# Patient Record
Sex: MALE | Race: WHITE | Employment: OTHER | URBAN - METROPOLITAN AREA
[De-identification: names, ages, dates, MRNs, and addresses within clinical notes are randomized per-mention and may not be internally consistent; named-entity substitution may affect disease eponyms.]

---

## 2019-12-04 ENCOUNTER — HOSPITAL ENCOUNTER (INPATIENT)
Age: 69
LOS: 7 days | Discharge: NURSING, REHAB OR LONG-TERM CARE | DRG: 603 | End: 2019-12-11
Attending: STUDENT IN AN ORGANIZED HEALTH CARE EDUCATION/TRAINING PROGRAM | Admitting: HOSPITALIST
Payer: MEDICARE

## 2019-12-04 ENCOUNTER — APPOINTMENT (OUTPATIENT)
Dept: VASCULAR SURGERY | Age: 69
DRG: 603 | End: 2019-12-04
Attending: HOSPITALIST
Payer: MEDICARE

## 2019-12-04 ENCOUNTER — APPOINTMENT (OUTPATIENT)
Dept: GENERAL RADIOLOGY | Age: 69
DRG: 603 | End: 2019-12-04
Attending: HOSPITALIST
Payer: MEDICARE

## 2019-12-04 DIAGNOSIS — L03.116 CELLULITIS OF LEFT LOWER EXTREMITY: Primary | ICD-10-CM

## 2019-12-04 PROBLEM — L03.119 CELLULITIS OF LOWER EXTREMITY: Status: ACTIVE | Noted: 2019-12-04

## 2019-12-04 LAB
ALBUMIN SERPL-MCNC: 3.3 G/DL (ref 3.5–5)
ALBUMIN/GLOB SERPL: 0.8 {RATIO} (ref 1.1–2.2)
ALP SERPL-CCNC: 106 U/L (ref 45–117)
ALT SERPL-CCNC: 17 U/L (ref 12–78)
ANION GAP SERPL CALC-SCNC: 9 MMOL/L (ref 5–15)
AST SERPL-CCNC: 9 U/L (ref 15–37)
BASOPHILS # BLD: 0.1 K/UL (ref 0–0.1)
BASOPHILS NFR BLD: 0 % (ref 0–1)
BILIRUB SERPL-MCNC: 0.4 MG/DL (ref 0.2–1)
BNP SERPL-MCNC: 250 PG/ML
BUN SERPL-MCNC: 29 MG/DL (ref 6–20)
BUN/CREAT SERPL: 22 (ref 12–20)
CALCIUM SERPL-MCNC: 9.2 MG/DL (ref 8.5–10.1)
CHLORIDE SERPL-SCNC: 95 MMOL/L (ref 97–108)
CO2 SERPL-SCNC: 30 MMOL/L (ref 21–32)
CREAT SERPL-MCNC: 1.34 MG/DL (ref 0.7–1.3)
DIFFERENTIAL METHOD BLD: ABNORMAL
EOSINOPHIL # BLD: 0.3 K/UL (ref 0–0.4)
EOSINOPHIL NFR BLD: 2 % (ref 0–7)
ERYTHROCYTE [DISTWIDTH] IN BLOOD BY AUTOMATED COUNT: 13.2 % (ref 11.5–14.5)
EST. AVERAGE GLUCOSE BLD GHB EST-MCNC: 192 MG/DL
GLOBULIN SER CALC-MCNC: 4.2 G/DL (ref 2–4)
GLUCOSE BLD STRIP.AUTO-MCNC: 138 MG/DL (ref 65–100)
GLUCOSE SERPL-MCNC: 254 MG/DL (ref 65–100)
HBA1C MFR BLD: 8.3 % (ref 4–5.6)
HCT VFR BLD AUTO: 38.3 % (ref 36.6–50.3)
HGB BLD-MCNC: 11.8 G/DL (ref 12.1–17)
IMM GRANULOCYTES # BLD AUTO: 0.1 K/UL (ref 0–0.04)
IMM GRANULOCYTES NFR BLD AUTO: 1 % (ref 0–0.5)
LYMPHOCYTES # BLD: 1.5 K/UL (ref 0.8–3.5)
LYMPHOCYTES NFR BLD: 10 % (ref 12–49)
MAGNESIUM SERPL-MCNC: 1.4 MG/DL (ref 1.6–2.4)
MCH RBC QN AUTO: 30.2 PG (ref 26–34)
MCHC RBC AUTO-ENTMCNC: 30.8 G/DL (ref 30–36.5)
MCV RBC AUTO: 98 FL (ref 80–99)
MONOCYTES # BLD: 0.7 K/UL (ref 0–1)
MONOCYTES NFR BLD: 5 % (ref 5–13)
NEUTS SEG # BLD: 11.6 K/UL (ref 1.8–8)
NEUTS SEG NFR BLD: 82 % (ref 32–75)
NRBC # BLD: 0 K/UL (ref 0–0.01)
NRBC BLD-RTO: 0 PER 100 WBC
PLATELET # BLD AUTO: 332 K/UL (ref 150–400)
PMV BLD AUTO: 8.4 FL (ref 8.9–12.9)
POTASSIUM SERPL-SCNC: 4.4 MMOL/L (ref 3.5–5.1)
PROCALCITONIN SERPL-MCNC: <0.05 NG/ML
PROT SERPL-MCNC: 7.5 G/DL (ref 6.4–8.2)
RBC # BLD AUTO: 3.91 M/UL (ref 4.1–5.7)
SERVICE CMNT-IMP: ABNORMAL
SODIUM SERPL-SCNC: 134 MMOL/L (ref 136–145)
WBC # BLD AUTO: 14.2 K/UL (ref 4.1–11.1)

## 2019-12-04 PROCEDURE — 74011250636 HC RX REV CODE- 250/636: Performed by: HOSPITALIST

## 2019-12-04 PROCEDURE — 74011000258 HC RX REV CODE- 258: Performed by: STUDENT IN AN ORGANIZED HEALTH CARE EDUCATION/TRAINING PROGRAM

## 2019-12-04 PROCEDURE — 85025 COMPLETE CBC W/AUTO DIFF WBC: CPT

## 2019-12-04 PROCEDURE — 80053 COMPREHEN METABOLIC PANEL: CPT

## 2019-12-04 PROCEDURE — 83036 HEMOGLOBIN GLYCOSYLATED A1C: CPT

## 2019-12-04 PROCEDURE — 83880 ASSAY OF NATRIURETIC PEPTIDE: CPT

## 2019-12-04 PROCEDURE — 74011636637 HC RX REV CODE- 636/637: Performed by: HOSPITALIST

## 2019-12-04 PROCEDURE — 82962 GLUCOSE BLOOD TEST: CPT

## 2019-12-04 PROCEDURE — 99285 EMERGENCY DEPT VISIT HI MDM: CPT

## 2019-12-04 PROCEDURE — 74011250636 HC RX REV CODE- 250/636: Performed by: STUDENT IN AN ORGANIZED HEALTH CARE EDUCATION/TRAINING PROGRAM

## 2019-12-04 PROCEDURE — 93970 EXTREMITY STUDY: CPT

## 2019-12-04 PROCEDURE — 84145 PROCALCITONIN (PCT): CPT

## 2019-12-04 PROCEDURE — 65270000029 HC RM PRIVATE

## 2019-12-04 PROCEDURE — 74011000258 HC RX REV CODE- 258: Performed by: HOSPITALIST

## 2019-12-04 PROCEDURE — 83735 ASSAY OF MAGNESIUM: CPT

## 2019-12-04 PROCEDURE — 74011250637 HC RX REV CODE- 250/637: Performed by: HOSPITALIST

## 2019-12-04 PROCEDURE — 36415 COLL VENOUS BLD VENIPUNCTURE: CPT

## 2019-12-04 PROCEDURE — 74011000250 HC RX REV CODE- 250: Performed by: HOSPITALIST

## 2019-12-04 PROCEDURE — 87040 BLOOD CULTURE FOR BACTERIA: CPT

## 2019-12-04 PROCEDURE — 71045 X-RAY EXAM CHEST 1 VIEW: CPT

## 2019-12-04 RX ORDER — LANOLIN ALCOHOL/MO/W.PET/CERES
3 CREAM (GRAM) TOPICAL
Status: DISCONTINUED | OUTPATIENT
Start: 2019-12-04 | End: 2019-12-11 | Stop reason: HOSPADM

## 2019-12-04 RX ORDER — LISINOPRIL 20 MG/1
20 TABLET ORAL DAILY
COMMUNITY

## 2019-12-04 RX ORDER — MAGNESIUM SULFATE HEPTAHYDRATE 40 MG/ML
2 INJECTION, SOLUTION INTRAVENOUS ONCE
Status: COMPLETED | OUTPATIENT
Start: 2019-12-04 | End: 2019-12-04

## 2019-12-04 RX ORDER — INSULIN LISPRO 100 [IU]/ML
INJECTION, SOLUTION INTRAVENOUS; SUBCUTANEOUS
Status: DISCONTINUED | OUTPATIENT
Start: 2019-12-04 | End: 2019-12-11 | Stop reason: HOSPADM

## 2019-12-04 RX ORDER — CLOTRIMAZOLE AND BETAMETHASONE DIPROPIONATE 10; .64 MG/G; MG/G
CREAM TOPICAL 2 TIMES DAILY
Status: DISCONTINUED | OUTPATIENT
Start: 2019-12-04 | End: 2019-12-11 | Stop reason: HOSPADM

## 2019-12-04 RX ORDER — MAGNESIUM SULFATE 100 %
4 CRYSTALS MISCELLANEOUS AS NEEDED
Status: DISCONTINUED | OUTPATIENT
Start: 2019-12-04 | End: 2019-12-11 | Stop reason: HOSPADM

## 2019-12-04 RX ORDER — HEPARIN SODIUM 5000 [USP'U]/ML
5000 INJECTION, SOLUTION INTRAVENOUS; SUBCUTANEOUS EVERY 8 HOURS
Status: DISCONTINUED | OUTPATIENT
Start: 2019-12-04 | End: 2019-12-11 | Stop reason: HOSPADM

## 2019-12-04 RX ORDER — LOPERAMIDE HYDROCHLORIDE 2 MG/1
2 CAPSULE ORAL
Status: DISCONTINUED | OUTPATIENT
Start: 2019-12-04 | End: 2019-12-11 | Stop reason: HOSPADM

## 2019-12-04 RX ORDER — INSULIN GLARGINE 100 [IU]/ML
20 INJECTION, SOLUTION SUBCUTANEOUS
Status: DISCONTINUED | OUTPATIENT
Start: 2019-12-04 | End: 2019-12-11 | Stop reason: HOSPADM

## 2019-12-04 RX ORDER — BUMETANIDE 0.25 MG/ML
1 INJECTION INTRAMUSCULAR; INTRAVENOUS EVERY 12 HOURS
Status: DISCONTINUED | OUTPATIENT
Start: 2019-12-04 | End: 2019-12-07

## 2019-12-04 RX ORDER — DOCUSATE SODIUM 100 MG/1
100 CAPSULE, LIQUID FILLED ORAL DAILY
Status: DISCONTINUED | OUTPATIENT
Start: 2019-12-05 | End: 2019-12-11 | Stop reason: HOSPADM

## 2019-12-04 RX ORDER — SERTRALINE HYDROCHLORIDE 50 MG/1
25 TABLET, FILM COATED ORAL
Status: DISCONTINUED | OUTPATIENT
Start: 2019-12-04 | End: 2019-12-11 | Stop reason: HOSPADM

## 2019-12-04 RX ORDER — VANCOMYCIN 2 GRAM/500 ML IN 0.9 % SODIUM CHLORIDE INTRAVENOUS
2000 ONCE
Status: COMPLETED | OUTPATIENT
Start: 2019-12-04 | End: 2019-12-04

## 2019-12-04 RX ORDER — METFORMIN HYDROCHLORIDE 1000 MG/1
1000 TABLET ORAL 2 TIMES DAILY WITH MEALS
COMMUNITY

## 2019-12-04 RX ORDER — METOPROLOL SUCCINATE 100 MG/1
100 TABLET, EXTENDED RELEASE ORAL DAILY
COMMUNITY

## 2019-12-04 RX ORDER — LANOLIN ALCOHOL/MO/W.PET/CERES
400 CREAM (GRAM) TOPICAL DAILY
Status: DISCONTINUED | OUTPATIENT
Start: 2019-12-05 | End: 2019-12-11 | Stop reason: HOSPADM

## 2019-12-04 RX ORDER — SODIUM CHLORIDE 9 MG/ML
100 INJECTION, SOLUTION INTRAVENOUS CONTINUOUS
Status: CANCELLED | OUTPATIENT
Start: 2019-12-04

## 2019-12-04 RX ORDER — INSULIN ASPART 100 [IU]/ML
INJECTION, SOLUTION INTRAVENOUS; SUBCUTANEOUS
COMMUNITY

## 2019-12-04 RX ORDER — CLOTRIMAZOLE AND BETAMETHASONE DIPROPIONATE 10; .64 MG/G; MG/G
CREAM TOPICAL 2 TIMES DAILY
COMMUNITY
End: 2020-01-06

## 2019-12-04 RX ORDER — SODIUM CHLORIDE 0.9 % (FLUSH) 0.9 %
5-40 SYRINGE (ML) INJECTION EVERY 8 HOURS
Status: DISCONTINUED | OUTPATIENT
Start: 2019-12-04 | End: 2019-12-11 | Stop reason: HOSPADM

## 2019-12-04 RX ORDER — LOPERAMIDE HYDROCHLORIDE 2 MG/1
2 CAPSULE ORAL
COMMUNITY
End: 2020-01-06

## 2019-12-04 RX ORDER — SODIUM CHLORIDE 0.9 % (FLUSH) 0.9 %
5-40 SYRINGE (ML) INJECTION AS NEEDED
Status: DISCONTINUED | OUTPATIENT
Start: 2019-12-04 | End: 2019-12-11 | Stop reason: HOSPADM

## 2019-12-04 RX ORDER — MELATONIN 5 MG
5 CAPSULE ORAL
COMMUNITY

## 2019-12-04 RX ORDER — INSULIN GLARGINE 100 [IU]/ML
10 INJECTION, SOLUTION SUBCUTANEOUS EVERY EVENING
COMMUNITY
End: 2020-01-13

## 2019-12-04 RX ORDER — DOCUSATE SODIUM 100 MG/1
100 CAPSULE, LIQUID FILLED ORAL DAILY
COMMUNITY

## 2019-12-04 RX ORDER — LANOLIN ALCOHOL/MO/W.PET/CERES
400 CREAM (GRAM) TOPICAL DAILY
COMMUNITY

## 2019-12-04 RX ORDER — VANCOMYCIN/0.9 % SOD CHLORIDE 1.5G/250ML
1500 PLASTIC BAG, INJECTION (ML) INTRAVENOUS
Status: DISCONTINUED | OUTPATIENT
Start: 2019-12-05 | End: 2019-12-05

## 2019-12-04 RX ORDER — GLIPIZIDE 5 MG/1
5 TABLET ORAL 2 TIMES DAILY
Status: ON HOLD | COMMUNITY
End: 2019-12-11 | Stop reason: SDUPTHER

## 2019-12-04 RX ORDER — FUROSEMIDE 40 MG/1
40 TABLET ORAL 2 TIMES DAILY
COMMUNITY
End: 2019-12-11

## 2019-12-04 RX ORDER — SERTRALINE HYDROCHLORIDE 25 MG/1
25 TABLET, FILM COATED ORAL
COMMUNITY

## 2019-12-04 RX ADMIN — AMPICILLIN SODIUM AND SULBACTAM SODIUM 3 G: 2; 1 INJECTION, POWDER, FOR SOLUTION INTRAMUSCULAR; INTRAVENOUS at 15:38

## 2019-12-04 RX ADMIN — MAGNESIUM SULFATE HEPTAHYDRATE 2 G: 40 INJECTION, SOLUTION INTRAVENOUS at 19:05

## 2019-12-04 RX ADMIN — VANCOMYCIN HYDROCHLORIDE 2000 MG: 10 INJECTION, POWDER, LYOPHILIZED, FOR SOLUTION INTRAVENOUS at 16:31

## 2019-12-04 RX ADMIN — Medication 10 ML: at 22:56

## 2019-12-04 RX ADMIN — INSULIN GLARGINE 20 UNITS: 100 INJECTION, SOLUTION SUBCUTANEOUS at 22:57

## 2019-12-04 RX ADMIN — MELATONIN 3 MG: at 22:56

## 2019-12-04 RX ADMIN — HEPARIN SODIUM 5000 UNITS: 5000 INJECTION INTRAVENOUS; SUBCUTANEOUS at 19:21

## 2019-12-04 RX ADMIN — BUMETANIDE 1 MG: 0.25 INJECTION INTRAMUSCULAR; INTRAVENOUS at 22:57

## 2019-12-04 RX ADMIN — PIPERACILLIN AND TAZOBACTAM 3.38 G: 3; .375 INJECTION, POWDER, LYOPHILIZED, FOR SOLUTION INTRAVENOUS at 19:05

## 2019-12-04 RX ADMIN — SERTRALINE HYDROCHLORIDE 25 MG: 50 TABLET ORAL at 22:56

## 2019-12-04 NOTE — ED TRIAGE NOTES
Arrives via EMS from [facility] for cellulitis of bilateral lower legs x 2 months that has progressed r/t frequent urinary incontinence down his legs. Home health aide requested transport for evaluation. Hx diabetes, UTI, sepsis, hypokalemia and hypomagnesia.  EXTREMELY [place]

## 2019-12-04 NOTE — H&P
[facility][note 3]  HISTORY AND PHYSICAL    Name:  Malissa Cheng  MR#:  [id number]  :  1950  ACCOUNT #:  [de-identified]  ADMIT DATE:  2019    PRIMARY CARE PROVIDER:  Unknown. SOURCE OF INFORMATION:  The patient. CHIEF COMPLAINT:  Lower extremity swelling and redness of 2 months' duration. HISTORY OF PRESENTING ILLNESS:  This is a 78-year-old  male with past medical history significant for type 2 diabetes mellitus and hypertension who is from [facility] facility who is brought for progressive lower extremity swelling and redness of 2 months' duration. The patient is hard of hearing and poor historian. He uses hearing aid. No medical record from the [facility] facility. The patient stated that he started to have lower extremity swelling, redness and progressively worse for the last 2 months. He has also associated pain. He says he uses wheelchair and a walker to ambulate. No fever, chills, sweating, nausea, vomiting, left-sided chest pain, palpitation, shortness of breath, abdominal pain, urine complaint or abnormal bowel movement. The patient not able to give detailed information about his previous medical problems. In ER, his blood pressure was 137/67, pulse 75, temperature 97.4, saturation of oxygen 94% on room air. He received IV ampicillin, vancomycin, blood culture was sent, and referred to Hospitalist Service for further evaluation and admission. REVIEW OF SYSTEMS:  Pertinent positive findings mentioned in HPI. All systems reviewed. No any other positive finding. PAST MEDICAL HISTORY:  1. Type 2 diabetes mellitus. 2.  Hypertension. 3.  Hard of hearing. ALLERGIES:  NO KNOWN DRUG ALLERGIES. SOCIAL HISTORY:  The patient lives at [facility] facility. No tobacco or alcohol abuse. Ambulates with wheelchair and a walker. Code status, full code.     FAMILY HISTORY:  Unable to obtain from the patient. PHYSICAL EXAMINATION:  VITAL SIGNS:  Blood pressure 137/67, pulse 75, temperature 97.4, respiratory rate 16, saturation of oxygen 94% on room air. GENERAL APPEARANCE:  The patient is alert, cooperative, not in distress. He appears his stated age. The patient is hard of hearing. He uses hearing aid. HEENT:  Pink conjunctivae. Anicteric sclerae. Moist tongue and buccal mucosa. LUNGS:  Decreased breath sounds to auscultation bilaterally. CHEST WALL:  No tenderness or deformity. CARDIOVASCULAR SYSTEM:  Regular rate and rhythm. S1 and S2 heard. No murmur or gallop. ABDOMEN:  Obese, soft, nontender. Bowel sounds normal.  No mass or organomegaly. EXTREMITIES:  Bilateral pretibial and pedal edema. SKIN:  He has diffuse redness involving both lower extremities, warm to touch and tender, and has area oozing extracellular fluids. CENTRAL NERVOUS SYSTEM:  Conscious and alert. Oriented to place and person. Motor 5/5. Cranial nerves II through XII grossly intact. LABORATORY DATA:  White blood cell count 14.2, hemoglobin 11.8, hematocrit 38.3, platelet count 321. Chemistry; sodium 134, potassium 4.4, chloride 95, CO2 of 30, anion gap 9, glucose 254, BUN 29, creatinine 1.34, BUN-creatinine ratio 22, calcium 9.2, magnesium 1.4. Total bilirubin 0.4, total protein 7.5, albumin 3.3, ALT 17, AST 9, alkaline phosphatase 106. Procalcitonin less than 0.05. ProBNP 250. ASSESSMENT:  1. Cellulitis of bilateral lower extremities, possible chronic venous stasis. 2.  Type 2 diabetes mellitus with hyperglycemia. 3.  Mild hyponatremia. 4.  Hypomagnesemia. 5.  Renal insufficiency. 6.  Chronic bilateral lower extremity edema. 7.  Hard of hearing. 8.  Morbid obesity. PLAN:  1. Cellulitis of the bilateral lower extremities with possible associated venous stasis. Admit the patient to medical floor. will continue IV Zosyn, vancomycin.    will follow up blood culture and will involve wound care nurse.  The patient is advised to elevate the leg.  will do a Doppler study of the lower extremities, echocardiography. 2.  Type 2 diabetes mellitus with hyperglycemia.  will check his hemoglobin A1c. No record of his home medication. will give him Lantus 20 units subcutaneous q. 12 sliding scale, monitor fingerstick glucose, and diabetes diet. 3.  Mild hyponatremia, multifactorial.  will repeat the BMP in a.m.  4.  Hypomagnesemia, replace with IV 2 g magnesium and repeat magnesium level in a.m.  5.  Renal insufficiency, multifactorial. will do a renal function test and if it does not improve, we may do ultrasound of the kidney. 6.  Bilateral lower extremity edema, unclear etiology. will do Doppler study of lower extremities, echocardiography, BNP, and we will give Bumex 1 mg IV q. 12 and monitor electrolytes. 7.  Impaired hearing. The patient uses hearing aid. Continue supportive care. 8.  Morbid obesity. Diet and weight loss program.    DVT prophylaxis, heparin. FUNCTIONAL STATUS PRIOR TO ADMISSION:  The patient uses wheelchair and walker.         Amadeo Ching MD      [id number]  D:  12/04/2019 14:49  T:  12/04/2019 17:46  JOB #:  [id number]

## 2019-12-04 NOTE — PROGRESS NOTES
Pharmacist Note - Vancomycin Dosing    Consult provided for this 71 y.o. male for indication of LE cellulitis. Antibiotic regimen(s): Vanc + Zosyn  Patient on vancomycin PTA? NO     Recent Labs     19  1127   WBC 14.2*   CREA 1.34*   BUN 29*     Frequency of BMP: daily  Height: 180 cm  Weight: 108 kg  Est CrCl: 65 ml/min  Temp (24hrs), Av.4 °F (36.3 °C), Min:97.4 °F (36.3 °C), Max:97.4 °F (36.3 °C)    Goal trough = 10 - 15 mcg/mL    Therapy will be initiated with a loading dose of 2000 mg IV x 1 to be followed by a maintenance dose of 1500 mg IV every 18 hours. Pharmacy to follow patient daily and order levels / make dose adjustments as appropriate.

## 2019-12-04 NOTE — PROGRESS NOTES
Admission Medication Reconciliation:    Information obtained from:  [facility]  RxQuery data available¹:  NO    Comments/Recommendations: Updated PTA meds/reviewed patient's allergies. 1)  Information obtained from Medication Review Report from [facility]. Attempted to reach RN at the facility but no one is picking up the phone at the facility at this time. 2)  There were no medications listed on the PTA list prior to this review. All medications from Facility list have been added to the list.     [facility] benefit data reflects medications filled and processed through the patient's insurance, however   this data does NOT capture whether the medication was picked up or is currently being taken by the patient. Allergies:  Patient has no known allergies. Significant PMH/Disease States: No past medical history on file. Chief Complaint for this Admission:    Chief Complaint   Patient presents with    Skin Problem     Prior to Admission Medications:   Prior to Admission Medications   Prescriptions Last Dose Informant Patient Reported? Taking? clotrimazole-betamethasone (LOTRISONE) topical cream   Yes Yes   Sig: Apply  to affected area two (2) times a day. Apply to groin/scrotum   docusate sodium (COLACE) 100 mg capsule   Yes Yes   Sig: Take 100 mg by mouth daily. furosemide (LASIX) 40 mg tablet   Yes Yes   Sig: Take 40 mg by mouth two (2) times a day. glipiZIDE (GLUCOTROL) 5 mg tablet   Yes Yes   Sig: Take 5 mg by mouth two (2) times a day. insulin aspart U-100 (NOVOLOG U-100 INSULIN ASPART) 100 unit/mL injection   Yes Yes   Sig: by SubCUTAneous route. Sliding scale:  -250 = 4 units  -300 = 6 units  -350 = 8 units  BS > 351 = 10 units   insulin glargine (LANTUS,BASAGLAR) 100 unit/mL (3 mL) inpn   Yes Yes   Sig: 10 Units by SubCUTAneous route every evening. lisinopril (PRINIVIL, ZESTRIL) 20 mg tablet   Yes Yes   Sig: Take 20 mg by mouth daily.    loperamide (IMODIUM) 2 mg capsule   Yes Yes   Sig: Take 2 mg by mouth every six (6) hours as needed for Diarrhea.   magnesium oxide (MAG-OX) 400 mg tablet   Yes Yes   Sig: Take 400 mg by mouth daily. melatonin 5 mg cap capsule   Yes Yes   Sig: Take 5 mg by mouth nightly. metFORMIN (GLUCOPHAGE) 1,000 mg tablet   Yes Yes   Sig: Take 1,000 mg by mouth two (2) times daily (with meals). metoprolol succinate (TOPROL-XL) 100 mg tablet   Yes Yes   Sig: Take 100 mg by mouth daily. sertraline (ZOLOFT) 25 mg tablet   Yes Yes   Sig: Take 25 mg by mouth nightly. Facility-Administered Medications: None       Please contact the main inpatient pharmacy with any questions or concerns at [phone] and we will direct you to the clinical pharmacist covering this patient's care while in-house.    Nayely Beck, NAYLAD

## 2019-12-04 NOTE — PROGRESS NOTES
Admission Medication Reconciliation: 
 
Information obtained from:  [facility] RxQuery data available¹:  NO 
 
Comments/Recommendations: Updated PTA meds/reviewed patient's allergies. 1)  Information obtained from Medication Review Report from [facility]. Attempted to reach RN at the facility but no one is picking up the phone at the facility at this time. 2)  There were no medications listed on the PTA list prior to this review. All medications from Facility list have been added to the list. 
  
[facility] benefit data reflects medications filled and processed through the patient's insurance, however  
this data does NOT capture whether the medication was picked up or is currently being taken by the patient. Allergies:  Patient has no known allergies. Significant PMH/Disease States: No past medical history on file. Chief Complaint for this Admission: Chief Complaint Patient presents with Skin Problem Prior to Admission Medications:  
Prior to Admission Medications Prescriptions Last Dose Informant Patient Reported? Taking? clotrimazole-betamethasone (LOTRISONE) topical cream   Yes Yes Sig: Apply  to affected area two (2) times a day. Apply to groin/scrotum  
docusate sodium (COLACE) 100 mg capsule   Yes Yes Sig: Take 100 mg by mouth daily. furosemide (LASIX) 40 mg tablet   Yes Yes Sig: Take 40 mg by mouth two (2) times a day. glipiZIDE (GLUCOTROL) 5 mg tablet   Yes Yes Sig: Take 5 mg by mouth two (2) times a day. insulin aspart U-100 (NOVOLOG U-100 INSULIN ASPART) 100 unit/mL injection   Yes Yes Sig: by SubCUTAneous route. Sliding scale: 
-250 = 4 units -300 = 6 units -350 = 8 units BS > 351 = 10 units  
insulin glargine (LANTUS,BASAGLAR) 100 unit/mL (3 mL) inpn   Yes Yes Sig: 10 Units by SubCUTAneous route every evening. lisinopril (PRINIVIL, ZESTRIL) 20 mg tablet   Yes Yes Sig: Take 20 mg by mouth daily. loperamide (IMODIUM) 2 mg capsule   Yes Yes Sig: Take 2 mg by mouth every six (6) hours as needed for Diarrhea.  
magnesium oxide (MAG-OX) 400 mg tablet   Yes Yes Sig: Take 400 mg by mouth daily. melatonin 5 mg cap capsule   Yes Yes Sig: Take 5 mg by mouth nightly. metFORMIN (GLUCOPHAGE) 1,000 mg tablet   Yes Yes Sig: Take 1,000 mg by mouth two (2) times daily (with meals). metoprolol succinate (TOPROL-XL) 100 mg tablet   Yes Yes Sig: Take 100 mg by mouth daily. sertraline (ZOLOFT) 25 mg tablet   Yes Yes Sig: Take 25 mg by mouth nightly. Facility-Administered Medications: None Please contact the main inpatient pharmacy with any questions or concerns at [phone] and we will direct you to the clinical pharmacist covering this patient's care while in-house.   
Ilana Lehman, PHARMD

## 2019-12-04 NOTE — ED PROVIDER NOTES
71 y.o. male with past medical history significant for DM, hypokalemia, UTI, sepsis, and hypomagnesia who presents via EMS from [address] [place] [address] with chief complaint of skin problem. Pt has possible cellulitis to bilateral lower legs x 2 months that has worsened secondary to frequent urinary incontinence that runs down his legs. Per EMS he has been wearing depends and has limited mobility, limiting his ability to get himself to the bathroom. Staff at his home checked on him this AM and were concerned so they called EMS to bring him to ED. Per staff he is not on any abx. Pt very hard of hearing. There are no other acute medical concerns at this time. Social hx: not available    PCP: No primary care provider on file. Note written by Octavia Pulido, as dictated by Desiree Edmonds MD 11:22 AM      The history is provided by the patient and the EMS personnel. No  was used. No past medical history on file. No past surgical history on file. No family history on file.     Social History     Socioeconomic History    Marital status: Not on file     Spouse name: Not on file    Number of children: Not on file    Years of education: Not on file    Highest education level: Not on file   Occupational History    Not on file   Social Needs    Financial resource strain: Not on file    Food insecurity:     Worry: Not on file     Inability: Not on file    Transportation needs:     Medical: Not on file     Non-medical: Not on file   Tobacco Use    Smoking status: Not on file   Substance and Sexual Activity    Alcohol use: Not on file    Drug use: Not on file    Sexual activity: Not on file   Lifestyle    Physical activity:     Days per week: Not on file     Minutes per session: Not on file    Stress: Not on file   Relationships    Social connections:     Talks on phone: Not on file     Gets together: Not on file     Attends Tenriism service: Not on file Active member of club or organization: Not on file     Attends meetings of clubs or organizations: Not on file     Relationship status: Not on file    Intimate partner violence:     Fear of current or ex partner: Not on file     Emotionally abused: Not on file     Physically abused: Not on file     Forced sexual activity: Not on file   Other Topics Concern    Not on file   Social History Narrative    Not on file         ALLERGIES: Patient has no allergy information on record. Review of Systems   Constitutional: Negative for fever. Genitourinary:        + urinary incontinence    Skin: Positive for rash (cellulitis, bilateral lower legs). All other systems reviewed and are negative. Vitals:    12/04/19 1121   BP: 137/67   Pulse: 75   Resp: 16   Temp: 97.4 °F (36.3 °C)   SpO2: 94%            Physical Exam  Vitals signs and nursing note reviewed. Constitutional:       Appearance: He is well-developed. He is not diaphoretic. HENT:      Head: Normocephalic and atraumatic. Ears:      Comments: Pt extremely hard of hearing  Neck:      Musculoskeletal: No neck rigidity. Pulmonary:      Effort: Pulmonary effort is normal.      Breath sounds: Normal breath sounds. Abdominal:      General: There is no distension. Palpations: Abdomen is soft. Tenderness: There is no tenderness. Skin:     General: Skin is warm. Comments: Red cellulitis, with warmth to majority of L lower leg and R ankle and anterior shin   Neurological:      Mental Status: He is alert. Cranial Nerves: No cranial nerve deficit. Note written by Octavia Ya, as dictated by Raghav Abel MD 1:28 PM      MDM  Number of Diagnoses or Management Options  Cellulitis of left lower extremity:   Diagnosis management comments: Patient's wraps were removed, there is significant induration and erythema of the left lower extremity especially greater than the right. Notable leukocytosis.   Afebrile however. Plan to admit for IV antibiotics given comorbidities, patient's ability to care for self at home. Will also benefit with wound care assessment. Procedures          Hospitalist Perfect Serve for Admission  1:58 PM    ED Room Number: ER22/22  Patient Name and age:  Emilyl Demetrius 71 y.o.  male  Working Diagnosis:   1.  Cellulitis of left lower extremity      Readmission: no  Isolation Requirements:  no  Recommended Level of Care:  med/surg  Code Status:  Full Code  Department:[facility] Adult ED - 21   Other:

## 2019-12-05 LAB
ALBUMIN SERPL-MCNC: 2.7 G/DL (ref 3.5–5)
ALBUMIN/GLOB SERPL: 0.7 {RATIO} (ref 1.1–2.2)
ALP SERPL-CCNC: 90 U/L (ref 45–117)
ALT SERPL-CCNC: 13 U/L (ref 12–78)
ANION GAP SERPL CALC-SCNC: 6 MMOL/L (ref 5–15)
AST SERPL-CCNC: 11 U/L (ref 15–37)
BILIRUB SERPL-MCNC: 0.4 MG/DL (ref 0.2–1)
BUN SERPL-MCNC: 21 MG/DL (ref 6–20)
BUN/CREAT SERPL: 20 (ref 12–20)
CALCIUM SERPL-MCNC: 8.4 MG/DL (ref 8.5–10.1)
CHLORIDE SERPL-SCNC: 99 MMOL/L (ref 97–108)
CO2 SERPL-SCNC: 31 MMOL/L (ref 21–32)
CREAT SERPL-MCNC: 1.06 MG/DL (ref 0.7–1.3)
ERYTHROCYTE [DISTWIDTH] IN BLOOD BY AUTOMATED COUNT: 13 % (ref 11.5–14.5)
GLOBULIN SER CALC-MCNC: 3.8 G/DL (ref 2–4)
GLUCOSE BLD STRIP.AUTO-MCNC: 183 MG/DL (ref 65–100)
GLUCOSE BLD STRIP.AUTO-MCNC: 256 MG/DL (ref 65–100)
GLUCOSE BLD STRIP.AUTO-MCNC: 261 MG/DL (ref 65–100)
GLUCOSE BLD STRIP.AUTO-MCNC: 84 MG/DL (ref 65–100)
GLUCOSE SERPL-MCNC: 102 MG/DL (ref 65–100)
HCT VFR BLD AUTO: 32.9 % (ref 36.6–50.3)
HGB BLD-MCNC: 10.5 G/DL (ref 12.1–17)
MAGNESIUM SERPL-MCNC: 1.6 MG/DL (ref 1.6–2.4)
MCH RBC QN AUTO: 30.3 PG (ref 26–34)
MCHC RBC AUTO-ENTMCNC: 31.9 G/DL (ref 30–36.5)
MCV RBC AUTO: 95.1 FL (ref 80–99)
NRBC # BLD: 0 K/UL (ref 0–0.01)
NRBC BLD-RTO: 0 PER 100 WBC
PLATELET # BLD AUTO: 299 K/UL (ref 150–400)
PMV BLD AUTO: 8.8 FL (ref 8.9–12.9)
POTASSIUM SERPL-SCNC: 3.9 MMOL/L (ref 3.5–5.1)
PROT SERPL-MCNC: 6.5 G/DL (ref 6.4–8.2)
RBC # BLD AUTO: 3.46 M/UL (ref 4.1–5.7)
SERVICE CMNT-IMP: ABNORMAL
SERVICE CMNT-IMP: NORMAL
SODIUM SERPL-SCNC: 136 MMOL/L (ref 136–145)
WBC # BLD AUTO: 13.1 K/UL (ref 4.1–11.1)

## 2019-12-05 PROCEDURE — 74011000250 HC RX REV CODE- 250: Performed by: HOSPITALIST

## 2019-12-05 PROCEDURE — 65270000029 HC RM PRIVATE

## 2019-12-05 PROCEDURE — 74011636637 HC RX REV CODE- 636/637: Performed by: HOSPITALIST

## 2019-12-05 PROCEDURE — 74011250636 HC RX REV CODE- 250/636: Performed by: HOSPITALIST

## 2019-12-05 PROCEDURE — 83735 ASSAY OF MAGNESIUM: CPT

## 2019-12-05 PROCEDURE — 36415 COLL VENOUS BLD VENIPUNCTURE: CPT

## 2019-12-05 PROCEDURE — 74011250636 HC RX REV CODE- 250/636: Performed by: INTERNAL MEDICINE

## 2019-12-05 PROCEDURE — 74011250637 HC RX REV CODE- 250/637: Performed by: HOSPITALIST

## 2019-12-05 PROCEDURE — 74011000258 HC RX REV CODE- 258: Performed by: HOSPITALIST

## 2019-12-05 PROCEDURE — 85027 COMPLETE CBC AUTOMATED: CPT

## 2019-12-05 PROCEDURE — 80053 COMPREHEN METABOLIC PANEL: CPT

## 2019-12-05 PROCEDURE — 82962 GLUCOSE BLOOD TEST: CPT

## 2019-12-05 RX ORDER — CEFAZOLIN SODIUM/WATER 2 G/20 ML
2 SYRINGE (ML) INTRAVENOUS EVERY 8 HOURS
Status: DISCONTINUED | OUTPATIENT
Start: 2019-12-05 | End: 2019-12-09

## 2019-12-05 RX ADMIN — Medication 10 ML: at 14:00

## 2019-12-05 RX ADMIN — INSULIN LISPRO 3 UNITS: 100 INJECTION, SOLUTION INTRAVENOUS; SUBCUTANEOUS at 22:18

## 2019-12-05 RX ADMIN — CLOTRIMAZOLE AND BETAMETHASONE DIPROPIONATE: 10; .5 CREAM TOPICAL at 10:08

## 2019-12-05 RX ADMIN — CLOTRIMAZOLE AND BETAMETHASONE DIPROPIONATE: 10; .5 CREAM TOPICAL at 19:04

## 2019-12-05 RX ADMIN — Medication 2 G: at 19:00

## 2019-12-05 RX ADMIN — PIPERACILLIN AND TAZOBACTAM 3.38 G: 3; .375 INJECTION, POWDER, LYOPHILIZED, FOR SOLUTION INTRAVENOUS at 03:19

## 2019-12-05 RX ADMIN — INSULIN GLARGINE 20 UNITS: 100 INJECTION, SOLUTION SUBCUTANEOUS at 22:17

## 2019-12-05 RX ADMIN — SERTRALINE HYDROCHLORIDE 25 MG: 50 TABLET ORAL at 22:17

## 2019-12-05 RX ADMIN — VANCOMYCIN HYDROCHLORIDE 1500 MG: 10 INJECTION, POWDER, LYOPHILIZED, FOR SOLUTION INTRAVENOUS at 12:12

## 2019-12-05 RX ADMIN — PIPERACILLIN AND TAZOBACTAM 3.38 G: 3; .375 INJECTION, POWDER, LYOPHILIZED, FOR SOLUTION INTRAVENOUS at 12:12

## 2019-12-05 RX ADMIN — INSULIN LISPRO 2 UNITS: 100 INJECTION, SOLUTION INTRAVENOUS; SUBCUTANEOUS at 12:45

## 2019-12-05 RX ADMIN — HEPARIN SODIUM 5000 UNITS: 5000 INJECTION INTRAVENOUS; SUBCUTANEOUS at 03:19

## 2019-12-05 RX ADMIN — DOCUSATE SODIUM 100 MG: 100 CAPSULE, LIQUID FILLED ORAL at 10:05

## 2019-12-05 RX ADMIN — HEPARIN SODIUM 5000 UNITS: 5000 INJECTION INTRAVENOUS; SUBCUTANEOUS at 12:06

## 2019-12-05 RX ADMIN — HEPARIN SODIUM 5000 UNITS: 5000 INJECTION INTRAVENOUS; SUBCUTANEOUS at 19:23

## 2019-12-05 RX ADMIN — BUMETANIDE 1 MG: 0.25 INJECTION INTRAMUSCULAR; INTRAVENOUS at 11:49

## 2019-12-05 RX ADMIN — INSULIN LISPRO 5 UNITS: 100 INJECTION, SOLUTION INTRAVENOUS; SUBCUTANEOUS at 18:46

## 2019-12-05 RX ADMIN — MAGNESIUM OXIDE TAB 400 MG (241.3 MG ELEMENTAL MG) 400 MG: 400 (241.3 MG) TAB at 10:05

## 2019-12-05 NOTE — PHYSICIAN ADVISORY
Letter of Status Determination: Current Status INPATIENT is Appropriate Pt Name:  Ted Romo MR#  [id number] [facility]#   [id number] Room and Hospital  554/01  @ [facility]  
Hospitalization date  12/4/2019 11:14 AM  
Current Attending Physician  Leno Mueller DO  
Principal diagnosis  Cellulitis of lower extremity Hyponatremia Hypomagnesemia Clinicals  70 y/o gentleman with a past medical history significant for type 2 diabetes mellitus, primary hypertension and resident at [facility] facility was brought in  for progressive lower extremity swelling and redness of two months' duration. Patient also complained of associated pain. Patient mobilizes with wheelchair and a walker at baseline. In ER, patient's blood pressure was 137/67, pulse 75, temperature 97.4, saturation of oxygen 94% on room air. Patient underwent blood culture testing and received IV ampicillin, vancomycin for possible LE cellulitis. . Patient was also noted with a leukocytosis, left shift, mild Hyponatremia and Hypomagnesemia all present on admission. Patient is also currently being treated with IV diuresis and LE elevation. [place] MCG criteria Does  NOT  apply STATUS DETERMINATION  On the basis of clinical data, available documentaion, we believe that the current status of this patient as INPATIENT is Appropriate. This patient is at high risk of adverse events and deterioration based on documented clinical data, comorbid conditions, and current acute care course. The final decision of the patient's hospitalization status depends on the Attending Physcian's judgement. Additional comments Insurance  Payor: [place] MED[place]ARE / Plan: [place] MEDICARE PART A & B / Product Type: Medicare / Insurance Information [facility] PART A & B Phone:   
 Subscriber: Noe Wells Subscriber#: [id number]  Group#:  Precert#:   
  
 
 
 The information in this document is a recommendation to be used for utilization review and utilization management purposes only. This recommendation is not an order. The recommendation is made based on the information reviewed at the time of the referral, is pursuant to the [facility] Conditions of Participation (42 CFR Part 482), and is neither a judgment nor an assessment with regard to the appropriateness or quality of clinical care. For all Managed Care patients: The Criteria are intended solely for use as screening guidelines with respect to the medical appropriateness of healthcare services and not for final clinical or payment determinations concerning the type or level of medical care provided, or proposed to be provided, to a patient. They help the reviewers determine whether a patient is appropriate for observation or inpatient admission at the time a decision to admit the patient is being made. All efforts are made to apply the pertinent payor criteria (MCG or Inter[employer]) as well as the clinical judgements based on the information reviewed at the time of the referral.\" Nothing in this document may be used to limit, alter, or affect clinical services provided to the patient named. Dameon Coleman MD [facility] Physician Advisor [facility] [phone] [email or web address] 
 
1:06 PM 12/5/2019

## 2019-12-05 NOTE — PROGRESS NOTES
[facility] Adult  Hospitalist Group                                                                                          Hospitalist Progress Note  [facility],   Answering service: [phone] from in house phone        Date of Service:  2019  NAME:  Christine Castro  :  1950  MRN:  [id number]      Admission Summary:   70-year-old male with past medical history of venous stasis presented to [facility] with lower extremity edema. Interval history / Subjective:   Patient seen and examined. Requesting to go home. Transition antibiotics. Attempted to reach assisted living facility. Assessment & Plan:     Cellulitis of the left lower extremity:  Bilateral venous stasis:  -Transition antibiotics to Ancef monotherapy  -appreciate wound care  -lower extremity dopplers negative  -echo pending  -f/u cultures  -continue bumex     DM, Type II with hyperglycemia:   -lantus 20 units, SSI    Hyponatremia: resolved  Renal insufficiency: resolved  Impaired hearing: supportive care  Morbid obesity       Code status: full   DVT prophylaxis: heparin     Care Plan discussed with: Patient/Family  Disposition: TBD     Home health service At home care: [facility] Problems  Date Reviewed: 2019          Codes Class Noted POA    * (Principal) Cellulitis of lower extremity ICD-10-CM: L03.119  ICD-9-CM: 682.6  2019 Unknown                Review of Systems:   Negative unless stated above       Vital Signs:    Last 24hrs VS reviewed since prior progress note.  Most recent are:  Visit Vitals  /83 (BP 1 Location: Right arm)   Pulse 74   Temp 98.3 °F (36.8 °C)   Resp 16   Ht 5' 11\" (1.803 m)   Wt 108.7 kg (239 lb 11.2 oz)   SpO2 94%   BMI 33.43 kg/m²       No intake or output data in the 24 hours ending 19 7708     Physical Examination:             Constitutional:  No acute distress, cooperative, pleasant, obese   ENT:  Oral mucous moist, oropharynx benign. Resp:  CTA bilaterally. No wheezing/rhonchi/rales. No accessory muscle use   CV:  Regular rhythm, normal rate, no murmurs, gallops, rubs    GI:  Soft, non distended, non tender    Musculoskeletal:  Erythema bilaterally, left greater than right, weeping, left leg erythema extends to lateral foot    Neurologic:  Moves all extremities          Data Review:    Review and/or order of clinical lab test      Labs:     Recent Labs     12/05/19 0317 12/04/19  1127   WBC 13.1* 14.2*   HGB 10.5* 11.8*   HCT 32.9* 38.3    332     Recent Labs     12/05/19 0317 12/04/19  1127    134*   K 3.9 4.4   CL 99 95*   CO2 31 30   BUN 21* 29*   CREA 1.06 1.34*   * 254*   CA 8.4* 9.2   MG 1.6 1.4*     Recent Labs     12/05/19 0317 12/04/19  1127   SGOT 11* 9*   ALT 13 17   AP 90 106   TBILI 0.4 0.4   TP 6.5 7.5   ALB 2.7* 3.3*   GLOB 3.8 4.2*     No results for input(s): INR, PTP, APTT, INREXT in the last 72 hours. No results for input(s): FE, TIBC, PSAT, FERR in the last 72 hours. No results found for: FOL, RBCF   No results for input(s): PH, PCO2, PO2 in the last 72 hours. No results for input(s): CPK, CKNDX, TROIQ in the last 72 hours.     No lab exists for component: CPKMB  No results found for: CHOL, CHOLX, CHLST, CHOLV, HDL, HDLP, LDL, LDLC, DLDLP, TGLX, TRIGL, TRIGP, CHHD, CHHDX  Lab Results   Component Value Date/Time    Glucose (POC) 183 (H) 12/05/2019 11:37 AM    Glucose (POC) 84 12/05/2019 06:14 AM    Glucose (POC) 138 (H) 12/04/2019 11:03 PM     No results found for: COLOR, APPRN, SPGRU, REFSG, HARRIET, PROTU, GLUCU, KETU, BILU, UROU, LEVI, LEUKU, GLUKE, EPSU, BACTU, WBCU, RBCU, CASTS, UCRY      Medications Reviewed:     Current Facility-Administered Medications   Medication Dose Route Frequency    sodium chloride (NS) flush 5-40 mL  5-40 mL IntraVENous Q8H    sodium chloride (NS) flush 5-40 mL  5-40 mL IntraVENous PRN    heparin (porcine) injection 5,000 Units  5,000 Units SubCUTAneous Q8H  piperacillin-tazobactam (ZOSYN) 3.375 g in 0.9% sodium chloride (MBP/ADV) 100 mL  3.375 g IntraVENous Q8H    insulin lispro (HUMALOG) injection   SubCUTAneous AC&HS    glucose chewable tablet 16 g  4 Tab Oral PRN    glucagon (GLUCAGEN) injection 1 mg  1 mg IntraMUSCular PRN    bumetanide (BUMEX) injection 1 mg  1 mg IntraVENous Q12H    insulin glargine (LANTUS) injection 20 Units  20 Units SubCUTAneous QHS    magnesium oxide (MAG-OX) tablet 400 mg  400 mg Oral DAILY    melatonin tablet 3 mg  3 mg Oral QHS PRN    sertraline (ZOLOFT) tablet 25 mg  25 mg Oral QHS    loperamide (IMODIUM) capsule 2 mg  2 mg Oral Q6H PRN    docusate sodium (COLACE) capsule 100 mg  100 mg Oral DAILY    clotrimazole-betamethasone (LOTRISONE) 1-0.05 % cream   Topical BID    Vancomycin- pharmacy to dose   Other Rx Dosing/Monitoring    vancomycin (VANCOCIN) 1500 mg in  ml infusion  1,500 mg IntraVENous Q18H     ______________________________________________________________________  EXPECTED LENGTH OF STAY: - - -  ACTUAL LENGTH OF STAY:          [facility],

## 2019-12-05 NOTE — PROGRESS NOTES
TRANSFER - IN REPORT:    Verbal report received from Tucker Fox RN(name) on Ricci Ambrosio  being received from ED(unit) for routine progression of care      Report consisted of patients Situation, Background, Assessment and   Recommendations(SBAR). Information from the following report(s) SBAR, Kardex, ED Summary, Intake/Output, MAR and Recent Results was reviewed with the receiving nurse. Opportunity for questions and clarification was provided. Assessment completed upon patients arrival to unit and care assumed.

## 2019-12-05 NOTE — PROGRESS NOTES
Primary Nurse Raelyn Kussmaul and Alva Tom RN performed a dual skin assessment on this patient Impairment noted- see wound doc flow sheet  Medardo score is 19    Patients skin is flaky. Redness and excoriation to bilateral lower extremities.

## 2019-12-05 NOTE — PROGRESS NOTES
Bedside and Verbal shift change report given to April Whitmore RN (oncoming nurse) by Angelo Vilchis RN (offgoing nurse). Report included the following information SBAR, Kardex, Intake/Output, MAR and Recent Results.

## 2019-12-05 NOTE — ED NOTES
Patient's brief changed prior to transport to the floor. Transported to the floor in no acute distress.

## 2019-12-05 NOTE — WOUND CARE
Wound Care Note:  
 
New consult placed by nurse request for lower extremities edema Chart shows: 
Admitted for cellulitis of lower extremity with a history of diabetes and HTN. WBC = 13.1 on 12/5/19 Admitted from [facility] Assessment:  
Patient is alert and talking, VERY hard of hearing, incontinent with moderate assistance needed in repositioning. Bed: [facility] Patient wearing briefs for incontinence Diet: Diabetic consistent carb regular Patient did not respond when asked about pain. Bilateral heels skin intact with blanchable erythema/cellulitis. Bilateral buttocks and sacral skin intact and without erythema. Palpable DP pulses bilaterally. Edema and blanching erythema to lower legs and feet. 1. POA left lower leg, dorsal foot and toes with cellulites and edema, anterior lower leg and lateral lower leg with scattered partial thickness wounds, small amount of serous drainage, rachel-wound with edema and erythema, wound edges are open. Xeroform gauze, 4 x 4 and roll gauze applied. 2.  POA rachel-anal skin, bilateral groin, scrotum and pannus skin is denuded (moisture associated skin damage), left groin with fissure approximately 6 cm x 0.5 cm x 0.1 cm.  Z guard paste to be applied. 3.  POA right lower leg with cellulitis and dry, flaky skin, no open area. Nourishing Skin Cream applied. Spoke with Dr. Aman Sin, wound care orders obtained. Patient repositioned on left side. Recommendations:   
Left lower leg, foot and toes- Daily and as needed for breakthrough drainage, cleanse with normal saline, wipe wound bed clean and dry, apply Xeroform gauze that has been folded in half, cover with 4 x 4's and secure with roll gauze and tape. Rachel-anal skin, bilateral groin, scrotum and pannus- Every 12 hours gently cleanse with soap and water, blot dry, apply Z guard paste. Right lower leg- Every 12 hours moisturize with Nourishing Skin Cream. 
 
Skin Care & Pressure Prevention: 
Minimize layers of linen/pads under patient to optimize support surface. Turn/reposition approximately every 2 hours and offload heels. Manage incontinence / promote continence Nourishing Skin Cream to dry skin, minimize use of briefs when able Discussed above plan with patient Asad Rondon RN Transition of Care: Plan to follow as needed while admitted to hospital. 
 
CADEN Adams, RN, [facility], [facility]. 
office [phone] 
pager [phone] or call  to page

## 2019-12-06 ENCOUNTER — APPOINTMENT (OUTPATIENT)
Dept: NON INVASIVE DIAGNOSTICS | Age: 69
DRG: 603 | End: 2019-12-06
Attending: HOSPITALIST
Payer: MEDICARE

## 2019-12-06 LAB
ANION GAP SERPL CALC-SCNC: 4 MMOL/L (ref 5–15)
BACTERIA SPEC CULT: NORMAL
BACTERIA SPEC CULT: NORMAL
BUN SERPL-MCNC: 17 MG/DL (ref 6–20)
BUN/CREAT SERPL: 15 (ref 12–20)
CALCIUM SERPL-MCNC: 8.3 MG/DL (ref 8.5–10.1)
CHLORIDE SERPL-SCNC: 100 MMOL/L (ref 97–108)
CO2 SERPL-SCNC: 31 MMOL/L (ref 21–32)
CREAT SERPL-MCNC: 1.14 MG/DL (ref 0.7–1.3)
ERYTHROCYTE [DISTWIDTH] IN BLOOD BY AUTOMATED COUNT: 13.1 % (ref 11.5–14.5)
GLUCOSE BLD STRIP.AUTO-MCNC: 185 MG/DL (ref 65–100)
GLUCOSE BLD STRIP.AUTO-MCNC: 287 MG/DL (ref 65–100)
GLUCOSE BLD STRIP.AUTO-MCNC: 290 MG/DL (ref 65–100)
GLUCOSE BLD STRIP.AUTO-MCNC: 90 MG/DL (ref 65–100)
GLUCOSE SERPL-MCNC: 114 MG/DL (ref 65–100)
HCT VFR BLD AUTO: 29.8 % (ref 36.6–50.3)
HGB BLD-MCNC: 9.5 G/DL (ref 12.1–17)
MCH RBC QN AUTO: 30.4 PG (ref 26–34)
MCHC RBC AUTO-ENTMCNC: 31.9 G/DL (ref 30–36.5)
MCV RBC AUTO: 95.5 FL (ref 80–99)
NRBC # BLD: 0 K/UL (ref 0–0.01)
NRBC BLD-RTO: 0 PER 100 WBC
PLATELET # BLD AUTO: 289 K/UL (ref 150–400)
PMV BLD AUTO: 9 FL (ref 8.9–12.9)
POTASSIUM SERPL-SCNC: 3.8 MMOL/L (ref 3.5–5.1)
RBC # BLD AUTO: 3.12 M/UL (ref 4.1–5.7)
SERVICE CMNT-IMP: ABNORMAL
SERVICE CMNT-IMP: NORMAL
SERVICE CMNT-IMP: NORMAL
SODIUM SERPL-SCNC: 135 MMOL/L (ref 136–145)
WBC # BLD AUTO: 10.4 K/UL (ref 4.1–11.1)

## 2019-12-06 PROCEDURE — 36415 COLL VENOUS BLD VENIPUNCTURE: CPT

## 2019-12-06 PROCEDURE — 74011000250 HC RX REV CODE- 250: Performed by: INTERNAL MEDICINE

## 2019-12-06 PROCEDURE — 65270000029 HC RM PRIVATE

## 2019-12-06 PROCEDURE — 85027 COMPLETE CBC AUTOMATED: CPT

## 2019-12-06 PROCEDURE — 74011250636 HC RX REV CODE- 250/636: Performed by: HOSPITALIST

## 2019-12-06 PROCEDURE — 74011000250 HC RX REV CODE- 250: Performed by: HOSPITALIST

## 2019-12-06 PROCEDURE — 80048 BASIC METABOLIC PNL TOTAL CA: CPT

## 2019-12-06 PROCEDURE — 82962 GLUCOSE BLOOD TEST: CPT

## 2019-12-06 PROCEDURE — 74011636637 HC RX REV CODE- 636/637: Performed by: HOSPITALIST

## 2019-12-06 PROCEDURE — 74011250636 HC RX REV CODE- 250/636: Performed by: INTERNAL MEDICINE

## 2019-12-06 PROCEDURE — 74011250637 HC RX REV CODE- 250/637: Performed by: HOSPITALIST

## 2019-12-06 RX ORDER — POLYMYXIN B SULFATE AND TRIMETHOPRIM 1; 10000 MG/ML; [USP'U]/ML
1 SOLUTION OPHTHALMIC 2 TIMES DAILY
Status: DISCONTINUED | OUTPATIENT
Start: 2019-12-06 | End: 2019-12-11 | Stop reason: HOSPADM

## 2019-12-06 RX ADMIN — INSULIN LISPRO 2 UNITS: 100 INJECTION, SOLUTION INTRAVENOUS; SUBCUTANEOUS at 13:35

## 2019-12-06 RX ADMIN — HEPARIN SODIUM 5000 UNITS: 5000 INJECTION INTRAVENOUS; SUBCUTANEOUS at 03:27

## 2019-12-06 RX ADMIN — INSULIN LISPRO 3 UNITS: 100 INJECTION, SOLUTION INTRAVENOUS; SUBCUTANEOUS at 22:22

## 2019-12-06 RX ADMIN — DOCUSATE SODIUM 100 MG: 100 CAPSULE, LIQUID FILLED ORAL at 08:49

## 2019-12-06 RX ADMIN — HEPARIN SODIUM 5000 UNITS: 5000 INJECTION INTRAVENOUS; SUBCUTANEOUS at 11:10

## 2019-12-06 RX ADMIN — Medication 2 G: at 11:10

## 2019-12-06 RX ADMIN — SERTRALINE HYDROCHLORIDE 25 MG: 50 TABLET ORAL at 22:23

## 2019-12-06 RX ADMIN — Medication 10 ML: at 22:24

## 2019-12-06 RX ADMIN — CLOTRIMAZOLE AND BETAMETHASONE DIPROPIONATE: 10; .5 CREAM TOPICAL at 17:51

## 2019-12-06 RX ADMIN — HEPARIN SODIUM 5000 UNITS: 5000 INJECTION INTRAVENOUS; SUBCUTANEOUS at 19:23

## 2019-12-06 RX ADMIN — MAGNESIUM OXIDE TAB 400 MG (241.3 MG ELEMENTAL MG) 400 MG: 400 (241.3 MG) TAB at 08:49

## 2019-12-06 RX ADMIN — INSULIN LISPRO 5 UNITS: 100 INJECTION, SOLUTION INTRAVENOUS; SUBCUTANEOUS at 17:44

## 2019-12-06 RX ADMIN — POLYMYXIN B SULFATE, TRIMETHOPRIM SULFATE 1 DROP: 10000; 1 SOLUTION/ DROPS OPHTHALMIC at 17:44

## 2019-12-06 RX ADMIN — Medication 5 ML: at 14:00

## 2019-12-06 RX ADMIN — BUMETANIDE 1 MG: 0.25 INJECTION INTRAMUSCULAR; INTRAVENOUS at 11:10

## 2019-12-06 RX ADMIN — CLOTRIMAZOLE AND BETAMETHASONE DIPROPIONATE: 10; .5 CREAM TOPICAL at 11:11

## 2019-12-06 RX ADMIN — Medication 2 G: at 03:27

## 2019-12-06 RX ADMIN — INSULIN GLARGINE 20 UNITS: 100 INJECTION, SOLUTION SUBCUTANEOUS at 22:21

## 2019-12-06 RX ADMIN — Medication 2 G: at 19:11

## 2019-12-06 RX ADMIN — BUMETANIDE 1 MG: 0.25 INJECTION INTRAMUSCULAR; INTRAVENOUS at 22:22

## 2019-12-06 NOTE — PROGRESS NOTES
RN contacted Dr. Kary Ortiz because patient has Bumex 1 mg due with /72. Per FELIPA bernal to give medication.

## 2019-12-06 NOTE — PROGRESS NOTES
Bedside and Verbal shift change report given to [facility] Zhang (oncoming nurse) by Cece Humphrey (offgoing nurse). Report included the following information SBAR.

## 2019-12-06 NOTE — PROGRESS NOTES
Bedside and Verbal shift change report given to Gume santiago RN (oncoming nurse) by Senthil Franklin RN (offgoing nurse). Report included the following information SBAR, Kardex, Intake/Output, MAR and Recent Results.

## 2019-12-06 NOTE — PROGRESS NOTES
[facility] [facility] Adult  Hospitalist Group                                                                                          Hospitalist Progress Note  [facility] R[facility]os Kasia,   Answering service: [phone] from in house phone        Date of Service:  2019  NAME:  Randi Arce  :  1950  MRN:  [id number]      Admission Summary:   40-year-old male with past medical history of venous stasis presented to [facility] with lower extremity edema. Interval history / Subjective:   Patient seen and examined. Left lower extremity swelling improving but persistent. New bilateral conjunctivitis noted by nursing staff with medial purulent drainage. Assessment & Plan:     Cellulitis/Erysipelas of the left lower extremity: slow improvement  Bilateral venous stasis:  -Transition antibiotics to Ancef monotherapy  -appreciate wound care  -lower extremity dopplers negative  -echo pending  -Blood cultures negative  -continue bumex     Conjunctivitis, bacterial:   -initiated therapy , consider ophthalmology consult pending clinical course     DM, Type II with hyperglycemia:   -lantus 20 units, SSI    Hyponatremia: resolved  Renal insufficiency: resolved  Impaired hearing: supportive care  Morbid obesity       Code status: full   DVT prophylaxis: heparin     Care Plan discussed with: Patient/Family  Disposition: TBD      Hospital Problems  Date Reviewed: 2019          Codes Class Noted POA    * (Principal) Cellulitis of lower extremity ICD-10-CM: L03.119  ICD-9-CM: 682.6  2019 Unknown                Review of Systems:   Negative unless stated above       Vital Signs:    Last 24hrs VS reviewed since prior progress note.  Most recent are:  Visit Vitals  /69 (BP 1 Location: Left arm, BP Patient Position: At rest)   Pulse 84   Temp 98 °F (36.7 °C)   Resp 16   Ht 5' 11\" (1.803 m)   Wt 107.7 kg (237 lb 6.4 oz)   SpO2 94%   BMI 33.11 kg/m²         Intake/Output Summary (Last 24 hours) at 12/6/2019 1659  Last data filed at 12/6/2019 1649  Gross per 24 hour   Intake 480 ml   Output    Net 480 ml        Physical Examination:             Constitutional:  No acute distress, cooperative, pleasant, obese   ENT:  bilateral conjunctivitis with medial purulent drainage    Resp:  CTA bilaterally. No wheezing/rhonchi/rales. No accessory muscle use   CV:  Regular rhythm, normal rate, no murmurs, gallops, rubs    GI:  Soft, non distended, non tender    Musculoskeletal:  Erythema bilaterally, left greater than right, weeping, left leg erythema extends to lateral foot    Neurologic:  Moves all extremities          Data Review:    Review and/or order of clinical lab test      Labs:     Recent Labs     12/06/19 0340 12/05/19 0317   WBC 10.4 13.1*   HGB 9.5* 10.5*   HCT 29.8* 32.9*    299     Recent Labs     12/06/19 0340 12/05/19 0317 12/04/19  1127   * 136 134*   K 3.8 3.9 4.4    99 95*   CO2 31 31 30   BUN 17 21* 29*   CREA 1.14 1.06 1.34*   * 102* 254*   CA 8.3* 8.4* 9.2   MG  --  1.6 1.4*     Recent Labs     12/05/19 0317 12/04/19  1127   SGOT 11* 9*   ALT 13 17   AP 90 106   TBILI 0.4 0.4   TP 6.5 7.5   ALB 2.7* 3.3*   GLOB 3.8 4.2*     No results for input(s): INR, PTP, APTT, INREXT, INREXT in the last 72 hours. No results for input(s): FE, TIBC, PSAT, FERR in the last 72 hours. No results found for: FOL, RBCF   No results for input(s): PH, PCO2, PO2 in the last 72 hours. No results for input(s): CPK, CKNDX, TROIQ in the last 72 hours.     No lab exists for component: CPKMB  No results found for: CHOL, CHOLX, CHLST, CHOLV, HDL, HDLP, LDL, LDLC, DLDLP, TGLX, TRIGL, TRIGP, CHHD, CHHDX  Lab Results   Component Value Date/Time    Glucose (POC) 287 (H) 12/06/2019 04:02 PM    Glucose (POC) 185 (H) 12/06/2019 11:36 AM    Glucose (POC) 90 12/06/2019 06:12 AM    Glucose (POC) 256 (H) 12/05/2019 09:28 PM    Glucose (POC) 261 (H) 12/05/2019 05:30 PM     No results found for:  COLOR, APPRN, SPGRU, REFSG, HARRIET, PROTU, GLUCU, KETU, BILU, UROU, LEVI, LEUKU, GLUKE, EPSU, BACTU, WBCU, RBCU, CASTS, UCRY      Medications Reviewed:     Current Facility-Administered Medications   Medication Dose Route Frequency    trimethoprim-polymyxin b (POLYTRIM) 10,000 unit- 1 mg/mL ophthalmic solution 1 Drop  1 Drop Both Eyes BID    ceFAZolin (ANCEF) 2 g/20 mL in sterile water IV syringe  2 g IntraVENous Q8H    sodium chloride (NS) flush 5-40 mL  5-40 mL IntraVENous Q8H    sodium chloride (NS) flush 5-40 mL  5-40 mL IntraVENous PRN    heparin (porcine) injection 5,000 Units  5,000 Units SubCUTAneous Q8H    insulin lispro (HUMALOG) injection   SubCUTAneous AC&HS    glucose chewable tablet 16 g  4 Tab Oral PRN    glucagon (GLUCAGEN) injection 1 mg  1 mg IntraMUSCular PRN    bumetanide (BUMEX) injection 1 mg  1 mg IntraVENous Q12H    insulin glargine (LANTUS) injection 20 Units  20 Units SubCUTAneous QHS    magnesium oxide (MAG-OX) tablet 400 mg  400 mg Oral DAILY    melatonin tablet 3 mg  3 mg Oral QHS PRN    sertraline (ZOLOFT) tablet 25 mg  25 mg Oral QHS    loperamide (IMODIUM) capsule 2 mg  2 mg Oral Q6H PRN    docusate sodium (COLACE) capsule 100 mg  100 mg Oral DAILY    clotrimazole-betamethasone (LOTRISONE) 1-0.05 % cream   Topical BID     ______________________________________________________________________  EXPECTED LENGTH OF STAY: - - -  ACTUAL LENGTH OF STAY:          2                 Flores Mendez,

## 2019-12-06 NOTE — PROGRESS NOTES
Bedside and Verbal shift change report given to Noelle Hunt RN (oncoming nurse) by Agustin Jaime RN (offgoing nurse). Report included the following information SBAR, Kardex and MAR.

## 2019-12-06 NOTE — PROGRESS NOTES
Paged hospitalist for BP of 92/42 and scheduled bumex. Per hospitalist hold bumex and recheck patient's bp in an hour, if still low ok to hold tonight.

## 2019-12-06 NOTE — PROGRESS NOTES
JONELLE: Patient resides at [facility] [phone]. The patient is WC bound and will likely need BLS at discharge- discussed with Jose Luis Blancas (brother) [phone]. Irving Posadas is listed as the son on the face sheet,  he is the brother)    Care Management Interventions  PCP Verified by CM: Yes(Dr. Jimenez-follows at the facility)  Palliative Care Criteria Met (RRAT>21 & CHF Dx)?: No  Mode of Transport at Discharge: BLS  Transition of Care Consult (CM Consult): [address]: No  Reason Outside [place]: Patient already serviced by other home care/hospice agency(AT [address] per facility)  [address]: No  Discharge Durable Medical Equipment: No(has Walker and WC at facility)  Health Maintenance Reviewed: Yes  Physical Therapy Consult: No  Occupational Therapy Consult: No  Speech Therapy Consult: No  Current Support Network: Assisted Living([facility])  Confirm Follow Up Transport: Other (see comment)(facility)  Plan discussed with Pt/Family/Caregiver: No  Freedom of Choice Offered: Yes  [place] Resource Information Provided?: No  Discharge Location  Discharge Placement: Home with home health    Reason for Admission:   Cellulitis of lower extremities                   RRAT Score:  5                   Plan for utilizing home health:      Yes, patient open to AT [address] per the facility                    Current Advanced Directive/Advance Care Plan: Full Cod-ACP not on File. CRM offered assistance. The patient did not understand. CRM met with the patient, introduced self, explained role and offered supports. The patient is very hard of hearing. The patient resides at [facility] [phone]. Patient stated that his brother Devika Kothari handles his affairs. CRM called and spoke with Tyrone # above. Tyrone confirmed this. CRM called [facility] and spoke with nothingGrinder.  She stated that the patient is WC bound, but can transfer himself. The facility assists with all ADLS as needed. The patient is currently open to AT [address] [place] [address] [phone] for wound care. Per Henry- the last note in the patient's AL chart from the agency-( AT [address] [place] [address]) stated that Kim Moyer RN spoke with Dr. Donte Woodall PCP at the facility to facilitate transfer to the hospital for worsening wounds. CRM will follow for discharge planning/return to the AL when medically stable.     Mireya Jeffery, [address] [place] [address] Avenue   [phone]

## 2019-12-07 ENCOUNTER — APPOINTMENT (OUTPATIENT)
Dept: NON INVASIVE DIAGNOSTICS | Age: 69
DRG: 603 | End: 2019-12-07
Attending: HOSPITALIST
Payer: MEDICARE

## 2019-12-07 LAB
ANION GAP SERPL CALC-SCNC: 5 MMOL/L (ref 5–15)
BUN SERPL-MCNC: 13 MG/DL (ref 6–20)
BUN/CREAT SERPL: 13 (ref 12–20)
CALCIUM SERPL-MCNC: 8.5 MG/DL (ref 8.5–10.1)
CHLORIDE SERPL-SCNC: 99 MMOL/L (ref 97–108)
CO2 SERPL-SCNC: 32 MMOL/L (ref 21–32)
CREAT SERPL-MCNC: 0.99 MG/DL (ref 0.7–1.3)
ERYTHROCYTE [DISTWIDTH] IN BLOOD BY AUTOMATED COUNT: 12.9 % (ref 11.5–14.5)
GLUCOSE BLD STRIP.AUTO-MCNC: 105 MG/DL (ref 65–100)
GLUCOSE BLD STRIP.AUTO-MCNC: 128 MG/DL (ref 65–100)
GLUCOSE BLD STRIP.AUTO-MCNC: 231 MG/DL (ref 65–100)
GLUCOSE BLD STRIP.AUTO-MCNC: 289 MG/DL (ref 65–100)
GLUCOSE SERPL-MCNC: 127 MG/DL (ref 65–100)
HCT VFR BLD AUTO: 31.7 % (ref 36.6–50.3)
HGB BLD-MCNC: 10.1 G/DL (ref 12.1–17)
MCH RBC QN AUTO: 30.2 PG (ref 26–34)
MCHC RBC AUTO-ENTMCNC: 31.9 G/DL (ref 30–36.5)
MCV RBC AUTO: 94.9 FL (ref 80–99)
NRBC # BLD: 0 K/UL (ref 0–0.01)
NRBC BLD-RTO: 0 PER 100 WBC
PLATELET # BLD AUTO: 294 K/UL (ref 150–400)
PMV BLD AUTO: 8.6 FL (ref 8.9–12.9)
POTASSIUM SERPL-SCNC: 3.8 MMOL/L (ref 3.5–5.1)
RBC # BLD AUTO: 3.34 M/UL (ref 4.1–5.7)
SERVICE CMNT-IMP: ABNORMAL
SODIUM SERPL-SCNC: 136 MMOL/L (ref 136–145)
WBC # BLD AUTO: 10.4 K/UL (ref 4.1–11.1)

## 2019-12-07 PROCEDURE — 74011636637 HC RX REV CODE- 636/637: Performed by: HOSPITALIST

## 2019-12-07 PROCEDURE — 74011000250 HC RX REV CODE- 250: Performed by: HOSPITALIST

## 2019-12-07 PROCEDURE — 74011250636 HC RX REV CODE- 250/636: Performed by: HOSPITALIST

## 2019-12-07 PROCEDURE — 82962 GLUCOSE BLOOD TEST: CPT

## 2019-12-07 PROCEDURE — 36415 COLL VENOUS BLD VENIPUNCTURE: CPT

## 2019-12-07 PROCEDURE — 80048 BASIC METABOLIC PNL TOTAL CA: CPT

## 2019-12-07 PROCEDURE — 85027 COMPLETE CBC AUTOMATED: CPT

## 2019-12-07 PROCEDURE — 74011250636 HC RX REV CODE- 250/636: Performed by: INTERNAL MEDICINE

## 2019-12-07 PROCEDURE — 74011250637 HC RX REV CODE- 250/637: Performed by: HOSPITALIST

## 2019-12-07 PROCEDURE — 65270000029 HC RM PRIVATE

## 2019-12-07 RX ORDER — BUMETANIDE 1 MG/1
1 TABLET ORAL DAILY
Status: DISCONTINUED | OUTPATIENT
Start: 2019-12-08 | End: 2019-12-11 | Stop reason: HOSPADM

## 2019-12-07 RX ADMIN — CLOTRIMAZOLE AND BETAMETHASONE DIPROPIONATE: 10; .5 CREAM TOPICAL at 18:00

## 2019-12-07 RX ADMIN — Medication 2 G: at 02:45

## 2019-12-07 RX ADMIN — Medication 2 G: at 18:45

## 2019-12-07 RX ADMIN — POLYMYXIN B SULFATE, TRIMETHOPRIM SULFATE 1 DROP: 10000; 1 SOLUTION/ DROPS OPHTHALMIC at 10:08

## 2019-12-07 RX ADMIN — INSULIN LISPRO 3 UNITS: 100 INJECTION, SOLUTION INTRAVENOUS; SUBCUTANEOUS at 21:23

## 2019-12-07 RX ADMIN — SERTRALINE HYDROCHLORIDE 25 MG: 50 TABLET ORAL at 21:23

## 2019-12-07 RX ADMIN — MAGNESIUM OXIDE TAB 400 MG (241.3 MG ELEMENTAL MG) 400 MG: 400 (241.3 MG) TAB at 10:07

## 2019-12-07 RX ADMIN — BUMETANIDE 1 MG: 0.25 INJECTION INTRAMUSCULAR; INTRAVENOUS at 10:45

## 2019-12-07 RX ADMIN — HEPARIN SODIUM 5000 UNITS: 5000 INJECTION INTRAVENOUS; SUBCUTANEOUS at 20:22

## 2019-12-07 RX ADMIN — HEPARIN SODIUM 5000 UNITS: 5000 INJECTION INTRAVENOUS; SUBCUTANEOUS at 12:29

## 2019-12-07 RX ADMIN — INSULIN GLARGINE 20 UNITS: 100 INJECTION, SOLUTION SUBCUTANEOUS at 21:22

## 2019-12-07 RX ADMIN — POLYMYXIN B SULFATE, TRIMETHOPRIM SULFATE 1 DROP: 10000; 1 SOLUTION/ DROPS OPHTHALMIC at 18:01

## 2019-12-07 RX ADMIN — INSULIN LISPRO 3 UNITS: 100 INJECTION, SOLUTION INTRAVENOUS; SUBCUTANEOUS at 16:38

## 2019-12-07 RX ADMIN — HEPARIN SODIUM 5000 UNITS: 5000 INJECTION INTRAVENOUS; SUBCUTANEOUS at 04:11

## 2019-12-07 RX ADMIN — Medication 10 ML: at 21:23

## 2019-12-07 RX ADMIN — DOCUSATE SODIUM 100 MG: 100 CAPSULE, LIQUID FILLED ORAL at 10:07

## 2019-12-07 RX ADMIN — Medication 10 ML: at 16:39

## 2019-12-07 RX ADMIN — CLOTRIMAZOLE AND BETAMETHASONE DIPROPIONATE: 10; .5 CREAM TOPICAL at 10:47

## 2019-12-07 RX ADMIN — Medication 2 G: at 11:00

## 2019-12-07 NOTE — PROGRESS NOTES
Bedside and Verbal shift change report given to Rashad Caballero RN (oncoming nurse) by Davin Anderson RN (offgoing nurse). Report included the following information SBAR, Kardex, Intake/Output, MAR and Accordion.

## 2019-12-07 NOTE — PROGRESS NOTES
[facility] [facility] Adult  Hospitalist Group                                                                                          Hospitalist Progress Note  Flores Chirinos,   Answering service: [phone] from in house phone        Date of Service:  2019  NAME:  Jose Thurman  :  1950  MRN:  [id number]      Admission Summary:   70-year-old male with past medical history of venous stasis presented to [facility] with lower extremity edema. Interval history / Subjective:   Patient seen and examined. No complaints. Left lower extremity with some improvement from day prior. Continues on IV antibiotics. Conjunctivitis improved. Assessment & Plan:     Cellulitis of the left lower extremity: slow improvement  Bilateral venous stasis:  -continue cefazolin, slow improvement  -appreciate wound care  -lower extremity dopplers negative  -echo pending  -Blood cultures negative  -transition bumex to oral once daily     Conjunctivitis, bacterial: improving  -initiated therapy     DM, Type II with hyperglycemia:   -lantus 20 units, SSI    Hyponatremia: resolved  Renal insufficiency: resolved  Impaired hearing: supportive care  Morbid obesity     Code status: full   DVT prophylaxis: heparin     Care Plan discussed with: Patient/Family  Disposition: TBD   Needs remain inpatient due to extent of cellulitis and necessity for IV antibiotics      Hospital Problems  Date Reviewed: 2019          Codes Class Noted POA    * (Principal) Cellulitis of lower extremity ICD-10-CM: L03.119  ICD-9-CM: 682.6  2019 Unknown                Review of Systems:   Negative unless stated above       Vital Signs:    Last 24hrs VS reviewed since prior progress note.  Most recent are:  Visit Vitals  /74 (BP 1 Location: Right arm, BP Patient Position: At rest)   Pulse 93   Temp 98.1 °F (36.7 °C)   Resp 15   Ht 5' 11\" (1.803 m)   Wt 105.2 kg (232 lb)   SpO2 93%   BMI 32.36 kg/m² Intake/Output Summary (Last 24 hours) at 12/7/2019 1537  Last data filed at 12/6/2019 1649  Gross per 24 hour   Intake 240 ml   Output    Net 240 ml        Physical Examination:             Constitutional:  No acute distress, cooperative, pleasant, obese   ENT:  bilateral conjunctivitis with medial purulent drainage    Resp:  CTA bilaterally. No wheezing/rhonchi/rales. No accessory muscle use   CV:  Regular rhythm, normal rate, no murmurs, gallops, rubs    GI:  Soft, non distended, non tender    Musculoskeletal:  Erythema bilaterally, left greater than right, weeping, left leg erythema extends to lateral foot    Neurologic:  Moves all extremities          Data Review:    Review and/or order of clinical lab test      Labs:     Recent Labs     12/07/19 0251 12/06/19 0340   WBC 10.4 10.4   HGB 10.1* 9.5*   HCT 31.7* 29.8*    289     Recent Labs     12/07/19 0251 12/06/19 0340 12/05/19 0317    135* 136   K 3.8 3.8 3.9   CL 99 100 99   CO2 32 31 31   BUN 13 17 21*   CREA 0.99 1.14 1.06   * 114* 102*   CA 8.5 8.3* 8.4*   MG  --   --  1.6     Recent Labs     12/05/19 0317   SGOT 11*   ALT 13   AP 90   TBILI 0.4   TP 6.5   ALB 2.7*   GLOB 3.8     No results for input(s): INR, PTP, APTT, INREXT, INREXT in the last 72 hours. No results for input(s): FE, TIBC, PSAT, FERR in the last 72 hours. No results found for: FOL, RBCF   No results for input(s): PH, PCO2, PO2 in the last 72 hours. No results for input(s): CPK, CKNDX, TROIQ in the last 72 hours.     No lab exists for component: CPKMB  No results found for: CHOL, CHOLX, CHLST, CHOLV, HDL, HDLP, LDL, LDLC, DLDLP, TGLX, TRIGL, TRIGP, CHHD, CHHDX  Lab Results   Component Value Date/Time    Glucose (POC) 128 (H) 12/07/2019 11:37 AM    Glucose (POC) 105 (H) 12/07/2019 06:29 AM    Glucose (POC) 290 (H) 12/06/2019 09:16 PM    Glucose (POC) 287 (H) 12/06/2019 04:02 PM    Glucose (POC) 185 (H) 12/06/2019 11:36 AM     No results found for: COLOR, APPRN, SPGRU, REFSG, HARRIET, PROTU, GLUCU, KETU, BILU, UROU, LEVI, LEUKU, GLUKE, EPSU, BACTU, WBCU, RBCU, CASTS, UCRY      Medications Reviewed:     Current Facility-Administered Medications   Medication Dose Route Frequency    trimethoprim-polymyxin b (POLYTRIM) 10,000 unit- 1 mg/mL ophthalmic solution 1 Drop  1 Drop Both Eyes BID    ceFAZolin (ANCEF) 2 g/20 mL in sterile water IV syringe  2 g IntraVENous Q8H    sodium chloride (NS) flush 5-40 mL  5-40 mL IntraVENous Q8H    sodium chloride (NS) flush 5-40 mL  5-40 mL IntraVENous PRN    heparin (porcine) injection 5,000 Units  5,000 Units SubCUTAneous Q8H    insulin lispro (HUMALOG) injection   SubCUTAneous AC&HS    glucose chewable tablet 16 g  4 Tab Oral PRN    glucagon (GLUCAGEN) injection 1 mg  1 mg IntraMUSCular PRN    bumetanide (BUMEX) injection 1 mg  1 mg IntraVENous Q12H    insulin glargine (LANTUS) injection 20 Units  20 Units SubCUTAneous QHS    magnesium oxide (MAG-OX) tablet 400 mg  400 mg Oral DAILY    melatonin tablet 3 mg  3 mg Oral QHS PRN    sertraline (ZOLOFT) tablet 25 mg  25 mg Oral QHS    loperamide (IMODIUM) capsule 2 mg  2 mg Oral Q6H PRN    docusate sodium (COLACE) capsule 100 mg  100 mg Oral DAILY    clotrimazole-betamethasone (LOTRISONE) 1-0.05 % cream   Topical BID     ______________________________________________________________________  EXPECTED LENGTH OF STAY: - - -  ACTUAL LENGTH OF STAY:          [facility], DO

## 2019-12-07 NOTE — PROGRESS NOTES
Bedside shift change report given to Deann (oncoming nurse) by Jayant Echeverria (offgoing nurse). Report included the following information SBAR, Kardex, Intake/Output, MAR and Recent Results.

## 2019-12-08 ENCOUNTER — APPOINTMENT (OUTPATIENT)
Dept: NON INVASIVE DIAGNOSTICS | Age: 69
DRG: 603 | End: 2019-12-08
Attending: INTERNAL MEDICINE
Payer: MEDICARE

## 2019-12-08 LAB
ANION GAP SERPL CALC-SCNC: 7 MMOL/L (ref 5–15)
AV VELOCITY RATIO: 0.71
BUN SERPL-MCNC: 17 MG/DL (ref 6–20)
BUN/CREAT SERPL: 15 (ref 12–20)
CALCIUM SERPL-MCNC: 9 MG/DL (ref 8.5–10.1)
CHLORIDE SERPL-SCNC: 99 MMOL/L (ref 97–108)
CO2 SERPL-SCNC: 31 MMOL/L (ref 21–32)
CREAT SERPL-MCNC: 1.11 MG/DL (ref 0.7–1.3)
ECHO AV PEAK GRADIENT: 9.7 MMHG
ECHO AV PEAK VELOCITY: 155.46 CM/S
ECHO LA MAJOR AXIS: 3.97 CM
ECHO LV E' LATERAL VELOCITY: 7.92 CM/S
ECHO LV E' SEPTAL VELOCITY: 8.8 CM/S
ECHO LV INTERNAL DIMENSION DIASTOLIC: 4.45 CM (ref 4.2–5.9)
ECHO LV INTERNAL DIMENSION SYSTOLIC: 2.27 CM
ECHO LV IVSD: 1.2 CM (ref 0.6–1)
ECHO LV MASS 2D: 211.4 G (ref 88–224)
ECHO LV MASS INDEX 2D: 94.2 G/M2 (ref 49–115)
ECHO LV POSTERIOR WALL DIASTOLIC: 1.08 CM (ref 0.6–1)
ECHO LVOT PEAK GRADIENT: 4.8 MMHG
ECHO LVOT PEAK VELOCITY: 109.81 CM/S
ECHO MV A VELOCITY: 102.21 CM/S
ECHO MV AREA PHT: 4.9 CM2
ECHO MV E DECELERATION TIME (DT): 153.5 MS
ECHO MV E VELOCITY: 75.93 CM/S
ECHO MV E/A RATIO: 0.74
ECHO MV E/E' LATERAL: 9.59
ECHO MV E/E' RATIO (AVERAGED): 9.11
ECHO MV E/E' SEPTAL: 8.63
ECHO MV PRESSURE HALF TIME (PHT): 44.5 MS
ECHO PV MAX VELOCITY: 124.36 CM/S
ECHO PV PEAK GRADIENT: 6.2 MMHG
ECHO RV INTERNAL DIMENSION: 3.97 CM
ECHO RV TAPSE: 2.25 CM (ref 1.5–2)
ECHO TV REGURGITANT MAX VELOCITY: 300.99 CM/S
ECHO TV REGURGITANT PEAK GRADIENT: 36.2 MMHG
GLUCOSE BLD STRIP.AUTO-MCNC: 138 MG/DL (ref 65–100)
GLUCOSE BLD STRIP.AUTO-MCNC: 150 MG/DL (ref 65–100)
GLUCOSE BLD STRIP.AUTO-MCNC: 165 MG/DL (ref 65–100)
GLUCOSE BLD STRIP.AUTO-MCNC: 228 MG/DL (ref 65–100)
GLUCOSE SERPL-MCNC: 152 MG/DL (ref 65–100)
LVFS 2D: 49.09 %
MV DEC SLOPE: 4.95
POTASSIUM SERPL-SCNC: 3.8 MMOL/L (ref 3.5–5.1)
SERVICE CMNT-IMP: ABNORMAL
SODIUM SERPL-SCNC: 137 MMOL/L (ref 136–145)

## 2019-12-08 PROCEDURE — C8929 TTE W OR WO FOL WCON,DOPPLER: HCPCS

## 2019-12-08 PROCEDURE — 74011000250 HC RX REV CODE- 250: Performed by: INTERNAL MEDICINE

## 2019-12-08 PROCEDURE — 80048 BASIC METABOLIC PNL TOTAL CA: CPT

## 2019-12-08 PROCEDURE — 74011250637 HC RX REV CODE- 250/637: Performed by: INTERNAL MEDICINE

## 2019-12-08 PROCEDURE — 74011250636 HC RX REV CODE- 250/636: Performed by: HOSPITALIST

## 2019-12-08 PROCEDURE — 36415 COLL VENOUS BLD VENIPUNCTURE: CPT

## 2019-12-08 PROCEDURE — 65270000029 HC RM PRIVATE

## 2019-12-08 PROCEDURE — 74011250636 HC RX REV CODE- 250/636: Performed by: INTERNAL MEDICINE

## 2019-12-08 PROCEDURE — 82962 GLUCOSE BLOOD TEST: CPT

## 2019-12-08 PROCEDURE — 74011636637 HC RX REV CODE- 636/637: Performed by: HOSPITALIST

## 2019-12-08 PROCEDURE — 74011250637 HC RX REV CODE- 250/637: Performed by: HOSPITALIST

## 2019-12-08 RX ORDER — METFORMIN HYDROCHLORIDE 500 MG/1
1000 TABLET ORAL 2 TIMES DAILY WITH MEALS
Status: DISCONTINUED | OUTPATIENT
Start: 2019-12-08 | End: 2019-12-11 | Stop reason: HOSPADM

## 2019-12-08 RX ORDER — CEPHALEXIN 500 MG/1
500 CAPSULE ORAL 4 TIMES DAILY
Qty: 20 CAP | Refills: 0 | Status: SHIPPED | OUTPATIENT
Start: 2019-12-08 | End: 2019-12-11

## 2019-12-08 RX ORDER — POLYMYXIN B SULFATE AND TRIMETHOPRIM 1; 10000 MG/ML; [USP'U]/ML
1 SOLUTION OPHTHALMIC 4 TIMES DAILY
Qty: 10 ML | Refills: 0 | Status: SHIPPED | OUTPATIENT
Start: 2019-12-08 | End: 2019-12-13

## 2019-12-08 RX ORDER — GLIPIZIDE 5 MG/1
5 TABLET ORAL
Status: DISCONTINUED | OUTPATIENT
Start: 2019-12-09 | End: 2019-12-11 | Stop reason: HOSPADM

## 2019-12-08 RX ORDER — BUMETANIDE 1 MG/1
1 TABLET ORAL DAILY
Qty: 30 TAB | Refills: 0 | Status: SHIPPED | OUTPATIENT
Start: 2019-12-09

## 2019-12-08 RX ADMIN — SERTRALINE HYDROCHLORIDE 25 MG: 50 TABLET ORAL at 21:57

## 2019-12-08 RX ADMIN — INSULIN LISPRO 2 UNITS: 100 INJECTION, SOLUTION INTRAVENOUS; SUBCUTANEOUS at 21:57

## 2019-12-08 RX ADMIN — INSULIN LISPRO 2 UNITS: 100 INJECTION, SOLUTION INTRAVENOUS; SUBCUTANEOUS at 07:12

## 2019-12-08 RX ADMIN — SODIUM CHLORIDE 1.5 ML: 9 INJECTION INTRAMUSCULAR; INTRAVENOUS; SUBCUTANEOUS at 09:00

## 2019-12-08 RX ADMIN — DOCUSATE SODIUM 100 MG: 100 CAPSULE, LIQUID FILLED ORAL at 09:18

## 2019-12-08 RX ADMIN — Medication 2 G: at 19:27

## 2019-12-08 RX ADMIN — CLOTRIMAZOLE AND BETAMETHASONE DIPROPIONATE: 10; .5 CREAM TOPICAL at 09:18

## 2019-12-08 RX ADMIN — METFORMIN HYDROCHLORIDE 1000 MG: 500 TABLET ORAL at 17:17

## 2019-12-08 RX ADMIN — HEPARIN SODIUM 5000 UNITS: 5000 INJECTION INTRAVENOUS; SUBCUTANEOUS at 12:23

## 2019-12-08 RX ADMIN — HEPARIN SODIUM 5000 UNITS: 5000 INJECTION INTRAVENOUS; SUBCUTANEOUS at 04:25

## 2019-12-08 RX ADMIN — INSULIN GLARGINE 20 UNITS: 100 INJECTION, SOLUTION SUBCUTANEOUS at 21:57

## 2019-12-08 RX ADMIN — INSULIN LISPRO 2 UNITS: 100 INJECTION, SOLUTION INTRAVENOUS; SUBCUTANEOUS at 12:24

## 2019-12-08 RX ADMIN — MAGNESIUM OXIDE TAB 400 MG (241.3 MG ELEMENTAL MG) 400 MG: 400 (241.3 MG) TAB at 09:18

## 2019-12-08 RX ADMIN — BUMETANIDE 1 MG: 1 TABLET ORAL at 09:18

## 2019-12-08 RX ADMIN — Medication 2 G: at 03:25

## 2019-12-08 RX ADMIN — Medication 10 ML: at 17:17

## 2019-12-08 RX ADMIN — HEPARIN SODIUM 5000 UNITS: 5000 INJECTION INTRAVENOUS; SUBCUTANEOUS at 19:30

## 2019-12-08 RX ADMIN — Medication 2 G: at 10:35

## 2019-12-08 NOTE — PROGRESS NOTES
Bedside shift change report given to Deann (oncoming nurse) by Paige Barnett (offgoing nurse). Report included the following information SBAR, Kardex, Intake/Output, MAR and Recent Results.

## 2019-12-08 NOTE — PROGRESS NOTES
JONELLE: Patient resides at [facility] [phone] and is ready for discharge. Cm contacted [facility] and the phone continued to ring with no answer. Cm informed MD. Mike completed [facility] ambulance transportation for tomorrow (will call) and folder with ambulance form placed on patients chart. Cm will follow up with [facility] to confirm discharge plan. No other needs identified at this time.

## 2019-12-08 NOTE — PROGRESS NOTES
[address] [facility] Adult  Hospitalist Group                                                                                          Hospitalist Progress Note  [address] [facility],   Answering service: [phone] from in house phone        Date of Service:  2019  NAME:  Cinthya Bruce  :  1950  MRN:  [id number]      Admission Summary:   60-year-old male with past medical history of venous stasis presented to [facility] with lower extremity edema. Interval history / Subjective:   Patient seen and examined. Requesting to go home. Continues on IV antibiotics. Overall, cellulitis with improvement. Assessment & Plan:     Cellulitis of the left lower extremity: Improving  Bilateral venous stasis:  -continue cefazolin, slow improvement  --Transition to oral Keflex on discharge  -Continue Bumex 1 mg  -appreciate wound care  -lower extremity dopplers negative  -echo with normal EF  -Blood cultures negative    Conjunctivitis, bacterial: improving  -initiated therapy     DM, Type II with hyperglycemia:   -lantus 20 units, SSI  -restart oral medications    Hyponatremia: resolved  Renal insufficiency: resolved  Impaired hearing: supportive care  Morbid obesity     Code status: full   DVT prophylaxis: heparin     Care Plan discussed with: Patient/Family  Disposition: TBD   Potential discharge      Hospital Problems  Date Reviewed: 2019          Codes Class Noted POA    * (Principal) Cellulitis of lower extremity ICD-10-CM: L03.119  ICD-9-CM: 682.6  2019 Unknown                Review of Systems:   Negative unless stated above       Vital Signs:    Last 24hrs VS reviewed since prior progress note.  Most recent are:  Visit Vitals  /67 (BP 1 Location: Right arm, BP Patient Position: At rest)   Pulse 95   Temp 98.7 °F (37.1 °C)   Resp 16   Ht 5' 11\" (1.803 m)   Wt 105 kg (231 lb 7.7 oz)   SpO2 90%   BMI 32.29 kg/m²       No intake or output data in the 24 hours ending 12/08/19 1631     Physical Examination:             Constitutional:  No acute distress, cooperative, pleasant, obese   ENT:  bilateral conjunctivitis with medial purulent drainage    Resp:  CTA bilaterally. No wheezing/rhonchi/rales. No accessory muscle use   CV:  Regular rhythm, normal rate, no murmurs, gallops, rubs    GI:  Soft, non distended, non tender    Musculoskeletal:  Erythema bilaterally, left greater than right, left leg erythema extends to lateral foot    Neurologic:  Moves all extremities          Data Review:    Review and/or order of clinical lab test      Labs:     Recent Labs     12/07/19  0251 12/06/19  0340   WBC 10.4 10.4   HGB 10.1* 9.5*   HCT 31.7* 29.8*    289     Recent Labs     12/08/19  0440 12/07/19  0251 12/06/19  0340    136 135*   K 3.8 3.8 3.8   CL 99 99 100   CO2 31 32 31   BUN 17 13 17   CREA 1.11 0.99 1.14   * 127* 114*   CA 9.0 8.5 8.3*     No results for input(s): SGOT, GPT, ALT, AP, TBIL, TBILI, TP, ALB, GLOB, GGT, AML, LPSE in the last 72 hours. No lab exists for component: AMYP, HLPSE  No results for input(s): INR, PTP, APTT, INREXT, INREXT in the last 72 hours. No results for input(s): FE, TIBC, PSAT, FERR in the last 72 hours. No results found for: FOL, RBCF   No results for input(s): PH, PCO2, PO2 in the last 72 hours. No results for input(s): CPK, CKNDX, TROIQ in the last 72 hours.     No lab exists for component: CPKMB  No results found for: CHOL, CHOLX, CHLST, CHOLV, HDL, HDLP, LDL, LDLC, DLDLP, TGLX, TRIGL, TRIGP, CHHD, CHHDX  Lab Results   Component Value Date/Time    Glucose (POC) 138 (H) 12/08/2019 04:21 PM    Glucose (POC) 165 (H) 12/08/2019 11:48 AM    Glucose (POC) 150 (H) 12/08/2019 06:26 AM    Glucose (POC) 289 (H) 12/07/2019 09:15 PM    Glucose (POC) 231 (H) 12/07/2019 04:13 PM     No results found for: COLOR, APPRN, SPGRU, REFSG, HARRIET, PROTU, GLUCU, KETU, BILU, UROU, LEVI, LEUKU, GLUKE, EPSU, BACTU, WBCU, RBCU, CASTS, [facility]      Medications Reviewed:     Current Facility-Administered Medications   Medication Dose Route Frequency    bumetanide (BUMEX) tablet 1 mg  1 mg Oral DAILY    trimethoprim-polymyxin b (POLYTRIM) 10,000 unit- 1 mg/mL ophthalmic solution 1 Drop  1 Drop Both Eyes BID    ceFAZolin (ANCEF) 2 g/20 mL in sterile water IV syringe  2 g IntraVENous Q8H    sodium chloride (NS) flush 5-40 mL  5-40 mL IntraVENous Q8H    sodium chloride (NS) flush 5-40 mL  5-40 mL IntraVENous PRN    heparin (porcine) injection 5,000 Units  5,000 Units SubCUTAneous Q8H    insulin lispro (HUMALOG) injection   SubCUTAneous AC&HS    glucose chewable tablet 16 g  4 Tab Oral PRN    glucagon (GLUCAGEN) injection 1 mg  1 mg IntraMUSCular PRN    insulin glargine (LANTUS) injection 20 Units  20 Units SubCUTAneous QHS    magnesium oxide (MAG-OX) tablet 400 mg  400 mg Oral DAILY    melatonin tablet 3 mg  3 mg Oral QHS PRN    sertraline (ZOLOFT) tablet 25 mg  25 mg Oral QHS    loperamide (IMODIUM) capsule 2 mg  2 mg Oral Q6H PRN    docusate sodium (COLACE) capsule 100 mg  100 mg Oral DAILY    clotrimazole-betamethasone (LOTRISONE) 1-0.05 % cream   Topical BID     ______________________________________________________________________  EXPECTED LENGTH OF STAY: - - -  ACTUAL LENGTH OF STAY:          [address], DO

## 2019-12-08 NOTE — PROGRESS NOTES
Patient's brother Cresencio Rivers called to check on patient. Patient's brother said he's unable to get in touch with him, and he asked if we could check his cell phone and write his number on the white board. Nurse took number and will write on white board.

## 2019-12-08 NOTE — PROGRESS NOTES
Bedside and Verbal shift change report given to Allen Barba (oncoming nurse) by Cee Perry (offgoing nurse). Report included the following information SBAR, Kardex and MAR.

## 2019-12-09 LAB
BACTERIA SPEC CULT: NORMAL
GLUCOSE BLD STRIP.AUTO-MCNC: 147 MG/DL (ref 65–100)
GLUCOSE BLD STRIP.AUTO-MCNC: 215 MG/DL (ref 65–100)
GLUCOSE BLD STRIP.AUTO-MCNC: 225 MG/DL (ref 65–100)
GLUCOSE BLD STRIP.AUTO-MCNC: 258 MG/DL (ref 65–100)
SERVICE CMNT-IMP: ABNORMAL
SERVICE CMNT-IMP: NORMAL

## 2019-12-09 PROCEDURE — 74011636637 HC RX REV CODE- 636/637: Performed by: HOSPITALIST

## 2019-12-09 PROCEDURE — 65270000029 HC RM PRIVATE

## 2019-12-09 PROCEDURE — 74011250637 HC RX REV CODE- 250/637: Performed by: INTERNAL MEDICINE

## 2019-12-09 PROCEDURE — 82962 GLUCOSE BLOOD TEST: CPT

## 2019-12-09 PROCEDURE — 74011250637 HC RX REV CODE- 250/637: Performed by: HOSPITALIST

## 2019-12-09 PROCEDURE — 97161 PT EVAL LOW COMPLEX 20 MIN: CPT

## 2019-12-09 PROCEDURE — 74011250636 HC RX REV CODE- 250/636: Performed by: HOSPITALIST

## 2019-12-09 PROCEDURE — 97530 THERAPEUTIC ACTIVITIES: CPT

## 2019-12-09 PROCEDURE — 74011250636 HC RX REV CODE- 250/636: Performed by: INTERNAL MEDICINE

## 2019-12-09 RX ORDER — CEPHALEXIN 250 MG/1
500 CAPSULE ORAL 4 TIMES DAILY
Status: DISCONTINUED | OUTPATIENT
Start: 2019-12-09 | End: 2019-12-11 | Stop reason: HOSPADM

## 2019-12-09 RX ADMIN — SERTRALINE HYDROCHLORIDE 25 MG: 50 TABLET ORAL at 22:31

## 2019-12-09 RX ADMIN — MAGNESIUM OXIDE TAB 400 MG (241.3 MG ELEMENTAL MG) 400 MG: 400 (241.3 MG) TAB at 09:44

## 2019-12-09 RX ADMIN — METFORMIN HYDROCHLORIDE 1000 MG: 500 TABLET ORAL at 09:44

## 2019-12-09 RX ADMIN — GLIPIZIDE 5 MG: 5 TABLET ORAL at 06:31

## 2019-12-09 RX ADMIN — INSULIN LISPRO 5 UNITS: 100 INJECTION, SOLUTION INTRAVENOUS; SUBCUTANEOUS at 17:11

## 2019-12-09 RX ADMIN — METFORMIN HYDROCHLORIDE 1000 MG: 500 TABLET ORAL at 18:05

## 2019-12-09 RX ADMIN — CLOTRIMAZOLE AND BETAMETHASONE DIPROPIONATE: 10; .5 CREAM TOPICAL at 20:00

## 2019-12-09 RX ADMIN — HEPARIN SODIUM 5000 UNITS: 5000 INJECTION INTRAVENOUS; SUBCUTANEOUS at 13:49

## 2019-12-09 RX ADMIN — DOCUSATE SODIUM 100 MG: 100 CAPSULE, LIQUID FILLED ORAL at 09:44

## 2019-12-09 RX ADMIN — CEPHALEXIN 500 MG: 250 CAPSULE ORAL at 22:31

## 2019-12-09 RX ADMIN — MELATONIN 3 MG: at 01:53

## 2019-12-09 RX ADMIN — INSULIN LISPRO 2 UNITS: 100 INJECTION, SOLUTION INTRAVENOUS; SUBCUTANEOUS at 06:30

## 2019-12-09 RX ADMIN — CEPHALEXIN 500 MG: 250 CAPSULE ORAL at 18:05

## 2019-12-09 RX ADMIN — CEPHALEXIN 500 MG: 250 CAPSULE ORAL at 13:49

## 2019-12-09 RX ADMIN — HEPARIN SODIUM 5000 UNITS: 5000 INJECTION INTRAVENOUS; SUBCUTANEOUS at 20:26

## 2019-12-09 RX ADMIN — INSULIN GLARGINE 20 UNITS: 100 INJECTION, SOLUTION SUBCUTANEOUS at 22:33

## 2019-12-09 RX ADMIN — BUMETANIDE 1 MG: 1 TABLET ORAL at 09:54

## 2019-12-09 RX ADMIN — Medication 2 G: at 02:05

## 2019-12-09 RX ADMIN — INSULIN LISPRO 2 UNITS: 100 INJECTION, SOLUTION INTRAVENOUS; SUBCUTANEOUS at 22:31

## 2019-12-09 RX ADMIN — HEPARIN SODIUM 5000 UNITS: 5000 INJECTION INTRAVENOUS; SUBCUTANEOUS at 03:36

## 2019-12-09 RX ADMIN — INSULIN LISPRO 3 UNITS: 100 INJECTION, SOLUTION INTRAVENOUS; SUBCUTANEOUS at 13:49

## 2019-12-09 RX ADMIN — CLOTRIMAZOLE AND BETAMETHASONE DIPROPIONATE: 10; .5 CREAM TOPICAL at 09:56

## 2019-12-09 NOTE — PROGRESS NOTES
Bedside and Verbal shift change report given to Linsey Bird RN (oncoming nurse) by Renu Vilchis RN (offgoing nurse). Report included the following information SBAR, Kardex, Intake/Output, MAR and Recent Results.

## 2019-12-09 NOTE — PROGRESS NOTES
Nurse Lelo Oliveira gave bedside report to oncoming nurse Gerardo Story RN by [employer] and [facility]

## 2019-12-09 NOTE — PROGRESS NOTES
Bedside and Verbal shift change report given to Collette Nielsen (oncoming nurse) by Ariadne Tate (offgoing nurse). Report included the following information SBAR, Kardex, MAR and Recent Results.

## 2019-12-09 NOTE — PROGRESS NOTES
Problem: Mobility Impaired (Adult and Pediatric)  Goal: *Acute Goals and Plan of Care (Insert Text)  Description  FUNCTIONAL STATUS PRIOR TO ADMISSION: Patient was modified independent using a rolling walker and wheelchair for functional mobility. HOME SUPPORT PRIOR TO ADMISSION: The patient lived with support in an adult home, primarily wheelchair dependent per notes, but he also reports he has a walker. Physical Therapy Goals  Initiated 12/9/2019  1. Patient will move from supine to sit and sit to supine  and roll side to side in bed with supervision/set-up within 7 day(s). 2.  Patient will transfer from bed to chair and chair to bed with supervision/set-up using the least restrictive device within 7 day(s). 3.  Patient will perform sit to stand with supervision/set-up within 7 day(s). 4.  Patient will ambulate with supervision/set-up for 50 feet with the least restrictive device within 7 day(s). Outcome: Progressing Towards Goal   PHYSICAL THERAPY EVALUATION  Patient: Ashley Pierson (1 y.o. male)  Date: 12/9/2019  Primary Diagnosis: Cellulitis of lower extremity [L03.119]        Precautions:   Contact, Fall      ASSESSMENT  Based on the objective data described below, the patient presents with decreased mobility after being admitted with LE cellulitis. He lives in an assisted living setting and is primarily wheelchair bound per notes. He is difficult to communicate with because of very poor hearing and limited reading. He was able to stand with the walker for hygiene and transferred to the recliner chair with walker with min assist.  He needs a lot of instruction and cueing, but he is able to support himself in standing effectively. He was not able to complete his own hygiene after BM, but washed his face when cued. Expect that he can do a lot of his own self care given clear instructions, with the exception of his pericare due to his obesity.   He would greatly benefit from increasing his activity with nursing, being OOB and doing his own self care with supervision. Recommend return to assisted living once medically ready since he is able to transfer to and from a wheelchair. We will continue to increase his activity and walking as much as he is able in order to improve his functional independence. Current Level of Function Impacting Discharge (mobility/balance): min assist for transfers    Functional Outcome Measure: The patient scored Total: 25/100 on the Barthel Index which is indicative of significantly impaired ability to care for basic self needs/dependency on others. Other factors to consider for discharge: poor safety awareness     Patient will benefit from skilled therapy intervention to address the above noted impairments. PLAN :  Recommendations and Planned Interventions: bed mobility training, transfer training, gait training, patient and family training/education, and therapeutic activities      Frequency/Duration: Patient will be followed by physical therapy:  3 times a week to address goals. Recommendation for discharge: (in order for the patient to meet his/her long term goals)  To be determined: recommend return to assisted living if they are able to manage his care, but otherwise SNF level rehab to increase his activity tolerance     This discharge recommendation:  Has been made in collaboration with the attending provider and/or case management    IF patient discharges home will need the following DME: patient owns DME required for discharge         SUBJECTIVE:   Patient stated I am going back to [facility] today! Ciarra Schoreder    OBJECTIVE DATA SUMMARY:   HISTORY:    No past medical history on file. No past surgical history on file.     Personal factors and/or comorbidities impacting plan of care: obesity, cellulitis, hard of hearing, poor insight         EXAMINATION/PRESENTATION/DECISION MAKING:   Critical Behavior:  Neurologic State: Alert  Orientation Level: Oriented to person, Oriented to place, Oriented to time, Disoriented to situation  Cognition: Impaired decision making     Hearing: Auditory  Auditory Impairment: Hard of hearing, bilateral, Hearing aid(s)  Hearing Aids/Status: Right, With patient  Skin:  dressing LLE, scaly and erythematous and edematous lower legs  Edema: bilateral LEs  Range Of Motion:  AROM: Generally decreased, functional(limited largely by his habitus)                       Strength:    Strength: Generally decreased, functional                    Tone & Sensation:   Tone: Normal              Sensation: Impaired(decreased in LEs)               Coordination:  Coordination: Within functional limits  Vision:      Functional Mobility:  Bed Mobility:     Supine to Sit: Contact guard assistance; Additional time;Bed Modified(using bedrails to assist, and HOB elevated)  Sit to Supine: (up in chair after)     Transfers:  Sit to Stand: Contact guard assistance; Adaptive equipment; Additional time  Stand to Sit: Contact guard assistance; Adaptive equipment; Additional time        Bed to Chair: Adaptive equipment; Additional time;Minimum assistance              Balance:   Sitting: Intact; Without support  Standing: Impaired; With support  Standing - Static: Good  Standing - Dynamic : Fair  Ambulation/Gait Training:  Distance (ft): 4 Feet (ft)  Assistive Device: Gait belt;Walker, rolling  Ambulation - Level of Assistance: Minimal assistance; Adaptive equipment; Additional time        Gait Abnormalities: Decreased step clearance;Shuffling gait;Trunk sway increased        Base of Support: Widened     Speed/Caitlin: Slow;Shuffled  Step Length: Left shortened;Right shortened        Interventions: Safety awareness training; Tactile cues; Verbal cues; Visual/Demos            Stairs:               Therapeutic Exercises:       Functional Measure:  Barthel Index:    Bathin  Bladder: 0  Bowels: 0  Groomin  Dressin  Feeding: 10  Mobility: 0  Stairs: 0  Toilet Use: 0  Transfer (Bed to Chair and Back): 10  Total: 25/100       The Barthel ADL Index: Guidelines  1. The index should be used as a record of what a patient does, not as a record of what a patient could do. 2. The main aim is to establish degree of independence from any help, physical or verbal, however minor and for whatever reason. 3. The need for supervision renders the patient not independent. 4. A patient's performance should be established using the best available evidence. Asking the patient, friends/relatives and nurses are the usual sources, but direct observation and common sense are also important. However direct testing is not needed. 5. Usually the patient's performance over the preceding 24-48 hours is important, but occasionally longer periods will be relevant. 6. Middle categories imply that the patient supplies over 50 per cent of the effort. 7. Use of aids to be independent is allowed. Nikunj Bello., Barthel, D.W. (8891). Functional evaluation: the Barthel Index. [facility] (14)2. NOELLE Leo, Laron Novak., Aneudy Thomas., Tristan, [facility] (1999). Measuring the change indisability after inpatient rehabilitation; comparison of the responsiveness of the Barthel Index and Functional Brown Measure. Journal of Neurology, Neurosurgery, and Psychiatry, 66(4), 602-299. ARIE Orantes, HAROON Benz, & Lawrence Winters, M.A. (2004.) Assessment of post-stroke quality of life in cost-effectiveness studies: The usefulness of the Barthel Index and the EuroQoL-5D.  Quality of Life Research, 15, 555-26        Physical Therapy Evaluation Charge Determination   History Examination Presentation Decision-Making   MEDIUM  Complexity : 1-2 comorbidities / personal factors will impact the outcome/ POC  MEDIUM Complexity : 3 Standardized tests and measures addressing body structure, function, activity limitation and / or participation in recreation  LOW Complexity : Stable, uncomplicated  LOW Complexity : FOTO score of       Based on the above components, the patient evaluation is determined to be of the following complexity level: LOW     Pain Rating:  No complaints of pain with activity    Activity Tolerance:   Good  Please refer to the flowsheet for vital signs taken during this treatment. After treatment patient left in no apparent distress:   Sitting in chair, Call bell within reach, and LEs elevated in recliner     COMMUNICATION/EDUCATION:   The patients plan of care was discussed with: Registered Nurse and . Fall prevention education was provided and the patient/caregiver indicated understanding., Patient/family have participated as able in goal setting and plan of care. , and Patient/family agree to work toward stated goals and plan of care.     Thank you for this referral.  Maxx Cooper, PT   Time Calculation: 27 mins

## 2019-12-09 NOTE — PROGRESS NOTES
[facility] [facility] Adult  Hospitalist Group                                                                                          Hospitalist Progress Note  [address] [facility],   Answering service: [phone] from in house phone        Date of Service:  2019  NAME:  Judge Alicea  :  1950  MRN:  [id number]      Admission Summary:   80-year-old male with past medical history of venous stasis presented to [facility] with lower extremity edema. Interval history / Subjective:   Patient seen and examined. Transition to oral antibiotics. Overall improving. CM for placement       Assessment & Plan:     Cellulitis of the left lower extremity: improving  Bilateral venous stasis:  -Transition cefazolin to oral keflex  -Continue Bumex 1 mg  -appreciate wound care  -lower extremity dopplers negative  -echo with normal EF  -Blood cultures negative    Conjunctivitis, bacterial: improving  -initiated therapy     DM, Type II with hyperglycemia:   -lantus 20 units, SSI  -continue oral medications    Hyponatremia: resolved  Renal insufficiency: resolved  Impaired hearing: supportive care  Morbid obesity     Code status: full   DVT prophylaxis: heparin     Care Plan discussed with: Patient/Family  Disposition: TBD awaiting placement     Hospital Problems  Date Reviewed: 2019          Codes Class Noted POA    * (Principal) Cellulitis of lower extremity ICD-10-CM: L03.119  ICD-9-CM: 682.6  2019 Unknown                Review of Systems:   Negative unless stated above       Vital Signs:    Last 24hrs VS reviewed since prior progress note.  Most recent are:  Visit Vitals  /72 (BP 1 Location: Left arm, BP Patient Position: At rest)   Pulse 97   Temp 99.2 °F (37.3 °C)   Resp 15   Ht 5' 11\" (1.803 m)   Wt 105 kg (231 lb 7.7 oz)   SpO2 98%   BMI 32.29 kg/m²       No intake or output data in the 24 hours ending 19 3189     Physical Examination:             Constitutional:  No acute distress, cooperative, pleasant, obese   ENT:  bilateral conjunctivitis with medial purulent drainage    Resp:  CTA bilaterally. No wheezing/rhonchi/rales. No accessory muscle use   CV:  Regular rhythm, normal rate, no murmurs, gallops, rubs    GI:  Soft, non distended, non tender    Musculoskeletal:  Erythema bilaterally, left greater than right, left leg erythema extends to lateral foot    Neurologic:  Moves all extremities          Data Review:    Review and/or order of clinical lab test      Labs:     Recent Labs     12/07/19  0251   WBC 10.4   HGB 10.1*   HCT 31.7*        Recent Labs     12/08/19  0440 12/07/19  0251    136   K 3.8 3.8   CL 99 99   CO2 31 32   BUN 17 13   CREA 1.11 0.99   * 127*   CA 9.0 8.5     No results for input(s): SGOT, GPT, ALT, AP, TBIL, TBILI, TP, ALB, GLOB, GGT, AML, LPSE in the last 72 hours. No lab exists for component: AMYP, HLPSE  No results for input(s): INR, PTP, APTT, INREXT, INREXT in the last 72 hours. No results for input(s): FE, TIBC, PSAT, FERR in the last 72 hours. No results found for: FOL, RBCF   No results for input(s): PH, PCO2, PO2 in the last 72 hours. No results for input(s): CPK, CKNDX, TROIQ in the last 72 hours.     No lab exists for component: CPKMB  No results found for: CHOL, CHOLX, CHLST, CHOLV, HDL, HDLP, LDL, LDLC, DLDLP, TGLX, TRIGL, TRIGP, CHHD, CHHDX  Lab Results   Component Value Date/Time    Glucose (POC) 258 (H) 12/09/2019 04:27 PM    Glucose (POC) 215 (H) 12/09/2019 11:20 AM    Glucose (POC) 147 (H) 12/09/2019 06:12 AM    Glucose (POC) 228 (H) 12/08/2019 09:23 PM    Glucose (POC) 138 (H) 12/08/2019 04:21 PM     No results found for: COLOR, APPRN, SPGRU, REFSG, HARRIET, PROTU, GLUCU, KETU, BILU, UROU, LEVI, LEUKU, GLUKE, EPSU, BACTU, WBCU, RBCU, CASTS, UCRY      Medications Reviewed:     Current Facility-Administered Medications   Medication Dose Route Frequency    cephALEXin (KEFLEX) capsule 500 mg  500 mg Oral QID    glipiZIDE (GLUCOTROL) tablet 5 mg  5 mg Oral ACB    metFORMIN (GLUCOPHAGE) tablet 1,000 mg  1,000 mg Oral BID WITH MEALS    bumetanide (BUMEX) tablet 1 mg  1 mg Oral DAILY    trimethoprim-polymyxin b (POLYTRIM) 10,000 unit- 1 mg/mL ophthalmic solution 1 Drop  1 Drop Both Eyes BID    sodium chloride (NS) flush 5-40 mL  5-40 mL IntraVENous Q8H    sodium chloride (NS) flush 5-40 mL  5-40 mL IntraVENous PRN    heparin (porcine) injection 5,000 Units  5,000 Units SubCUTAneous Q8H    insulin lispro (HUMALOG) injection   SubCUTAneous AC&HS    glucose chewable tablet 16 g  4 Tab Oral PRN    glucagon (GLUCAGEN) injection 1 mg  1 mg IntraMUSCular PRN    insulin glargine (LANTUS) injection 20 Units  20 Units SubCUTAneous QHS    magnesium oxide (MAG-OX) tablet 400 mg  400 mg Oral DAILY    melatonin tablet 3 mg  3 mg Oral QHS PRN    sertraline (ZOLOFT) tablet 25 mg  25 mg Oral QHS    loperamide (IMODIUM) capsule 2 mg  2 mg Oral Q6H PRN    docusate sodium (COLACE) capsule 100 mg  100 mg Oral DAILY    clotrimazole-betamethasone (LOTRISONE) 1-0.05 % cream   Topical BID     ______________________________________________________________________  EXPECTED LENGTH OF STAY: - - -  ACTUAL LENGTH OF STAY:          [facility],

## 2019-12-09 NOTE — PROGRESS NOTES
Patient pulled IV overnight. Per dolores Cardoso for patient to not have IV. ABX switched to PO. Awaiting placement and medically cleared.

## 2019-12-09 NOTE — PROGRESS NOTES
CM notes patient is a [employer]. JONELLE: Patient resides at [facility] [phone]. CRM spoke with Dolores Atkins with the facility this am. She stated that the  is out of town until Wednesday and has requested for this patient to go to a SNF until she is able to evaluate the patient for return. Dolores Atkins stated that they have spoken with the brother and he is aware. CRM spoke with the brother Luis Quinn [phone] for SNF choice-the patient has been to [facility] AND [facility] in the past. Referral sent via [employer]. CRM added an OT evaluation to the chart as well. Dr. Adriane Smith aware. [facility] letter phone consent placed on the hard chart. The Plan for Transition of Care is related to the following treatment goals: SNF    The Patient and/or patient representative Sinda Born patient's brother was provided with a choice of provider and agrees   with the discharge plan. [x] Yes [] No    Freedom of choice list was provided with basic dialogue that supports the patient's individualized plan of care/goals, treatment preferences and shares the quality data associated with the providers. [x] Yes [] No     The patient is at base line per PT. The patient's brother is hoping that the AL can evaluate on Wed so that the patient can return.  CRM will need to contact if the patient is still in the hospital.     Juventino Shepherd, [address] [place] [address] Avenue   [phone]

## 2019-12-10 LAB
GLUCOSE BLD STRIP.AUTO-MCNC: 110 MG/DL (ref 65–100)
GLUCOSE BLD STRIP.AUTO-MCNC: 144 MG/DL (ref 65–100)
GLUCOSE BLD STRIP.AUTO-MCNC: 164 MG/DL (ref 65–100)
GLUCOSE BLD STRIP.AUTO-MCNC: 204 MG/DL (ref 65–100)
SERVICE CMNT-IMP: ABNORMAL

## 2019-12-10 PROCEDURE — 82962 GLUCOSE BLOOD TEST: CPT

## 2019-12-10 PROCEDURE — 74011250636 HC RX REV CODE- 250/636: Performed by: HOSPITALIST

## 2019-12-10 PROCEDURE — 74011636637 HC RX REV CODE- 636/637: Performed by: HOSPITALIST

## 2019-12-10 PROCEDURE — 65270000032 HC RM SEMIPRIVATE

## 2019-12-10 PROCEDURE — 97535 SELF CARE MNGMENT TRAINING: CPT

## 2019-12-10 PROCEDURE — 97165 OT EVAL LOW COMPLEX 30 MIN: CPT

## 2019-12-10 PROCEDURE — 74011000250 HC RX REV CODE- 250: Performed by: INTERNAL MEDICINE

## 2019-12-10 PROCEDURE — 74011250637 HC RX REV CODE- 250/637: Performed by: HOSPITALIST

## 2019-12-10 PROCEDURE — 74011250637 HC RX REV CODE- 250/637: Performed by: INTERNAL MEDICINE

## 2019-12-10 RX ADMIN — METFORMIN HYDROCHLORIDE 1000 MG: 500 TABLET ORAL at 07:05

## 2019-12-10 RX ADMIN — METFORMIN HYDROCHLORIDE 1000 MG: 500 TABLET ORAL at 16:31

## 2019-12-10 RX ADMIN — GLIPIZIDE 5 MG: 5 TABLET ORAL at 07:05

## 2019-12-10 RX ADMIN — CEPHALEXIN 500 MG: 250 CAPSULE ORAL at 22:29

## 2019-12-10 RX ADMIN — POLYMYXIN B SULFATE, TRIMETHOPRIM SULFATE 1 DROP: 10000; 1 SOLUTION/ DROPS OPHTHALMIC at 10:13

## 2019-12-10 RX ADMIN — CLOTRIMAZOLE AND BETAMETHASONE DIPROPIONATE: 10; .5 CREAM TOPICAL at 17:05

## 2019-12-10 RX ADMIN — SERTRALINE HYDROCHLORIDE 25 MG: 50 TABLET ORAL at 22:30

## 2019-12-10 RX ADMIN — CEPHALEXIN 500 MG: 250 CAPSULE ORAL at 13:07

## 2019-12-10 RX ADMIN — INSULIN LISPRO 2 UNITS: 100 INJECTION, SOLUTION INTRAVENOUS; SUBCUTANEOUS at 11:30

## 2019-12-10 RX ADMIN — CEPHALEXIN 500 MG: 250 CAPSULE ORAL at 09:10

## 2019-12-10 RX ADMIN — HEPARIN SODIUM 5000 UNITS: 5000 INJECTION INTRAVENOUS; SUBCUTANEOUS at 22:29

## 2019-12-10 RX ADMIN — INSULIN LISPRO 3 UNITS: 100 INJECTION, SOLUTION INTRAVENOUS; SUBCUTANEOUS at 16:31

## 2019-12-10 RX ADMIN — HEPARIN SODIUM 5000 UNITS: 5000 INJECTION INTRAVENOUS; SUBCUTANEOUS at 07:05

## 2019-12-10 RX ADMIN — INSULIN GLARGINE 20 UNITS: 100 INJECTION, SOLUTION SUBCUTANEOUS at 22:29

## 2019-12-10 RX ADMIN — CEPHALEXIN 500 MG: 250 CAPSULE ORAL at 17:05

## 2019-12-10 RX ADMIN — BUMETANIDE 1 MG: 1 TABLET ORAL at 09:10

## 2019-12-10 RX ADMIN — HEPARIN SODIUM 5000 UNITS: 5000 INJECTION INTRAVENOUS; SUBCUTANEOUS at 14:32

## 2019-12-10 RX ADMIN — DOCUSATE SODIUM 100 MG: 100 CAPSULE, LIQUID FILLED ORAL at 09:10

## 2019-12-10 RX ADMIN — Medication 10 ML: at 22:00

## 2019-12-10 RX ADMIN — MAGNESIUM OXIDE TAB 400 MG (241.3 MG ELEMENTAL MG) 400 MG: 400 (241.3 MG) TAB at 09:10

## 2019-12-10 RX ADMIN — POLYMYXIN B SULFATE, TRIMETHOPRIM SULFATE 1 DROP: 10000; 1 SOLUTION/ DROPS OPHTHALMIC at 17:05

## 2019-12-10 NOTE — PROGRESS NOTES
Spiritual Care Partner Volunteer visited patient in room 510/02  on 12.10. 19. Documented by: : Rev. Ivone Olson.  Roxie Clark; [facility], to contact [phone] [address] call: [phone]

## 2019-12-10 NOTE — PROGRESS NOTES
JONELLE:    [facility] has accepted patient and can take him tomorrow. GREGORIO spoke with patient's son, El Burris [phone]. He said that he did speak with Lelia at [facility] and paid the balance owed, so they will accept patient. GREGORIO called Lelia at [facility] to confirm and left a message.  [facility] Nereida COTA RN/CRM

## 2019-12-10 NOTE — DIABETES MGMT
[facility]    [facility] Progress Note    Recommendations/ Comments: Chart review for hyperglycemia. If appropriate, please consider:  Resume glipizide - consider 2.5 mg BID (or can order 3 units AC lispro TID)    Current hospital DM medication: Lantus, 20 units daily  Lispro - normal corrections honorio    Chart reviewed on [facility]. Patient is a 71 y.o. male with known hx of DM on Lantus 10 units HS, Glipizide, 5 mg BID, Metformin, 1000 mg BID and Novolog sliding scale    A1c:   Lab Results   Component Value Date/Time    Hemoglobin A1c 8.3 (H) 12/04/2019 11:27 AM       Recent Glucose Results:   Lab Results   Component Value Date/Time    GLUCPOC 144 (H) 12/10/2019 10:24 AM    GLUCPOC 110 (H) 12/10/2019 06:49 AM    GLUCPOC 225 (H) 12/09/2019 09:55 PM        Lab Results   Component Value Date/Time    Creatinine 1.11 12/08/2019 04:40 AM     Estimated Creatinine Clearance: 76.8 mL/min (by C-G formula based on SCr of 1.11 mg/dL). Active Orders   Diet    DIET DIABETIC CONSISTENT CARB Regular        PO intake: No data found. Will continue to follow as needed.     Thank you  Rocael Ching RD

## 2019-12-10 NOTE — PROGRESS NOTES
Problem: Self Care Deficits Care Plan (Adult)  Goal: *Acute Goals and Plan of Care (Insert Text)  Description  FUNCTIONAL STATUS PRIOR TO ADMISSION: Patient is poor historian, however per chart was modified independent using a rolling walker and wheelchair for functional mobility. Unclear what level of assist patient requires for ADLs. Patient is very [address] HOME SUPPORT PRIOR TO ADMISSION: The patient lived with support in an adult home, primarily wheelchair dependent per notes, but he also reports he has a walker. Occupational Therapy Goals  Initiated 12/10/2019  1. Patient will perform upper body dressing with supervision/set-up within 7 day(s). 2.  Patient will perform grooming with supervision/set-up within 7 day(s). 3.  Patient will perform anterior upper body bathing with supervision/set-up within 7 day(s). 4.  Patient will perform toilet transfers with supervision/set-up within 7 day(s). 5.  Patient will perform all aspects of toileting with moderate assistance within 7 day(s). 6.  Patient will participate in upper extremity therapeutic exercise/activities with minimal assistance/contact guard assist for 5 minutes within 7 day(s). Outcome: Progressing Towards Goal     OCCUPATIONAL THERAPY EVALUATION  Patient: Ted Romo (81 y.o. male)  Date: 12/10/2019  Primary Diagnosis: Cellulitis of lower extremity [L03.119]        Precautions: Contact, Fall    ASSESSMENT  Based on the objective data described below, the patient presents with impaired balance, decreased functional reach, and decreased activity tolerance following admission for cellulitis of LEs . Patient is very [address] and requires loud repetition of all commands. He is able to sit EOB to perform bathing and gown change with assistance. When prompted to wash his arms, pt originally will respond \"I can't do that\" but is then able to perform task.  During standing, noted to be incontinent of bowel and required max to total A for hygiene and brief management. Patient is likely close to his baseline, however will continue to follow to maximize performance in ADLs and mobility. Anticipate patient able to return to [facility] with continued assist/supervision for functional tasks and mobility. Current Level of Function Impacting Discharge (ADLs/self-care): overall min/mod A upper body ADLs; mod/max A toileting tasks and lower body ADLs; Assist x1 transfer    Functional Outcome Measure: The patient scored Total: 25/100 on the Barthel Index outcome measure which is indicative of 75% impaired ability to care for basic self needs/dependency on others; inferred 100% dependency on others for instrumental ADLs. Other factors to consider for discharge: unclear level of assist; [facility]     Patient will benefit from skilled therapy intervention to address the above noted impairments. PLAN :  Recommendations and Planned Interventions: self care training, functional mobility training, therapeutic exercise, balance training, therapeutic activities, endurance activities, patient education, home safety training and family training/education    Frequency/Duration: Patient will be followed by occupational therapy 3 times a week to address goals. Recommendation for discharge: (in order for the patient to meet his/her long term goals)  To be determined: return to half-way vs. SNF    This discharge recommendation:  Has been made in collaboration with the attending provider and/or case management    IF patient discharges home will need the following DME: to be determined (TBD)       SUBJECTIVE:   Patient stated [facility]? What do you want me to do?  RN cleared patient for therapy. OBJECTIVE DATA SUMMARY:   HISTORY:   No past medical history on file. No past surgical history on file.     Expanded or extensive additional review of patient history:     Home Situation  [address] Name: [facility]    Hand dominance: Right    EXAMINATION OF PERFORMANCE DEFICITS:  Cognitive/Behavioral Status:  Neurologic State: Alert;Confused  Orientation Level: Oriented to person  Cognition: Follows commands; Impaired decision making  Perception: Appears intact  Perseveration: No perseveration noted  Safety/Judgement: Decreased awareness of environment;Decreased awareness of need for assistance;Decreased awareness of need for safety    Hearing: Auditory  Auditory Impairment: Hard of hearing, bilateral  Hearing Aids/Status: Right, With patient    Range of Motion:  AROM: Generally decreased, functional    Strength:  Strength: Generally decreased, functional    Coordination:  Coordination: Within functional limits  Fine Motor Skills-Upper: Left Intact; Right Intact    Gross Motor Skills-Upper: Left Intact; Right Intact    Tone & Sensation:  Tone: Normal  Sensation: Impaired    Balance:  Sitting: Intact  Standing: Impaired; With support(with rolling walker)  Standing - Static: Good    Functional Mobility and Transfers for ADLs:  Bed Mobility:  Supine to Sit: Contact guard assistance;Bed Modified;Assist x1;Additional time(heavy use of bedrails)  Sit to Supine: Stand-by assistance;Bed Modified    Transfers:  Sit to Stand: Contact guard assistance;Assist x1  Stand to Sit: Contact guard assistance;Assist x1    ADL Assessment:  Feeding: Setup    Oral Facial Hygiene/Grooming: Setup;Minimum assistance(infer based on observations)    Bathing: Moderate assistance(assist for lower body/perineal area and shoulders)    Upper Body Dressing: Minimum assistance    Lower Body Dressing:  Moderate assistance;Maximum assistance(decreased reach to distal LEs; assist to pull up over hips)    Toileting: Maximum assistance(for hygiene and clothing management)    ADL Intervention and task modifications:    Upper Body Bathing  Bathing Assistance: Minimum assistance(assist for shoulders and back; heavy cues for thoroughness)  Position Performed: Seated edge of bed  Cues: Physical assistance; Tactile cues provided;Verbal cues provided;Visual cues provided    Upper Body [address]: Minimum  assistance(assist to bring up over head)  Cues: Physical assistance; Tactile cues provided;Verbal cues provided    Lower Body Dressing Assistance  Protective Undergarmet: Maximum assistance  Leg Crossed Method Used: No  Position Performed: Seated edge of bed;Standing  Cues: Don;Physical assistance  Adaptive Equipment Used: Walker    Toileting  Bowel Hygiene: Maximum assistance(pt required B UE support at RW; limited by body habitus)  Clothing Management: Maximum assistance(for brief management)  Adaptive Equipment: Walker    Cognitive Retraining  Safety/Judgement: Decreased awareness of environment;Decreased awareness of need for assistance;Decreased awareness of need for safety     Functional Measure:  Barthel Index:    Bathin  Bladder: 0  Bowels: 0  Groomin  Dressin  Feeding: 10  Mobility: 0  Stairs: 0  Toilet Use: 0  Transfer (Bed to Chair and Back): 10  Total: 25/100        The Barthel ADL Index: Guidelines  1. The index should be used as a record of what a patient does, not as a record of what a patient could do. 2. The main aim is to establish degree of independence from any help, physical or verbal, however minor and for whatever reason. 3. The need for supervision renders the patient not independent. 4. A patient's performance should be established using the best available evidence. Asking the patient, friends/relatives and nurses are the usual sources, but direct observation and common sense are also important. However direct testing is not needed. 5. Usually the patient's performance over the preceding 24-48 hours is important, but occasionally longer periods will be relevant. 6. Middle categories imply that the patient supplies over 50 per cent of the effort. 7. Use of aids to be independent is allowed. Mildred Wayne., Barthel, D.W. (8273). Functional evaluation: the Barthel Index. [address] [facility] (14)2. NOELLE Pitt, Elige Heimlich., Andra Dye., [place], [address] (1999). Measuring the change indisability after inpatient rehabilitation; comparison of the responsiveness of the Barthel Index and Functional [place] Measure. Journal of Neurology, Neurosurgery, and Psychiatry, 66(4), 938-734. ARIE Gurrola, HAROON Benz, & Meredith Harris M.A. (2004.) Assessment of post-stroke quality of life in cost-effectiveness studies: The usefulness of the Barthel Index and the EuroQoL-5D. Quality of Life Research, 15, 343-39     Occupational Therapy Evaluation Charge Determination   History Examination Decision-Making   LOW Complexity : Brief history review  HIGH Complexity : 5 or more performance deficits relating to physical, cognitive , or psychosocial skils that result in activity limitations and / or participation restrictions HIGH Complexity : Patient presents with comorbidities that affect occupational performance. Signifigant modification of tasks or assistance (eg, physical or verbal) with assessment (s) is necessary to enable patient to complete evaluation       Based on the above components, the patient evaluation is determined to be of the following complexity level: LOW   Pain Rating:  Patient denied pain this session    Activity Tolerance:   Fair  Please refer to the flowsheet for vital signs taken during this treatment. After treatment patient left in no apparent distress:    Call bell within reach, Bed / chair alarm activated, Side rails x 3 and In bed with HOB elevated    COMMUNICATION/EDUCATION:   The patients plan of care was discussed with: Physical Therapist, Registered Nurse and . Patient/family have participated as able in goal setting and plan of care. This patients plan of care is appropriate for delegation to [facility].     Thank you for this referral.  Jerrica Contreras OT  Time Calculation: 25 mins

## 2019-12-10 NOTE — PROGRESS NOTES
[facility]                                                                                          Hospitalist Progress Note  Milena Bello MD  Answering service: [phone] OR [phone] from in house phone        Date of Service:  12/10/2019  NAME:  Yesi Newton  :  1950  MRN:  [id number]      Admission Summary:   80-year-old male with past medical history of venous stasis presented to [facility] with lower extremity edema.      Interval history / Subjective:     12/10/2019:  Chart reviewed, CM notes noted, Wounds eval,   Agree with current plans and will continue as after eval, agreement      Assessment & Plan:     Cellulitis of the left lower extremity: improving  Bilateral venous stasis:  -Transition cefazolin to oral keflex  -Continue Bumex 1 mg  -appreciate wound care  -lower extremity dopplers negative  -echo with normal EF  -Blood cultures negative  -gertrude stasis demermatitis complicates eval agree with keflex as scheduled will continue     Conjunctivitis, bacterial: improving  -initiated therapy   - cont with current as of 12/10/2019     DM, Type II with hyperglycemia:   -lantus 20 units, SSI  -continue oral medications  Lab Results   Component Value Date/Time    Glucose 152 (H) 2019 04:40 AM    Glucose (POC) 110 (H) 12/10/2019 06:49 AM    no changes planned today     Hyponatremia: resolved  Renal insufficiency: resolved  Lab Results   Component Value Date/Time    Creatinine 1.11 2019 04:40 AM      Impaired hearing: supportive care   Obesity Body mass index is 32.29 kg/m².      Code status: full   DVT prophylaxis: heparin     Care Plan discussed with: Patient/Family  Disposition: TBD awaiting placement     Hospital Problems  Date Reviewed: 2019          Codes Class Noted POA    * (Principal) Cellulitis of lower extremity ICD-10-CM: L03.119  ICD-9-CM: 682.6  2019 Unknown                Review of Systems:   Negative unless stated above       Vital Signs:    Last 24hrs VS reviewed since prior progress note. Most recent are:  Visit Vitals  /77   Pulse 81   Temp 98.6 °F (37 °C)   Resp 18   Ht 5' 11\" (1.803 m)   Wt 105 kg (231 lb 7.7 oz)   SpO2 92%   BMI 32.29 kg/m²       No intake or output data in the 24 hours ending 12/10/19 8242     Physical Examination:             Constitutional:  No acute distress, cooperative, pleasant, obese hard of hearing. ENT:  bilateral conjunctivitis with medial purulent drainage    Resp:  CTA bilaterally. No wheezing/rhonchi/rales. No accessory muscle use   CV:  Regular rhythm, normal rate, no murmurs, gallops, rubs    GI:  Soft, non distended, non tender    Musculoskeletal:  Erythema bilaterally, left greater than right, left leg erythema extends to lateral foot    Neurologic:  Moves all extremities    SKIN: gertrude stasis dermatitis and assoc superimposed cellulitis, mid lower legs. Data Review:    Review and/or order of clinical lab test      Labs:     No results for input(s): WBC, HGB, HCT, PLT, HGBEXT, HCTEXT, PLTEXT, HGBEXT, HCTEXT, PLTEXT in the last 72 hours. Recent Labs     12/08/19  0440      K 3.8   CL 99   CO2 31   BUN 17   CREA 1.11   *   CA 9.0     No results for input(s): SGOT, GPT, ALT, AP, TBIL, TBILI, TP, ALB, GLOB, GGT, AML, LPSE in the last 72 hours. No lab exists for component: AMYP, HLPSE  No results for input(s): INR, PTP, APTT, INREXT, INREXT in the last 72 hours. No results for input(s): FE, TIBC, PSAT, FERR in the last 72 hours. No results found for: FOL, RBCF   No results for input(s): PH, PCO2, PO2 in the last 72 hours. No results for input(s): CPK, CKNDX, TROIQ in the last 72 hours.     No lab exists for component: CPKMB  No results found for: CHOL, CHOLX, CHLST, CHOLV, HDL, HDLP, LDL, LDLC, DLDLP, TGLX, TRIGL, TRIGP, CHHD, CHHDX  Lab Results   Component Value Date/Time    Glucose (POC) 110 (H) 12/10/2019 06:49 AM    Glucose (POC) 225 (H) 12/09/2019 09:55 PM    Glucose (POC) 258 (H) 12/09/2019 04:27 PM    Glucose (POC) 215 (H) 12/09/2019 11:20 AM    Glucose (POC) 147 (H) 12/09/2019 06:12 AM     No results found for: COLOR, APPRN, SPGRU, REFSG, HARRIET, PROTU, GLUCU, KETU, BILU, UROU, LEVI, LEUKU, GLUKE, EPSU, BACTU, WBCU, RBCU, CASTS, UCRY      Medications Reviewed:     Current Facility-Administered Medications   Medication Dose Route Frequency    cephALEXin (KEFLEX) capsule 500 mg  500 mg Oral QID    glipiZIDE (GLUCOTROL) tablet 5 mg  5 mg Oral ACB    metFORMIN (GLUCOPHAGE) tablet 1,000 mg  1,000 mg Oral BID WITH MEALS    bumetanide (BUMEX) tablet 1 mg  1 mg Oral DAILY    trimethoprim-polymyxin b (POLYTRIM) 10,000 unit- 1 mg/mL ophthalmic solution 1 Drop  1 Drop Both Eyes BID    sodium chloride (NS) flush 5-40 mL  5-40 mL IntraVENous Q8H    sodium chloride (NS) flush 5-40 mL  5-40 mL IntraVENous PRN    heparin (porcine) injection 5,000 Units  5,000 Units SubCUTAneous Q8H    insulin lispro (HUMALOG) injection   SubCUTAneous AC&HS    glucose chewable tablet 16 g  4 Tab Oral PRN    glucagon (GLUCAGEN) injection 1 mg  1 mg IntraMUSCular PRN    insulin glargine (LANTUS) injection 20 Units  20 Units SubCUTAneous QHS    magnesium oxide (MAG-OX) tablet 400 mg  400 mg Oral DAILY    melatonin tablet 3 mg  3 mg Oral QHS PRN    sertraline (ZOLOFT) tablet 25 mg  25 mg Oral QHS    loperamide (IMODIUM) capsule 2 mg  2 mg Oral Q6H PRN    docusate sodium (COLACE) capsule 100 mg  100 mg Oral DAILY    clotrimazole-betamethasone (LOTRISONE) 1-0.05 % cream   Topical BID     ______________________________________________________________________  EXPECTED LENGTH OF STAY: - - -  ACTUAL LENGTH OF STAY:          6                 Regis Iniguez MD

## 2019-12-10 NOTE — PROGRESS NOTES
Problem: Falls - Risk of  Goal: *Absence of Falls  Description  Document Aneudy Mxi Fall Risk and appropriate interventions in the flowsheet.   Outcome: Progressing Towards Goal  Note: Fall Risk Interventions:  Mobility Interventions: Bed/chair exit alarm, Communicate number of staff needed for ambulation/transfer    Mentation Interventions: Bed/chair exit alarm, Door open when patient unattended, Room close to nurse's station    Medication Interventions: Bed/chair exit alarm, Evaluate medications/consider consulting pharmacy    Elimination Interventions: Bed/chair exit alarm, Call light in reach

## 2019-12-10 NOTE — PROGRESS NOTES
Occupational Therapy:  12/10/19    Orders received and appreciated, chart reviewed, spoke to RN who clears patient for therapy. Patient seen and evaluated by Occupational Therapy. Recommend patient return to shelter vs. SNF. Patient is poor historian so unsure of PLOF or level of assist beyond what is provided in chart. Full note to follow.     Thank you,  Estre Tipton, OTR/L

## 2019-12-10 NOTE — PROGRESS NOTES
Bedside and Verbal shift change report given to Nikolas Matamoros (oncoming nurse) by Paty Moreira RN (offgoing nurse). Report included the following information SBAR, Kardex, Intake/Output, MAR and Accordion.

## 2019-12-10 NOTE — PROGRESS NOTES
TRANSFER - OUT REPORT:    Verbal report given to Janet Perez RN(name) on Betsy Quijano  being transferred to [facility](unit) for routine progression of care       Report consisted of patients Situation, Background, Assessment and   Recommendations(SBAR). Information from the following report(s) SBAR, Kardex, Intake/Output, MAR, Accordion and Recent Results was reviewed with the receiving nurse. Lines:       Opportunity for questions and clarification was provided.       Patient transported with:   Registered Nurse

## 2019-12-11 VITALS
RESPIRATION RATE: 16 BRPM | WEIGHT: 227.1 LBS | OXYGEN SATURATION: 95 % | DIASTOLIC BLOOD PRESSURE: 84 MMHG | SYSTOLIC BLOOD PRESSURE: 180 MMHG | BODY MASS INDEX: 31.79 KG/M2 | TEMPERATURE: 98.1 F | HEART RATE: 92 BPM | HEIGHT: 71 IN

## 2019-12-11 PROBLEM — L03.119 CELLULITIS OF LOWER EXTREMITY: Status: RESOLVED | Noted: 2019-12-04 | Resolved: 2019-12-11

## 2019-12-11 LAB
GLUCOSE BLD STRIP.AUTO-MCNC: 105 MG/DL (ref 65–100)
GLUCOSE BLD STRIP.AUTO-MCNC: 155 MG/DL (ref 65–100)
SERVICE CMNT-IMP: ABNORMAL
SERVICE CMNT-IMP: ABNORMAL

## 2019-12-11 PROCEDURE — 74011250637 HC RX REV CODE- 250/637: Performed by: INTERNAL MEDICINE

## 2019-12-11 PROCEDURE — 97530 THERAPEUTIC ACTIVITIES: CPT

## 2019-12-11 PROCEDURE — 74011636637 HC RX REV CODE- 636/637: Performed by: HOSPITALIST

## 2019-12-11 PROCEDURE — 74011250637 HC RX REV CODE- 250/637: Performed by: HOSPITALIST

## 2019-12-11 PROCEDURE — 82962 GLUCOSE BLOOD TEST: CPT

## 2019-12-11 PROCEDURE — 74011250636 HC RX REV CODE- 250/636: Performed by: HOSPITALIST

## 2019-12-11 RX ORDER — PROMETHAZINE HYDROCHLORIDE 25 MG/1
25 TABLET ORAL
Qty: 20 TAB | Refills: 0 | Status: SHIPPED | OUTPATIENT
Start: 2019-12-11 | End: 2020-01-06

## 2019-12-11 RX ORDER — CEPHALEXIN 500 MG/1
500 CAPSULE ORAL 4 TIMES DAILY
Qty: 12 CAP | Refills: 0 | Status: SHIPPED | OUTPATIENT
Start: 2019-12-11 | End: 2020-01-06

## 2019-12-11 RX ORDER — GLIPIZIDE 5 MG/1
5 TABLET ORAL
Qty: 30 TAB | Refills: 0 | Status: SHIPPED | OUTPATIENT
Start: 2019-12-11 | End: 2020-01-06

## 2019-12-11 RX ADMIN — MAGNESIUM OXIDE TAB 400 MG (241.3 MG ELEMENTAL MG) 400 MG: 400 (241.3 MG) TAB at 11:01

## 2019-12-11 RX ADMIN — MELATONIN 3 MG: at 00:42

## 2019-12-11 RX ADMIN — CEPHALEXIN 500 MG: 250 CAPSULE ORAL at 11:02

## 2019-12-11 RX ADMIN — POLYMYXIN B SULFATE, TRIMETHOPRIM SULFATE 1 DROP: 10000; 1 SOLUTION/ DROPS OPHTHALMIC at 11:00

## 2019-12-11 RX ADMIN — HEPARIN SODIUM 5000 UNITS: 5000 INJECTION INTRAVENOUS; SUBCUTANEOUS at 07:18

## 2019-12-11 RX ADMIN — CLOTRIMAZOLE AND BETAMETHASONE DIPROPIONATE: 10; .5 CREAM TOPICAL at 11:00

## 2019-12-11 RX ADMIN — Medication 10 ML: at 06:00

## 2019-12-11 RX ADMIN — METFORMIN HYDROCHLORIDE 1000 MG: 500 TABLET ORAL at 07:18

## 2019-12-11 RX ADMIN — DOCUSATE SODIUM 100 MG: 100 CAPSULE, LIQUID FILLED ORAL at 11:01

## 2019-12-11 RX ADMIN — GLIPIZIDE 5 MG: 5 TABLET ORAL at 07:18

## 2019-12-11 RX ADMIN — BUMETANIDE 1 MG: 1 TABLET ORAL at 11:01

## 2019-12-11 RX ADMIN — INSULIN LISPRO 2 UNITS: 100 INJECTION, SOLUTION INTRAVENOUS; SUBCUTANEOUS at 11:36

## 2019-12-11 NOTE — PROGRESS NOTES
CM offered screening for Medicaid Long-Term [employer]. Facility request.     [facility] Screening submitted for processing.      Ambreen Pandey, [address]   [phone]

## 2019-12-11 NOTE — PROGRESS NOTES
JONELLE:    Discharge to [facility] today. [facility] phone [phone] pickup at 3pm.    RN notified.   Call report to [address] [place] [address] Blvd, RN/CRM

## 2019-12-11 NOTE — DISCHARGE INSTRUCTIONS
Discharge Instructions       PATIENT ID: Compa Contreras  MRN: [id number]   YOB: 1950    DATE OF ADMISSION: 12/4/2019 11:14 AM    DATE OF DISCHARGE: 12/11/2019    PRIMARY CARE PROVIDER: Unknown, Provider     ATTENDING PHYSICIAN: Travis Santiago MD  DISCHARGING PROVIDER: Crissy Marie MD    To contact this individual call [phone] and ask the  to page. If unavailable ask to be transferred the Adult Hospitalist Department. DISCHARGE DIAGNOSES     Lower extremity cellulitis    Conjunctivitis       CONSULTATIONS: None    PROCEDURES/SURGERIES: * No surgery found *    PENDING TEST RESULTS:   At the time of discharge the following test results are still pending: none    FOLLOW UP APPOINTMENTS:   Follow-up Information     Follow up With Specialties Details Why Contact Info    Unknown, Provider   see PCP of record one week  Patient not available to ask             ADDITIONAL CARE RECOMMENDATIONS:     New medications:     1. Keflex for cellulitis. Please have physician evaluate to see if antibiotics need to continue     2. Eye drops for conjunctivitis. Please be evaluated by physician this week to monitor improvement. 3. Change lasix to bumex. Take in the morning. WOUND CARE:     Wound Care:  Left lower leg, foot and toes- Every other day and as needed for breakthrough drainage, cleanse with normal saline, wipe wound bed clean and dry, apply Xeroform gauze that has been folded in half, cover with 4 x 4's and secure with roll gauze and tape.     Mariel-anal skin, bilateral groin, scrotum and pannus- Every 12 hours gently cleanse with soap and water, blot dry, apply zinc oxide.     Right lower leg- Every 12 hours moisturize. EQUIPMENT needed: ***      DISCHARGE MEDICATIONS:   See Medication Reconciliation Form    · It is important that you take the medication exactly as they are prescribed.    · Keep your medication in the bottles provided by the pharmacist and keep a list of the medication names, dosages, and times to be taken in your wallet. · Do not take other medications without consulting your doctor. NOTIFY YOUR PHYSICIAN FOR ANY OF THE FOLLOWING:   Fever over 101 degrees for 24 hours. Chest pain, shortness of breath, fever, chills, nausea, vomiting, diarrhea, change in mentation, falling, weakness, bleeding. Severe pain or pain not relieved by medications. Or, any other signs or symptoms that you may have questions about.           Signed:   Jessica Roa MD  12/11/2019  1:48 PM

## 2019-12-11 NOTE — PROGRESS NOTES
Problem: Diabetes Self-Management  Goal: *Incorporating nutritional management into lifestyle  Description  Describe effect of type, amount and timing of food on blood glucose; list 3 methods for planning meals. Outcome: Progressing Towards Goal    Patient will understand food and effect on blood sugar.

## 2019-12-11 NOTE — PROGRESS NOTES
Bedside shift change report given to [facility] (oncoming nurse) by Dar Mckeon RN (offgoing nurse). Report included the following information SBAR, Kardex, Intake/Output and MAR.

## 2019-12-11 NOTE — DISCHARGE SUMMARY
Discharge Summary       PATIENT ID: Emerita Jacobo  MRN: [id number]   YOB: 1950    DATE OF ADMISSION: 12/4/2019 11:14 AM    DATE OF DISCHARGE: 12/11/2019    PRIMARY CARE PROVIDER: Unknown, Provider     ATTENDING PHYSICIAN: Froy Lockhart MD   DISCHARGING PROVIDER: Froy Lockhart MD    To contact this individual call [phone] and ask the  to page. If unavailable ask to be transferred the Adult Hospitalist Department.     CONSULTATIONS: None    PROCEDURES/SURGERIES: * No surgery found *    [address] COURSE:     Admission Summary:   22-year-old male with past medical history of venous stasis presented to [facility] with lower extremity edema.      Interval history / Subjective:      12/11/2019 some n/v today, but has soft abd, nl bs and having nl bowel movement, pt tends to eat fast and has cough, felt to result in his n/v       Assessment & Plan:      Cellulitis of the left lower extremity: improving  Bilateral venous stasis:  -Transition cefazolin to oral keflex  -Continue Bumex 1 mg  -appreciate wound care  -lower extremity dopplers negative  -echo with normal EF  -Blood cultures negative  -gertrude stasis demermatitis complicates eval agree with keflex as scheduled will continue      Conjunctivitis, bacterial: improving  -initiated therapy 12/6  - cont with current as of 12/10/2019      DM, Type II with hyperglycemia:   -lantus 20 units, SSI  -continue oral medications        Lab Results   Component Value Date/Time     Glucose 152 (H) 12/08/2019 04:40 AM     Glucose (POC) 110 (H) 12/10/2019 06:49 AM    no changes planned today      Hyponatremia: resolved  Renal insufficiency: resolved        Lab Results   Component Value Date/Time     Creatinine 1.11 12/08/2019 04:40 AM      Impaired hearing: supportive care   Obesity Body mass index is 32.29 kg/m².      Code status: full   DVT prophylaxis: heparin      Care Plan discussed with: Patient/Family  Disposition: TBD awaiting placement                  Hospital Problems  Date Reviewed: 12/4/2019           Codes Class Noted POA     * (Principal) Cellulitis of lower extremity ICD-10-CM: L03.119  ICD-9-CM: 682. 6   12/4/2019 Unknown                           DISCHARGE DIAGNOSES / PLAN:      1.  as above      ADDITIONAL CARE RECOMMENDATIONS:   Allow phenergan for n/v   See a   PCP in one week     PENDING TEST RESULTS:   At the time of discharge the following test results are still pending: na    FOLLOW UP APPOINTMENTS:    Follow-up Information     Follow up With Specialties Details Why Contact Info    Unknown, Provider   see PCP of record one week  Patient not available to ask             ADDITIONAL CARE RECOMMENDATIONS:     New medications:     1. Keflex for cellulitis. Please have physician evaluate to see if antibiotics need to continue     2. Eye drops for conjunctivitis. Please be evaluated by physician this week to monitor improvement. 3. Change lasix to bumex. Take in the morning. WOUND CARE:     Wound Care:  Left lower leg, foot and toes- Every other day and as needed for breakthrough drainage, cleanse with normal saline, wipe wound bed clean and dry, apply Xeroform gauze that has been folded in half, cover with 4 x 4's and secure with roll gauze and tape.     Mariel-anal skin, bilateral groin, scrotum and pannus- Every 12 hours gently cleanse with soap and water, blot dry, apply zinc oxide.     Right lower leg- Every 12 hours moisturize. EQUIPMENT needed: na       DISCHARGE MEDICATIONS:   See Medication Reconciliation Form    · It is important that you take the medication exactly as they are prescribed. · Keep your medication in the bottles provided by the pharmacist and keep a list of the medication names, dosages, and times to be taken in your wallet. · Do not take other medications without consulting your doctor. NOTIFY YOUR PHYSICIAN FOR ANY OF THE FOLLOWING:   Fever over 101 degrees for 24 hours. Chest pain, shortness of breath, fever, chills, nausea, vomiting, diarrhea, change in mentation, falling, weakness, bleeding. Severe pain or pain not relieved by medications. Or, any other signs or symptoms that you may have questions about. Signed:   Arleen Bennett MD  12/11/2019  1:48 PM    DISCHARGE MEDICATIONS:  Current Discharge Medication List      START taking these medications    Details   cephALEXin (KEFLEX) 500 mg capsule Take 1 Cap by mouth four (4) times daily. Qty: 12 Cap, Refills: 0      promethazine (PHENERGAN) 25 mg tablet Take 1 Tab by mouth every six (6) hours as needed for Nausea. Qty: 20 Tab, Refills: 0      bumetanide (BUMEX) 1 mg tablet Take 1 Tab by mouth daily. Qty: 30 Tab, Refills: 0      trimethoprim-polymyxin b (POLYTRIM) ophthalmic solution Administer 1 Drop to both eyes four (4) times daily for 5 days. Qty: 10 mL, Refills: 0         CONTINUE these medications which have CHANGED    Details   glipiZIDE (GLUCOTROL) 5 mg tablet Take 1 Tab by mouth Daily (before breakfast). Qty: 30 Tab, Refills: 0         CONTINUE these medications which have NOT CHANGED    Details   insulin glargine (LANTUS,BASAGLAR) 100 unit/mL (3 mL) inpn 10 Units by SubCUTAneous route every evening. docusate sodium (COLACE) 100 mg capsule Take 100 mg by mouth daily. lisinopril (PRINIVIL, ZESTRIL) 20 mg tablet Take 20 mg by mouth daily. magnesium oxide (MAG-OX) 400 mg tablet Take 400 mg by mouth daily. melatonin 5 mg cap capsule Take 5 mg by mouth nightly. metFORMIN (GLUCOPHAGE) 1,000 mg tablet Take 1,000 mg by mouth two (2) times daily (with meals). metoprolol succinate (TOPROL-XL) 100 mg tablet Take 100 mg by mouth daily. insulin aspart U-100 (NOVOLOG U-100 INSULIN ASPART) 100 unit/mL injection by SubCUTAneous route.  Sliding scale:  -250 = 4 units  -300 = 6 units  -350 = 8 units  BS > 351 = 10 units      sertraline (ZOLOFT) 25 mg tablet Take 25 mg by mouth nightly. loperamide (IMODIUM) 2 mg capsule Take 2 mg by mouth every six (6) hours as needed for Diarrhea. clotrimazole-betamethasone (LOTRISONE) topical cream Apply  to affected area two (2) times a day. Apply to groin/scrotum         STOP taking these medications       furosemide (LASIX) 40 mg tablet Comments:   Reason for Stopping:                 NOTIFY YOUR PHYSICIAN FOR ANY OF THE FOLLOWING:   Fever over 101 degrees for 24 hours. Chest pain, shortness of breath, fever, chills, nausea, vomiting, diarrhea, change in mentation, falling, weakness, bleeding. Severe pain or pain not relieved by medications. Or, any other signs or symptoms that you may have questions about. DISPOSITION:    Home With:   OT  PT  HH  RN       Long term SNF/Inpatient Rehab   x Independent/assisted living    Hospice    Other:       PATIENT CONDITION AT DISCHARGE:     Functional status    Poor    x Deconditioned     Independent      Cognition     Lucid    x Forgetful     Dementia      Catheters/lines (plus indication)    Gann     PICC     PEG    x None      Code status   x  Full code     DNR      PHYSICAL EXAMINATION AT DISCHARGE:  General:          Alert, cooperative, no distress, appears stated age. HEENT:           Atraumatic, anicteric sclerae, gertrude conjunctivitis, improved                           No oral ulcers, mucosa moist, throat clear, dentition fair  Neck:               Supple, symmetrical  Lungs:             Clear to auscultation bilaterally. No Wheezing or Rhonchi. No rales. Chest wall:      No tenderness  No Accessory muscle use. Heart:              Regular  rhythm,  No  murmur   No edema  Abdomen:        Soft, non-tender. Not distended. Bowel sounds normal  Extremities:     No cyanosis. No clubbing,                            Skin: gertrude stasis dermatitis. Skin:                Not pale. Not Jaundiced  No rashes   Psych:             not  anxious or agitated.   Neurologic:      Alert, moves all extremities, answers questions appropriately and responds to commands       CHRONIC MEDICAL DIAGNOSES:  Problem List as of 12/11/2019 Date Reviewed: 12/11/2019          Codes Class Noted - Resolved    * (Principal) RESOLVED: Cellulitis of lower extremity ICD-10-CM: L03.119  ICD-9-CM: 682.6  12/4/2019 - 12/11/2019              Greater than  30  minutes were spent with the patient on counseling and coordination of care    Signed:   Genet Mcgovern MD  12/11/2019  12:43 PM

## 2019-12-11 NOTE — PROGRESS NOTES
Problem: Mobility Impaired (Adult and Pediatric)  Goal: *Acute Goals and Plan of Care (Insert Text)  Description  FUNCTIONAL STATUS PRIOR TO ADMISSION: Patient was modified independent using a rolling walker and wheelchair for functional mobility. HOME SUPPORT PRIOR TO ADMISSION: The patient lived with support in an adult home, primarily wheelchair dependent per notes, but he also reports he has a walker. Physical Therapy Goals  Initiated 12/9/2019  1. Patient will move from supine to sit and sit to supine  and roll side to side in bed with supervision/set-up within 7 day(s). 2.  Patient will transfer from bed to chair and chair to bed with supervision/set-up using the least restrictive device within 7 day(s). 3.  Patient will perform sit to stand with supervision/set-up within 7 day(s). 4.  Patient will ambulate with supervision/set-up for 50 feet with the least restrictive device within 7 day(s). Outcome: Progressing Towards Goal    PHYSICAL THERAPY TREATMENT  Patient: Ashley Pierson (7 y.o. male)  Date: 12/11/2019  Diagnosis: Cellulitis of lower extremity [L03.119]   Cellulitis of lower extremity       Precautions: Contact, Fall  Chart, physical therapy assessment, plan of care and goals were reviewed. ASSESSMENT  Patient continues with skilled PT services and is progressing towards goals. Pt requires encouragement for out of bed but pleasant once situation explained in writing due to significant [place]. Pt participates in supine to sit to stand, transfer to chair and requires increased safety cues. Pt noted to have significant dead skin on BLE where cellulitis is and skin peeling back, washed legs gently with washcloth and applied lotion. Large amount of skin flakes noted in bed where patient was moving and skin tearing off. Pt left in chair eating with call bell, will continue to follow.     Current Level of Function Impacting Discharge (mobility/balance): min A-CGA         PLAN :  Patient continues to benefit from skilled intervention to address the above impairments. Continue treatment per established plan of care. to address goals. Recommendation for discharge: (in order for the patient to meet his/her long term goals)  To be determined: recommend return to assisted living if they are able to manage his care, but otherwise SNF level rehab to increase his activity tolerance     This discharge recommendation:  Has been made in collaboration with the attending provider and/or case management    IF patient discharges home will need the following DME: patient owns DME required for discharge       SUBJECTIVE:   Patient stated what? Yeah we can do that.     OBJECTIVE DATA SUMMARY:   Critical Behavior:  Neurologic State: Alert, Confused  Orientation Level: Oriented to person, Disoriented to place, Disoriented to situation, Disoriented to time  Cognition: Follows commands  Safety/Judgement: Decreased awareness of environment, Decreased awareness of need for assistance, Decreased awareness of need for safety  Functional Mobility Training:  Bed Mobility:     Supine to Sit: Contact guard assistance              Transfers:  Sit to Stand: Contact guard assistance; Adaptive equipment  Stand to Sit: Contact guard assistance; Adaptive equipment                             Balance:  Sitting: Intact  Standing: Impaired; With support  Standing - Static: Fair;Constant support  Standing - Dynamic : Fair;Constant support  Ambulation/Gait Training:  Distance (ft): 4 Feet (ft)  Assistive Device: Gait belt;Walker, rolling  Ambulation - Level of Assistance: Minimal assistance; Adaptive equipment; Additional time        Gait Abnormalities: Decreased step clearance;Shuffling gait;Trunk sway increased        Base of Support: Widened     Speed/Caitlin: Slow;Shuffled  Step Length: Right shortened;Left shortened        Interventions: Safety awareness training; Tactile cues; Verbal cues; Visual/Demos        Pain Rating:  Denies pain    Activity Tolerance:   Fair and requires rest breaks  Please refer to the flowsheet for vital signs taken during this treatment.     After treatment patient left in no apparent distress:   Sitting in chair and Call bell within reach    COMMUNICATION/COLLABORATION:   The patients plan of care was discussed with: Registered Nurse and     Edi Galicia, PT   Time Calculation: 38 mins

## 2019-12-11 NOTE — PROGRESS NOTES
Transition of Care Plan to SNF/Rehab    SNF/Rehab Transition:  Patient has been accepted to [address] [place] [address] and meets criteria for admission. Patient will transported by [facility] and expected to leave at 3pm.    Communication to Patient/Family:  Met with patient and son, Alee Vernon and they are agreeable to the transition plan. Communication to SNF/Rehab:  Bedside RN, Marleen Jin, has been notified to update the transition plan to the facility and call report (phone [phone]). Discharge information has been updated on the AVS.     Discharge instructions to be fax'd to facility at [facility] # [phone]). SNF/Rehab Transition:  Patient to follow-up with Home Health: [facility], other ,none)  PCP/Specialist:  Ambulatory Care Management:     Reviewed and confirmed with facility,Lelia they can manage the patient care needs for the following:     Gt Goss with (X) only those applicable:    Medication:  []  Medications will be available at the facility  []  IV Antibiotics  []  Controlled Substance - hard copy to be sent with patient   []  Weekly Labs   Documents:  [x] Hard RX  [x] MAR  [x] Kardex  [x] AVS  [x]Transfer Summary  [x]Discharge   Equipment:  []  CPAP/BiPAP  []  Wound Vacuum  []  Gann or Urinary Device  []  PICC/Central Line  []  Nebulizer  []  Ventilator   Treatment:  []Isolation (for MRSA, VRE, etc.)  []Surgical Drain Management  []Tracheostomy Care  []Dressing Changes  []Dialysis with transportation and chair time   []PEG Care  []Oxygen  []Daily Weights for Heart Failure   Dietary:  []Any diet limitations  []Tube Feedings   []Total Parenteral Management (TPN)   Eligible for Medicaid Long Term Services and Supports  Yes:  [] Eligible for medical assistance or will become eligible within 180 days and UAI completed. [x] Provider/Patient and/or support system has requested screening.   [] UAI copy provided to patient or responsible party,   [] UAI unavailable at discharge will send once processed to SNF provider. [x] UAI unavailable at discharged mailed to patient  No:   [] Private pay and is not financially eligible for Medicaid within the next 180 days. [] Reside out-of-state.   [] A residents of a state owned/operated facility that is licensed  by [address] [place] [facility] or [facility]  [] Enrollment in [facility] hospice services  [] [address] [facility]  [] Patient /Family declines to have screening completed or provide financial information for screening     Financial Resources:  Medicaid    [] Initiated and application pending   [] Full coverage     Advanced Care Plan:  []Surrogate Decision Maker of Care  []POA  [x]Communicated Code StatusFull    Other

## 2019-12-11 NOTE — PROGRESS NOTES
CM verified patient has a qualifying hospital stay for [facility]. .    S.  [employer] [facility]

## 2019-12-12 NOTE — PROGRESS NOTES
LTSS screening successfully processed and sent to [employer] and [employer] via [address].      Lisa Brown, [address] [place] [address]   [phone]

## 2020-01-06 ENCOUNTER — HOSPITAL ENCOUNTER (INPATIENT)
Age: 70
LOS: 7 days | Discharge: NURSING, REHAB OR LONG-TERM CARE | DRG: 638 | End: 2020-01-13
Attending: STUDENT IN AN ORGANIZED HEALTH CARE EDUCATION/TRAINING PROGRAM | Admitting: INTERNAL MEDICINE
Payer: MEDICARE

## 2020-01-06 DIAGNOSIS — L08.9 DIABETIC FOOT INFECTION (HCC): Primary | ICD-10-CM

## 2020-01-06 DIAGNOSIS — E11.628 DIABETIC FOOT INFECTION (HCC): Primary | ICD-10-CM

## 2020-01-06 DIAGNOSIS — R73.9 HYPERGLYCEMIA: ICD-10-CM

## 2020-01-06 LAB
ALBUMIN SERPL-MCNC: 2.8 G/DL (ref 3.5–5)
ALBUMIN/GLOB SERPL: 0.6 {RATIO} (ref 1.1–2.2)
ALP SERPL-CCNC: 98 U/L (ref 45–117)
ALT SERPL-CCNC: 25 U/L (ref 12–78)
ANION GAP SERPL CALC-SCNC: 7 MMOL/L (ref 5–15)
AST SERPL-CCNC: 19 U/L (ref 15–37)
BASOPHILS # BLD: 0 K/UL (ref 0–0.1)
BASOPHILS NFR BLD: 0 % (ref 0–1)
BILIRUB SERPL-MCNC: 0.2 MG/DL (ref 0.2–1)
BUN SERPL-MCNC: 17 MG/DL (ref 6–20)
BUN/CREAT SERPL: 13 (ref 12–20)
CALCIUM SERPL-MCNC: 8 MG/DL (ref 8.5–10.1)
CHLORIDE SERPL-SCNC: 93 MMOL/L (ref 97–108)
CO2 SERPL-SCNC: 30 MMOL/L (ref 21–32)
COMMENT, HOLDF: NORMAL
CREAT SERPL-MCNC: 1.26 MG/DL (ref 0.7–1.3)
DIFFERENTIAL METHOD BLD: ABNORMAL
EOSINOPHIL # BLD: 0.1 K/UL (ref 0–0.4)
EOSINOPHIL NFR BLD: 1 % (ref 0–7)
ERYTHROCYTE [DISTWIDTH] IN BLOOD BY AUTOMATED COUNT: 13.4 % (ref 11.5–14.5)
GLOBULIN SER CALC-MCNC: 4.6 G/DL (ref 2–4)
GLUCOSE SERPL-MCNC: 365 MG/DL (ref 65–100)
HCT VFR BLD AUTO: 35.1 % (ref 36.6–50.3)
HGB BLD-MCNC: 10.8 G/DL (ref 12.1–17)
IMM GRANULOCYTES # BLD AUTO: 0.1 K/UL (ref 0–0.04)
IMM GRANULOCYTES NFR BLD AUTO: 1 % (ref 0–0.5)
LACTATE BLD-SCNC: 2.41 MMOL/L (ref 0.4–2)
LYMPHOCYTES # BLD: 1.2 K/UL (ref 0.8–3.5)
LYMPHOCYTES NFR BLD: 11 % (ref 12–49)
MCH RBC QN AUTO: 29.8 PG (ref 26–34)
MCHC RBC AUTO-ENTMCNC: 30.8 G/DL (ref 30–36.5)
MCV RBC AUTO: 96.7 FL (ref 80–99)
MONOCYTES # BLD: 0.5 K/UL (ref 0–1)
MONOCYTES NFR BLD: 4 % (ref 5–13)
NEUTS SEG # BLD: 8.7 K/UL (ref 1.8–8)
NEUTS SEG NFR BLD: 83 % (ref 32–75)
NRBC # BLD: 0 K/UL (ref 0–0.01)
NRBC BLD-RTO: 0 PER 100 WBC
PLATELET # BLD AUTO: 321 K/UL (ref 150–400)
PMV BLD AUTO: 8.9 FL (ref 8.9–12.9)
POTASSIUM SERPL-SCNC: 4 MMOL/L (ref 3.5–5.1)
PROT SERPL-MCNC: 7.4 G/DL (ref 6.4–8.2)
RBC # BLD AUTO: 3.63 M/UL (ref 4.1–5.7)
SAMPLES BEING HELD,HOLD: NORMAL
SODIUM SERPL-SCNC: 130 MMOL/L (ref 136–145)
WBC # BLD AUTO: 10.6 K/UL (ref 4.1–11.1)

## 2020-01-06 PROCEDURE — 80053 COMPREHEN METABOLIC PANEL: CPT

## 2020-01-06 PROCEDURE — 36415 COLL VENOUS BLD VENIPUNCTURE: CPT

## 2020-01-06 PROCEDURE — 65270000029 HC RM PRIVATE

## 2020-01-06 PROCEDURE — 87040 BLOOD CULTURE FOR BACTERIA: CPT

## 2020-01-06 PROCEDURE — 74011000258 HC RX REV CODE- 258: Performed by: STUDENT IN AN ORGANIZED HEALTH CARE EDUCATION/TRAINING PROGRAM

## 2020-01-06 PROCEDURE — 83605 ASSAY OF LACTIC ACID: CPT

## 2020-01-06 PROCEDURE — 85025 COMPLETE CBC W/AUTO DIFF WBC: CPT

## 2020-01-06 PROCEDURE — 74011250636 HC RX REV CODE- 250/636: Performed by: STUDENT IN AN ORGANIZED HEALTH CARE EDUCATION/TRAINING PROGRAM

## 2020-01-06 PROCEDURE — 99284 EMERGENCY DEPT VISIT MOD MDM: CPT

## 2020-01-06 PROCEDURE — 96374 THER/PROPH/DIAG INJ IV PUSH: CPT

## 2020-01-06 RX ORDER — DOCUSATE SODIUM 100 MG/1
100 CAPSULE, LIQUID FILLED ORAL DAILY
Status: DISCONTINUED | OUTPATIENT
Start: 2020-01-07 | End: 2020-01-13 | Stop reason: HOSPADM

## 2020-01-06 RX ORDER — HEPARIN SODIUM 5000 [USP'U]/ML
5000 INJECTION, SOLUTION INTRAVENOUS; SUBCUTANEOUS EVERY 8 HOURS
Status: DISCONTINUED | OUTPATIENT
Start: 2020-01-06 | End: 2020-01-13 | Stop reason: HOSPADM

## 2020-01-06 RX ORDER — LOPERAMIDE HYDROCHLORIDE 2 MG/1
2 CAPSULE ORAL
Status: DISCONTINUED | OUTPATIENT
Start: 2020-01-06 | End: 2020-01-13 | Stop reason: HOSPADM

## 2020-01-06 RX ORDER — SERTRALINE HYDROCHLORIDE 50 MG/1
25 TABLET, FILM COATED ORAL
Status: DISCONTINUED | OUTPATIENT
Start: 2020-01-06 | End: 2020-01-13 | Stop reason: HOSPADM

## 2020-01-06 RX ORDER — LANOLIN ALCOHOL/MO/W.PET/CERES
400 CREAM (GRAM) TOPICAL DAILY
Status: DISCONTINUED | OUTPATIENT
Start: 2020-01-07 | End: 2020-01-13 | Stop reason: HOSPADM

## 2020-01-06 RX ORDER — SODIUM CHLORIDE 0.9 % (FLUSH) 0.9 %
5-40 SYRINGE (ML) INJECTION AS NEEDED
Status: DISCONTINUED | OUTPATIENT
Start: 2020-01-06 | End: 2020-01-13 | Stop reason: HOSPADM

## 2020-01-06 RX ORDER — SODIUM CHLORIDE 0.9 % (FLUSH) 0.9 %
5-40 SYRINGE (ML) INJECTION EVERY 8 HOURS
Status: DISCONTINUED | OUTPATIENT
Start: 2020-01-06 | End: 2020-01-13 | Stop reason: HOSPADM

## 2020-01-06 RX ORDER — VANCOMYCIN/0.9 % SOD CHLORIDE 1.5G/250ML
1500 PLASTIC BAG, INJECTION (ML) INTRAVENOUS
Status: DISCONTINUED | OUTPATIENT
Start: 2020-01-07 | End: 2020-01-07 | Stop reason: DRUGHIGH

## 2020-01-06 RX ORDER — ONDANSETRON 2 MG/ML
4 INJECTION INTRAMUSCULAR; INTRAVENOUS
Status: DISCONTINUED | OUTPATIENT
Start: 2020-01-06 | End: 2020-01-13 | Stop reason: HOSPADM

## 2020-01-06 RX ORDER — METOPROLOL SUCCINATE 50 MG/1
100 TABLET, EXTENDED RELEASE ORAL DAILY
Status: DISCONTINUED | OUTPATIENT
Start: 2020-01-07 | End: 2020-01-13 | Stop reason: HOSPADM

## 2020-01-06 RX ORDER — LANOLIN ALCOHOL/MO/W.PET/CERES
4.5 CREAM (GRAM) TOPICAL
Status: DISCONTINUED | OUTPATIENT
Start: 2020-01-07 | End: 2020-01-13 | Stop reason: HOSPADM

## 2020-01-06 RX ORDER — VANCOMYCIN 2 GRAM/500 ML IN 0.9 % SODIUM CHLORIDE INTRAVENOUS
2000 ONCE
Status: COMPLETED | OUTPATIENT
Start: 2020-01-06 | End: 2020-01-06

## 2020-01-06 RX ORDER — SODIUM CHLORIDE, SODIUM LACTATE, POTASSIUM CHLORIDE, CALCIUM CHLORIDE 600; 310; 30; 20 MG/100ML; MG/100ML; MG/100ML; MG/100ML
75 INJECTION, SOLUTION INTRAVENOUS CONTINUOUS
Status: DISCONTINUED | OUTPATIENT
Start: 2020-01-06 | End: 2020-01-12

## 2020-01-06 RX ORDER — ACETAMINOPHEN 325 MG/1
650 TABLET ORAL
Status: DISCONTINUED | OUTPATIENT
Start: 2020-01-06 | End: 2020-01-13 | Stop reason: HOSPADM

## 2020-01-06 RX ORDER — MAGNESIUM SULFATE 100 %
4 CRYSTALS MISCELLANEOUS AS NEEDED
Status: DISCONTINUED | OUTPATIENT
Start: 2020-01-06 | End: 2020-01-13 | Stop reason: HOSPADM

## 2020-01-06 RX ORDER — INSULIN GLARGINE 100 [IU]/ML
10 INJECTION, SOLUTION SUBCUTANEOUS EVERY EVENING
Status: DISCONTINUED | OUTPATIENT
Start: 2020-01-07 | End: 2020-01-07

## 2020-01-06 RX ORDER — LISINOPRIL 10 MG/1
20 TABLET ORAL DAILY
Status: DISCONTINUED | OUTPATIENT
Start: 2020-01-07 | End: 2020-01-13 | Stop reason: HOSPADM

## 2020-01-06 RX ORDER — INSULIN LISPRO 100 [IU]/ML
INJECTION, SOLUTION INTRAVENOUS; SUBCUTANEOUS
Status: DISCONTINUED | OUTPATIENT
Start: 2020-01-07 | End: 2020-01-13 | Stop reason: HOSPADM

## 2020-01-06 RX ORDER — DEXTROSE MONOHYDRATE 100 MG/ML
0-250 INJECTION, SOLUTION INTRAVENOUS AS NEEDED
Status: DISCONTINUED | OUTPATIENT
Start: 2020-01-06 | End: 2020-01-13 | Stop reason: HOSPADM

## 2020-01-06 RX ADMIN — VANCOMYCIN HYDROCHLORIDE 2000 MG: 10 INJECTION, POWDER, LYOPHILIZED, FOR SOLUTION INTRAVENOUS at 20:30

## 2020-01-06 RX ADMIN — SODIUM CHLORIDE 1000 ML: 900 INJECTION, SOLUTION INTRAVENOUS at 17:27

## 2020-01-06 RX ADMIN — PIPERACILLIN SODIUM AND TAZOBACTAM SODIUM 3.38 G: 3; .375 INJECTION, POWDER, LYOPHILIZED, FOR SOLUTION INTRAVENOUS at 19:20

## 2020-01-06 NOTE — ED TRIAGE NOTES
Pt arrived via EMS from [facility]. Pt has bilat lower extremity weeping wounds.  EMS reports facility states they can no longer manage his wounds

## 2020-01-06 NOTE — ED NOTES
Opened pt's bandages, right lower leg slightly erythremic with scabs down the shin. Left lower leg has large odorus weeping wound.       Vitals WDL

## 2020-01-06 NOTE — ED PROVIDER NOTES
71 y.o. male with past medical history significant for cellulitis who presents from home with chief complaint of wounds. Patient was seen at [facility] ED ~1 month ago (12/04/19) and admitted (12/04/19-12/11/19) for cellulitis, patient was discharged home to [facility]. Patients assisted living home sent patient to ED today because they could not manage patient's wound care anymore. Patient is on Bumex. Patient is extremely hard of hearing and cannot answer questions. There are no other acute medical concerns at this time. Full history, physical exam, and ROS unable to be obtained due to:  Hard of hearing. Note written by Octavia Dia, as dictated by Marvie Soulier, MD 4:50 PM     The history is provided by the patient. No  was used. No past medical history on file. No past surgical history on file. No family history on file.     Social History     Socioeconomic History    Marital status: SINGLE     Spouse name: Not on file    Number of children: Not on file    Years of education: Not on file    Highest education level: Not on file   Occupational History    Not on file   Social Needs    Financial resource strain: Not on file    Food insecurity:     Worry: Not on file     Inability: Not on file    Transportation needs:     Medical: Not on file     Non-medical: Not on file   Tobacco Use    Smoking status: Not on file   Substance and Sexual Activity    Alcohol use: Not on file    Drug use: Not on file    Sexual activity: Not on file   Lifestyle    Physical activity:     Days per week: Not on file     Minutes per session: Not on file    Stress: Not on file   Relationships    Social connections:     Talks on phone: Not on file     Gets together: Not on file     Attends [employer] service: Not on file     Active member of club or organization: Not on file     Attends meetings of clubs or organizations: Not on file     Relationship status: Not on file    Intimate partner violence:     Fear of current or ex partner: Not on file     Emotionally abused: Not on file     Physically abused: Not on file     Forced sexual activity: Not on file   Other Topics Concern    Not on file   Social History Narrative    Not on file         ALLERGIES: Patient has no known allergies. Review of Systems   Reason unable to perform ROS: hard of hearing. Vitals:    01/06/20 1551 01/06/20 1558   BP: 130/84    Pulse: 71    Resp: 18    Temp: 98.7 °F (37.1 °C)    SpO2: 94% 97%            Physical Exam  Vitals signs reviewed. Constitutional:       General: He is not in acute distress. Appearance: He is well-developed. Comments: Extremely hard of hearing but appears to be alert. HENT:      Head: Normocephalic and atraumatic. Eyes:      Conjunctiva/sclera: Conjunctivae normal.   Neck:      Musculoskeletal: Normal range of motion and neck supple. Cardiovascular:      Rate and Rhythm: Normal rate and regular rhythm. Heart sounds: Normal heart sounds. Pulmonary:      Effort: Pulmonary effort is normal. No respiratory distress. Breath sounds: Normal breath sounds. Abdominal:      Palpations: Abdomen is soft. Tenderness: There is no tenderness. There is no guarding. Musculoskeletal:      Comments: Large area on left lower extremity of erythema, edema, warmth, but no tenderness to palpation or crepitus. Bilateral chronic venous stasis. Skin:     General: Skin is warm and dry. Neurological:      Mental Status: He is alert. Motor: No abnormal muscle tone. Note written by Octavia Dc, as dictated by Edelmira Durham MD 4:50 PM      MDM       Procedures    Hospitalist Perfect Serve for Admission  7:06 PM    ED Room Number: ER05/05  Patient Name and age:  Cristina Hem 71 y.o.  male  Working Diagnosis:   1. Diabetic foot infection ([address]    2.  Hyperglycemia      Readmission: yes  Isolation Requirements:  no  Recommended Level of Care:  med/surg  Code Status:  Full Code  Department:[facility] ED - [phone]  Other:  Brisa and Arnie started.

## 2020-01-07 PROBLEM — I87.2 VENOUS INSUFFICIENCY OF BOTH LOWER EXTREMITIES: Status: ACTIVE | Noted: 2020-01-07

## 2020-01-07 PROBLEM — L03.116 CELLULITIS OF LEFT LOWER LEG: Status: ACTIVE | Noted: 2020-01-07

## 2020-01-07 LAB
ALBUMIN SERPL-MCNC: 2.4 G/DL (ref 3.5–5)
ALBUMIN/GLOB SERPL: 0.7 {RATIO} (ref 1.1–2.2)
ALP SERPL-CCNC: 75 U/L (ref 45–117)
ALT SERPL-CCNC: 20 U/L (ref 12–78)
ANION GAP SERPL CALC-SCNC: 6 MMOL/L (ref 5–15)
AST SERPL-CCNC: 17 U/L (ref 15–37)
BASOPHILS # BLD: 0 K/UL (ref 0–0.1)
BASOPHILS NFR BLD: 0 % (ref 0–1)
BILIRUB SERPL-MCNC: 0.3 MG/DL (ref 0.2–1)
BUN SERPL-MCNC: 12 MG/DL (ref 6–20)
BUN/CREAT SERPL: 14 (ref 12–20)
CALCIUM SERPL-MCNC: 7.9 MG/DL (ref 8.5–10.1)
CHLORIDE SERPL-SCNC: 100 MMOL/L (ref 97–108)
CO2 SERPL-SCNC: 29 MMOL/L (ref 21–32)
COMMENT, HOLDF: NORMAL
CREAT SERPL-MCNC: 0.88 MG/DL (ref 0.7–1.3)
DIFFERENTIAL METHOD BLD: ABNORMAL
EOSINOPHIL # BLD: 0.2 K/UL (ref 0–0.4)
EOSINOPHIL NFR BLD: 2 % (ref 0–7)
ERYTHROCYTE [DISTWIDTH] IN BLOOD BY AUTOMATED COUNT: 13.3 % (ref 11.5–14.5)
FERRITIN SERPL-MCNC: 142 NG/ML (ref 26–388)
FOLATE SERPL-MCNC: 17.8 NG/ML (ref 5–21)
GLOBULIN SER CALC-MCNC: 3.5 G/DL (ref 2–4)
GLUCOSE BLD STRIP.AUTO-MCNC: 230 MG/DL (ref 65–100)
GLUCOSE BLD STRIP.AUTO-MCNC: 242 MG/DL (ref 65–100)
GLUCOSE BLD STRIP.AUTO-MCNC: 319 MG/DL (ref 65–100)
GLUCOSE BLD STRIP.AUTO-MCNC: 340 MG/DL (ref 65–100)
GLUCOSE BLD STRIP.AUTO-MCNC: 389 MG/DL (ref 65–100)
GLUCOSE SERPL-MCNC: 238 MG/DL (ref 65–100)
HCT VFR BLD AUTO: 30.3 % (ref 36.6–50.3)
HGB BLD-MCNC: 9.7 G/DL (ref 12.1–17)
IMM GRANULOCYTES # BLD AUTO: 0.1 K/UL (ref 0–0.04)
IMM GRANULOCYTES NFR BLD AUTO: 1 % (ref 0–0.5)
IRON SATN MFR SERPL: 12 % (ref 20–50)
IRON SERPL-MCNC: 21 UG/DL (ref 35–150)
LACTATE SERPL-SCNC: 0.8 MMOL/L (ref 0.4–2)
LYMPHOCYTES # BLD: 1.2 K/UL (ref 0.8–3.5)
LYMPHOCYTES NFR BLD: 13 % (ref 12–49)
MCH RBC QN AUTO: 30.5 PG (ref 26–34)
MCHC RBC AUTO-ENTMCNC: 32 G/DL (ref 30–36.5)
MCV RBC AUTO: 95.3 FL (ref 80–99)
MONOCYTES # BLD: 0.5 K/UL (ref 0–1)
MONOCYTES NFR BLD: 5 % (ref 5–13)
NEUTS SEG # BLD: 7.5 K/UL (ref 1.8–8)
NEUTS SEG NFR BLD: 79 % (ref 32–75)
NRBC # BLD: 0 K/UL (ref 0–0.01)
NRBC BLD-RTO: 0 PER 100 WBC
PLATELET # BLD AUTO: 295 K/UL (ref 150–400)
PMV BLD AUTO: 9.2 FL (ref 8.9–12.9)
POTASSIUM SERPL-SCNC: 3.9 MMOL/L (ref 3.5–5.1)
PROT SERPL-MCNC: 5.9 G/DL (ref 6.4–8.2)
RBC # BLD AUTO: 3.18 M/UL (ref 4.1–5.7)
SAMPLES BEING HELD,HOLD: NORMAL
SERVICE CMNT-IMP: ABNORMAL
SODIUM SERPL-SCNC: 135 MMOL/L (ref 136–145)
TIBC SERPL-MCNC: 169 UG/DL (ref 250–450)
TSH SERPL DL<=0.05 MIU/L-ACNC: 0.89 UIU/ML (ref 0.36–3.74)
VIT B12 SERPL-MCNC: 173 PG/ML (ref 193–986)
WBC # BLD AUTO: 9.4 K/UL (ref 4.1–11.1)

## 2020-01-07 PROCEDURE — 84443 ASSAY THYROID STIM HORMONE: CPT

## 2020-01-07 PROCEDURE — 74011636637 HC RX REV CODE- 636/637: Performed by: INTERNAL MEDICINE

## 2020-01-07 PROCEDURE — 36600 WITHDRAWAL OF ARTERIAL BLOOD: CPT

## 2020-01-07 PROCEDURE — 83540 ASSAY OF IRON: CPT

## 2020-01-07 PROCEDURE — 87186 SC STD MICRODIL/AGAR DIL: CPT

## 2020-01-07 PROCEDURE — 74011636637 HC RX REV CODE- 636/637: Performed by: HOSPITALIST

## 2020-01-07 PROCEDURE — 87077 CULTURE AEROBIC IDENTIFY: CPT

## 2020-01-07 PROCEDURE — 82728 ASSAY OF FERRITIN: CPT

## 2020-01-07 PROCEDURE — 82962 GLUCOSE BLOOD TEST: CPT

## 2020-01-07 PROCEDURE — 36415 COLL VENOUS BLD VENIPUNCTURE: CPT

## 2020-01-07 PROCEDURE — 74011250637 HC RX REV CODE- 250/637: Performed by: HOSPITALIST

## 2020-01-07 PROCEDURE — 87075 CULTR BACTERIA EXCEPT BLOOD: CPT

## 2020-01-07 PROCEDURE — 80053 COMPREHEN METABOLIC PANEL: CPT

## 2020-01-07 PROCEDURE — 74011636637 HC RX REV CODE- 636/637: Performed by: NURSE PRACTITIONER

## 2020-01-07 PROCEDURE — 74011250637 HC RX REV CODE- 250/637: Performed by: INTERNAL MEDICINE

## 2020-01-07 PROCEDURE — 87205 SMEAR GRAM STAIN: CPT

## 2020-01-07 PROCEDURE — 74011000258 HC RX REV CODE- 258: Performed by: INTERNAL MEDICINE

## 2020-01-07 PROCEDURE — 65270000029 HC RM PRIVATE

## 2020-01-07 PROCEDURE — 85025 COMPLETE CBC W/AUTO DIFF WBC: CPT

## 2020-01-07 PROCEDURE — 87147 CULTURE TYPE IMMUNOLOGIC: CPT

## 2020-01-07 PROCEDURE — 83605 ASSAY OF LACTIC ACID: CPT

## 2020-01-07 PROCEDURE — 82746 ASSAY OF FOLIC ACID SERUM: CPT

## 2020-01-07 PROCEDURE — 74011250636 HC RX REV CODE- 250/636: Performed by: INTERNAL MEDICINE

## 2020-01-07 PROCEDURE — 82607 VITAMIN B-12: CPT

## 2020-01-07 RX ORDER — BALSAM PERU/CASTOR OIL
OINTMENT (GRAM) TOPICAL EVERY 8 HOURS
Status: DISCONTINUED | OUTPATIENT
Start: 2020-01-07 | End: 2020-01-13 | Stop reason: HOSPADM

## 2020-01-07 RX ORDER — LANOLIN ALCOHOL/MO/W.PET/CERES
1 CREAM (GRAM) TOPICAL
Status: DISCONTINUED | OUTPATIENT
Start: 2020-01-08 | End: 2020-01-13 | Stop reason: HOSPADM

## 2020-01-07 RX ORDER — INSULIN LISPRO 100 [IU]/ML
5 INJECTION, SOLUTION INTRAVENOUS; SUBCUTANEOUS ONCE
Status: COMPLETED | OUTPATIENT
Start: 2020-01-07 | End: 2020-01-07

## 2020-01-07 RX ORDER — UREA 10 %
100 LOTION (ML) TOPICAL DAILY
Status: DISCONTINUED | OUTPATIENT
Start: 2020-01-08 | End: 2020-01-13 | Stop reason: HOSPADM

## 2020-01-07 RX ORDER — IPRATROPIUM BROMIDE AND ALBUTEROL SULFATE 2.5; .5 MG/3ML; MG/3ML
3 SOLUTION RESPIRATORY (INHALATION)
Status: DISCONTINUED | OUTPATIENT
Start: 2020-01-07 | End: 2020-01-13 | Stop reason: HOSPADM

## 2020-01-07 RX ORDER — INSULIN GLARGINE 100 [IU]/ML
15 INJECTION, SOLUTION SUBCUTANEOUS EVERY EVENING
Status: DISCONTINUED | OUTPATIENT
Start: 2020-01-07 | End: 2020-01-08

## 2020-01-07 RX ORDER — VANCOMYCIN 1.75 GRAM/500 ML IN 0.9 % SODIUM CHLORIDE INTRAVENOUS
1750
Status: DISCONTINUED | OUTPATIENT
Start: 2020-01-07 | End: 2020-01-09

## 2020-01-07 RX ADMIN — HEPARIN SODIUM 5000 UNITS: 5000 INJECTION INTRAVENOUS; SUBCUTANEOUS at 00:26

## 2020-01-07 RX ADMIN — HEPARIN SODIUM 5000 UNITS: 5000 INJECTION INTRAVENOUS; SUBCUTANEOUS at 17:05

## 2020-01-07 RX ADMIN — INSULIN LISPRO 5 UNITS: 100 INJECTION, SOLUTION INTRAVENOUS; SUBCUTANEOUS at 22:25

## 2020-01-07 RX ADMIN — PIPERACILLIN AND TAZOBACTAM 3.38 G: 3; .375 INJECTION, POWDER, FOR SOLUTION INTRAVENOUS at 10:53

## 2020-01-07 RX ADMIN — CASTOR OIL AND BALSAM, PERU: 788; 87 OINTMENT TOPICAL at 17:08

## 2020-01-07 RX ADMIN — INSULIN LISPRO 3 UNITS: 100 INJECTION, SOLUTION INTRAVENOUS; SUBCUTANEOUS at 08:54

## 2020-01-07 RX ADMIN — PIPERACILLIN AND TAZOBACTAM 3.38 G: 3; .375 INJECTION, POWDER, FOR SOLUTION INTRAVENOUS at 19:43

## 2020-01-07 RX ADMIN — METOPROLOL SUCCINATE 100 MG: 50 TABLET, EXTENDED RELEASE ORAL at 09:41

## 2020-01-07 RX ADMIN — Medication 10 ML: at 17:09

## 2020-01-07 RX ADMIN — PIPERACILLIN AND TAZOBACTAM 3.38 G: 3; .375 INJECTION, POWDER, FOR SOLUTION INTRAVENOUS at 03:09

## 2020-01-07 RX ADMIN — MELATONIN 4.5 MG: at 22:24

## 2020-01-07 RX ADMIN — CASTOR OIL AND BALSAM, PERU: 788; 87 OINTMENT TOPICAL at 22:29

## 2020-01-07 RX ADMIN — Medication 1 CAPSULE: at 09:41

## 2020-01-07 RX ADMIN — SERTRALINE HYDROCHLORIDE 25 MG: 50 TABLET ORAL at 00:11

## 2020-01-07 RX ADMIN — MELATONIN 4.5 MG: at 00:10

## 2020-01-07 RX ADMIN — LISINOPRIL 20 MG: 10 TABLET ORAL at 09:41

## 2020-01-07 RX ADMIN — SODIUM CHLORIDE, SODIUM LACTATE, POTASSIUM CHLORIDE, AND CALCIUM CHLORIDE 75 ML/HR: 600; 310; 30; 20 INJECTION, SOLUTION INTRAVENOUS at 00:27

## 2020-01-07 RX ADMIN — HEPARIN SODIUM 5000 UNITS: 5000 INJECTION INTRAVENOUS; SUBCUTANEOUS at 22:24

## 2020-01-07 RX ADMIN — MAGNESIUM OXIDE TAB 400 MG (241.3 MG ELEMENTAL MG) 400 MG: 400 (241.3 MG) TAB at 09:41

## 2020-01-07 RX ADMIN — DOCUSATE SODIUM 100 MG: 100 CAPSULE, LIQUID FILLED ORAL at 09:41

## 2020-01-07 RX ADMIN — VANCOMYCIN HYDROCHLORIDE 1750 MG: 10 INJECTION, POWDER, LYOPHILIZED, FOR SOLUTION INTRAVENOUS at 12:40

## 2020-01-07 RX ADMIN — INSULIN LISPRO 7 UNITS: 100 INJECTION, SOLUTION INTRAVENOUS; SUBCUTANEOUS at 17:04

## 2020-01-07 RX ADMIN — HEPARIN SODIUM 5000 UNITS: 5000 INJECTION INTRAVENOUS; SUBCUTANEOUS at 08:54

## 2020-01-07 RX ADMIN — SERTRALINE HYDROCHLORIDE 25 MG: 50 TABLET ORAL at 22:24

## 2020-01-07 RX ADMIN — INSULIN LISPRO 7 UNITS: 100 INJECTION, SOLUTION INTRAVENOUS; SUBCUTANEOUS at 12:33

## 2020-01-07 RX ADMIN — INSULIN GLARGINE 15 UNITS: 100 INJECTION, SOLUTION SUBCUTANEOUS at 22:24

## 2020-01-07 NOTE — ROUTINE PROCESS
Bedside shift change report given to JIE Zacarias (oncoming nurse) by Mendez Richards RN(offgoing nurse). Report given with SBAR.

## 2020-01-07 NOTE — PROGRESS NOTES
NUTRITION COMPLETE ASSESSMENT    RECOMMENDATIONS:   1. Consistent CHO diet  2. RD to add Ensure High Protein vanilla BID  3. Staff assist with menu selections and meal tray set-up. (Pt is severely Koi)     Interventions/Plan:   Food/Nutrient Delivery:  Modify diet/texture/consistency/nutrients Commercial supplement(Ensure High Protein)        Nutrition Education:     Coordination of Care:    Nutrition Counseling:        Assessment:   Reason for Assessment:   [x] Provider Consult    Diet: Consistent CHO  Supplements: Ensure High Protein  Nutritionally Significant Medications: [x] Reviewed & Includes: Vancomycin, Zosyn, Colace, RISAQUAD, B12, Lispro NS, Humalog    Meal Intake: No data found. Subjective:  Want grilled cheese, like iced tea. Eat scrambled eggs for breakfast no cereal.     Objective:  PMH: Type 2 DM; HTN; hearing impairment; cellulitis LE [facility] 12/11/2019); B-LE venous stasis. Admit from [facility] AL: L-leg wound and facility, \"can no longer care for the patient's wounds\". BMI 32 obesity. Only one prior wt from recent [facility] admit 227# (12/10/19); Admit yesterday, wt 231# reflects ~5# (2%) increase x past month. ? UBW, no weights on [facility] transfer report. Last A1C 8.3 (12/4/2019); POC  and 230 today. Diet order consistent CHO, Rx Lantus and Lispro (NS). ? Diet, insulin regimen or patient compliance at AL. WOCN noted: POA LL-leg, red with partial thickness wound and small drainage; L-dorsal foot partial thickness; base of toes ? Blisters with open area left posterior toe base; R-Lower leg superficial areas. Appetite good and patient asking for grilled cheese and iced tea. Very difficult communication with poor hearing. ?consider trying \"Pocket Talker\" to improve communication. Appears to chew/swallow without difficulty. No hx dysphagia. B12 low: 173 (1/7/20); MD notified and Rx B12 added.      Plan to add Ensure High Protein  BID = 320 kcal, 32 gm pro and 32 gm CHO/day. Noted Rx RISAQUAD with ABX. Last BM 1/6/20. Estimated Nutrition Needs:   Kcals/day: 2200 Kcals/day  Protein: 120 g(~1.2 gm/kg)  Fluid: 2200 ml(~1 mL/kcal)     Based On: Shanell Rockwell(MSJ x 1.2)  Weight Used: Actual wt(105.2 kg)    Pt expected to meet estimated nutrient needs:  [x]   Yes   Comparative Standards:  ;  ;      Nutrition Diagnosis:   1. Inadequate vitamin/mineral intake related to B12 as evidenced by B12 deficiency 173 (1/7/20); Rx B12    2. Altered nutrition-related lab values related to glycemic control as evidenced by POC  and 230; A1C 8.3 (12/4/19); consistent CHO diet; Rx Lantus and Humalog insulin. Goals:     Consume % all meals and minimum 20 gm supplemental protein daily starting in next 24 hr through discharge     Monitoring & Evaluation:    - Carbohydrate intake, Protein intake, Total energy intake, Liquid meal replacement   - Glucose profile, Weight/weight change, Lean body mass, fat free mass    Previous Nutrition Goals Met:  N/A  Previous Recommendations:      N/A    Education & Discharge Needs:   [x] None Identified   [x] Participated in care plan, discharge planning, and/or interdisciplinary rounds        Cultural, Mu-ism and ethnic food preferences identified:   None    Skin Integrity: [x] See above WOCN; Hx B-LE venous stasis  Edema: [x]None  Last BM: 1/6/19  Food Allergies: [x]NKA    Anthropometrics:    Weight Loss Metrics 1/6/2020 12/10/2019 12/4/2019   Today's Wt 231 lb 14.8 oz 227 lb 1.6 oz -   BMI 32.35 kg/m2 - 31.67 kg/m2      Last 3 Recorded Weights in this Encounter    01/06/20 1850   Weight: 105.2 kg (231 lb 14.8 oz)          ,    Body mass index is 32.35 kg/m². IBW : 78 kg (172 lb),    Usual Body Weight: (Unknown;  Last wt 227# 12/10/19),      Labs:    Lab Results   Component Value Date/Time    Sodium 135 (L) 01/07/2020 04:56 AM    Potassium 3.9 01/07/2020 04:56 AM    Chloride 100 01/07/2020 04:56 AM    CO2 29 01/07/2020 04:56 AM Glucose 238 (H) 01/07/2020 04:56 AM    BUN 12 01/07/2020 04:56 AM    Creatinine 0.88 01/07/2020 04:56 AM    Calcium 7.9 (L) 01/07/2020 04:56 AM    Magnesium 1.6 12/05/2019 03:17 AM    Albumin 2.4 (L) 01/07/2020 04:56 AM     Lab Results   Component Value Date/Time    Hemoglobin A1c 8.3 (H) 12/04/2019 11:27 AM     Lab Results   Component Value Date/Time    Glucose 238 (H) 01/07/2020 04:56 AM    Glucose (POC) 319 (H) 01/07/2020 11:52 AM      Lab Results   Component Value Date/Time    ALT (SGPT) 20 01/07/2020 04:56 AM    AST (SGOT) 17 01/07/2020 04:56 AM    Alk.  phosphatase 75 01/07/2020 04:56 AM    Bilirubin, total 0.3 01/07/2020 04:56 AM        Sergey Wall, RD

## 2020-01-07 NOTE — PROGRESS NOTES
Foot and Ankle Progress Note    Admit Date: 1/6/2020  Hospital day 1    Subjective:     Patient was admitted one day ago.   I was consulted for cellulitis left leg    Current Facility-Administered Medications   Medication Dose Route Frequency    vancomycin (VANCOCIN) 1750 mg in  ml infusion  1,750 mg IntraVENous Q16H    [START ON 1/8/2020] ferrous sulfate tablet 325 mg  1 Tab Oral DAILY WITH BREAKFAST    insulin glargine (LANTUS) injection 15 Units  15 Units SubCUTAneous QPM    [START ON 1/8/2020] cyanocobalamin (VITAMIN B12) tablet 100 mcg  100 mcg Oral DAILY    albuterol-ipratropium (DUO-NEB) 2.5 MG-0.5 MG/3 ML  3 mL Nebulization Q6H PRN    balsam peru-castor oil (VENELEX) ointment   Topical Q8H    docusate sodium (COLACE) capsule 100 mg  100 mg Oral DAILY    metoprolol succinate (TOPROL-XL) XL tablet 100 mg  100 mg Oral DAILY    sertraline (ZOLOFT) tablet 25 mg  25 mg Oral QHS    melatonin tablet 4.5 mg  4.5 mg Oral QHS    loperamide (IMODIUM) capsule 2 mg  2 mg Oral Q6H PRN    lisinopril (PRINIVIL, ZESTRIL) tablet 20 mg  20 mg Oral DAILY    magnesium oxide (MAG-OX) tablet 400 mg  400 mg Oral DAILY    sodium chloride (NS) flush 5-40 mL  5-40 mL IntraVENous Q8H    sodium chloride (NS) flush 5-40 mL  5-40 mL IntraVENous PRN    acetaminophen (TYLENOL) tablet 650 mg  650 mg Oral Q4H PRN    heparin (porcine) injection 5,000 Units  5,000 Units SubCUTAneous Q8H    ondansetron (ZOFRAN) injection 4 mg  4 mg IntraVENous Q4H PRN    lactobac ac& pc-s.therm-b.anim (VITOR Q/RISAQUAD)  1 Cap Oral DAILY    piperacillin-tazobactam (ZOSYN) 3.375 g in 0.9% sodium chloride (MBP/ADV) 100 mL  3.375 g IntraVENous Q8H    insulin lispro (HUMALOG) injection   SubCUTAneous AC&HS    glucose chewable tablet 16 g  4 Tab Oral PRN    glucagon (GLUCAGEN) injection 1 mg  1 mg IntraMUSCular PRN    dextrose 10% infusion 0-250 mL  0-250 mL IntraVENous PRN    lactated Ringers infusion  75 mL/hr IntraVENous CONTINUOUS    Vancomycin- pharmacy to dose   Other Rx Dosing/Monitoring        Review of Systems  Reviewed admitting H&P    Objective:     Patient Vitals for the past 8 hrs:   BP Temp Pulse Resp   01/07/20 1509 144/64 97.4 °F (36.3 °C) 78 18     No intake/output data recorded. No intake/output data recorded. Physical Exam: LOWER LEGS    Poor hygiene  palpable pedal pulses with diminished epicritic sensation  Edematous bright red warm lower let leg with multiple excoriations    WBC's 9.4  vnoeus duplex did not show any evidence of DVT          Data Review   Recent Results (from the past 24 hour(s))   GLUCOSE, POC    Collection Time: 01/07/20  1:30 AM   Result Value Ref Range    Glucose (POC) 242 (H) 65 - 100 mg/dL    Performed by Marie Pereira    CULTURE, ANAEROBIC    Collection Time: 01/07/20  4:17 AM   Result Value Ref Range    Special Requests: NO SPECIAL REQUESTS      Culture result: PENDING    CULTURE, WOUND W GRAM STAIN    Collection Time: 01/07/20  4:17 AM   Result Value Ref Range    Special Requests: NO SPECIAL REQUESTS      GRAM STAIN NO WBC'S SEEN      GRAM STAIN NO ORGANISMS SEEN      Culture result: PENDING    FERRITIN    Collection Time: 01/07/20  4:53 AM   Result Value Ref Range    Ferritin 142 26 - 388 NG/ML   SAMPLES BEING HELD    Collection Time: 01/07/20  4:53 AM   Result Value Ref Range    SAMPLES BEING HELD 1PST     COMMENT        Add-on orders for these samples will be processed based on acceptable specimen integrity and analyte stability, which may vary by analyte.    TSH 3RD GENERATION    Collection Time: 01/07/20  4:54 AM   Result Value Ref Range    TSH 0.89 0.36 - 3.74 uIU/mL   FOLATE    Collection Time: 01/07/20  4:54 AM   Result Value Ref Range    Folate 17.8 5.0 - 21.0 ng/mL   VITAMIN B12    Collection Time: 01/07/20  4:54 AM   Result Value Ref Range    Vitamin B12 173 (L) 193 - 811 pg/mL   METABOLIC PANEL, COMPREHENSIVE    Collection Time: 01/07/20  4:56 AM   Result Value Ref Range    Sodium 135 (L) 136 - 145 mmol/L    Potassium 3.9 3.5 - 5.1 mmol/L    Chloride 100 97 - 108 mmol/L    CO2 29 21 - 32 mmol/L    Anion gap 6 5 - 15 mmol/L    Glucose 238 (H) 65 - 100 mg/dL    BUN 12 6 - 20 MG/DL    Creatinine 0.88 0.70 - 1.30 MG/DL    BUN/Creatinine ratio 14 12 - 20      GFR est AA >60 >60 ml/min/1.73m2    GFR est non-AA >60 >60 ml/min/1.73m2    Calcium 7.9 (L) 8.5 - 10.1 MG/DL    Bilirubin, total 0.3 0.2 - 1.0 MG/DL    ALT (SGPT) 20 12 - 78 U/L    AST (SGOT) 17 15 - 37 U/L    Alk. phosphatase 75 45 - 117 U/L    Protein, total 5.9 (L) 6.4 - 8.2 g/dL    Albumin 2.4 (L) 3.5 - 5.0 g/dL    Globulin 3.5 2.0 - 4.0 g/dL    A-G Ratio 0.7 (L) 1.1 - 2.2     CBC WITH AUTOMATED DIFF    Collection Time: 01/07/20  5:14 AM   Result Value Ref Range    WBC 9.4 4.1 - 11.1 K/uL    RBC 3.18 (L) 4.10 - 5.70 M/uL    HGB 9.7 (L) 12.1 - 17.0 g/dL    HCT 30.3 (L) 36.6 - 50.3 %    MCV 95.3 80.0 - 99.0 FL    MCH 30.5 26.0 - 34.0 PG    MCHC 32.0 30.0 - 36.5 g/dL    RDW 13.3 11.5 - 14.5 %    PLATELET 408 000 - 772 K/uL    MPV 9.2 8.9 - 12.9 FL    NRBC 0.0 0  WBC    ABSOLUTE NRBC 0.00 0.00 - 0.01 K/uL    NEUTROPHILS 79 (H) 32 - 75 %    LYMPHOCYTES 13 12 - 49 %    MONOCYTES 5 5 - 13 %    EOSINOPHILS 2 0 - 7 %    BASOPHILS 0 0 - 1 %    IMMATURE GRANULOCYTES 1 (H) 0.0 - 0.5 %    ABS. NEUTROPHILS 7.5 1.8 - 8.0 K/UL    ABS. LYMPHOCYTES 1.2 0.8 - 3.5 K/UL    ABS. MONOCYTES 0.5 0.0 - 1.0 K/UL    ABS. EOSINOPHILS 0.2 0.0 - 0.4 K/UL    ABS. BASOPHILS 0.0 0.0 - 0.1 K/UL    ABS. IMM.  GRANS. 0.1 (H) 0.00 - 0.04 K/UL    DF AUTOMATED     IRON PROFILE    Collection Time: 01/07/20  5:14 AM   Result Value Ref Range    Iron 21 (L) 35 - 150 ug/dL    TIBC 169 (L) 250 - 450 ug/dL    Iron % saturation 12 (L) 20 - 50 %   LACTIC ACID    Collection Time: 01/07/20  5:14 AM   Result Value Ref Range    Lactic acid 0.8 0.4 - 2.0 MMOL/L   GLUCOSE, POC    Collection Time: 01/07/20  7:35 AM   Result Value Ref Range    Glucose (POC) 230 (H) 65 - 100 mg/dL    Performed by Nate Miranda    GLUCOSE, POC    Collection Time: 01/07/20 11:52 AM   Result Value Ref Range    Glucose (POC) 319 (H) 65 - 100 mg/dL    Performed by Vignesh Cook, POC    Collection Time: 01/07/20  4:29 PM   Result Value Ref Range    Glucose (POC) 340 (H) 65 - 100 mg/dL    Performed by Sun Teran (CON)            Assessment:     Principal Problem:    Diabetic foot infection ([facility] [address] (1/6/2020)      cellulitis left lower leg    venous insufficiency  lower legs  Plan:   I will plc a consult and deafer  This case to infectious  Disease  Otherwise continue with current wound care

## 2020-01-07 NOTE — PROGRESS NOTES
Admission Medication Reconciliation:    Information obtained from:  medication list from [address]:  NO    Comments/Recommendations: Updated PTA meds/reviewed patient's allergies. 1)  Medication changes (since last review): Added  - n/a    Adjusted  - n/a    Removed  - cephalexin, Lotrisone, glipizide, loperamide       ¹[employer] pharmacy benefit data reflects medications filled and processed through the patient's insurance, however   this data does NOT capture whether the medication was picked up or is currently being taken by the patient. Allergies:  Patient has no known allergies. Significant PMH/Disease States: No past medical history on file. Chief Complaint for this Admission:    Chief Complaint   Patient presents with    Wound Check     Prior to Admission Medications:   Prior to Admission Medications   Prescriptions Last Dose Informant Taking? bumetanide (BUMEX) 1 mg tablet 1/6/2020 at Unknown time  Yes   Sig: Take 1 Tab by mouth daily. docusate sodium (COLACE) 100 mg capsule 1/6/2020 at Unknown time  Yes   Sig: Take 100 mg by mouth daily. insulin aspart U-100 (NOVOLOG U-100 INSULIN ASPART) 100 unit/mL injection 1/6/2020 at Unknown time  Yes   Sig: by SubCUTAneous route. Sliding scale:  -250 = 4 units  -300 = 6 units  -350 = 8 units  BS > 351 = 10 units   insulin glargine (LANTUS,BASAGLAR) 100 unit/mL (3 mL) inpn 1/5/2020 at Unknown time  Yes   Sig: 10 Units by SubCUTAneous route every evening. lisinopril (PRINIVIL, ZESTRIL) 20 mg tablet 1/6/2020 at Unknown time  Yes   Sig: Take 20 mg by mouth daily. magnesium oxide (MAG-OX) 400 mg tablet 1/6/2020 at Unknown time  Yes   Sig: Take 400 mg by mouth daily. melatonin 5 mg cap capsule 1/5/2020 at Unknown time  Yes   Sig: Take 5 mg by mouth nightly. metFORMIN (GLUCOPHAGE) 1,000 mg tablet 1/6/2020 at Unknown time  Yes   Sig: Take 1,000 mg by mouth two (2) times daily (with meals).    metoprolol succinate (TOPROL-XL) 100 mg tablet 1/6/2020 at Unknown time  Yes   Sig: Take 100 mg by mouth daily. sertraline (ZOLOFT) 25 mg tablet 1/5/2020 at Unknown time  Yes   Sig: Take 25 mg by mouth nightly. Facility-Administered Medications: None       Please contact the main inpatient pharmacy with any questions or concerns at [phone] and we will direct you to the clinical pharmacist covering this patient's care while in-house.    Raphael Gilford, [facility]

## 2020-01-07 NOTE — H&P
[address] [facility]  HISTORY AND PHYSICAL    Name:  Kamran Sparrow  MR#:  [id number]  :  1950  ACCOUNT #:  [de-identified]  ADMIT DATE:  2020    The patient was seen, evaluated and admitted by me on 2020. PRIMARY CARE PHYSICIAN:  Unknown. SOURCE OF INFORMATION:  The patient who is not a good historian. CHIEF COMPLAINT:  Left leg wound. HISTORY OF PRESENT ILLNESS:  This is a 22-year-old man with past medical history significant for bilateral lower extremity venous stasis, type 2 diabetes, hypertension, and hearing impairment, who was in his usual state of health until a couple of days ago when the patient developed worsening of his leg wounds. The patient has bilateral lower extremity venous stasis with superimposed wounds. He was recently admitted to this hospital from 2019 to 2019. The patient was admitted and treated for cellulitis of lower extremity. During that hospitalization, ultrasound of the lower extremity was obtained. This was negative for DVT. Echocardiogram was also obtained that shows well-preserved ejection fraction. The patient received antibiotics and was discharged back to the assisted living facility where the patient resides. The patient was sent to the emergency room today from the assisted living facility because according to report, they can no longer take care of the patient's wounds. The patient denies fever, rigors, chills. The patient has significant hearing impairment. The patient is also concerned that they are dressing the wounds too frequently at the assisted living facility. He is of the opinion that the wounds should not be covered. The patient denies any associated pain. No fever, no rigors, no chills. When the patient arrived at the emergency room, he was started on antibiotics and was referred to the hospitalist service for evaluation for admission. PAST MEDICAL HISTORY:  1. Type 2 diabetes.   2.  Bilateral lower extremity venous stasis. 3.  Hypertension. 4.  Hearing impairment. ALLERGIES:  NO KNOWN DRUG ALLERGIES. MEDICATIONS:  1. Bumex 1 mg daily. 2.  Colace 100 mg daily. 3.  Lantus insulin 10 units subcutaneously daily in the evening. 4.  Lisinopril 20 mg daily. 5.  Magnesium oxide 400 mg daily. 6.  Melatonin 5 mg daily at bedtime. 7.  Glucophage 1000 mg twice daily. 8.  Toprol  mg daily. 9.  Zoloft 25 mg daily at bedtime. FAMILY HISTORY:  His father had diabetes. PAST SURGICAL HISTORY:  Not significant. SOCIAL HISTORY:  No history of alcohol or tobacco abuse. REVIEW OF SYSTEMS:  HEAD, EYES, EARS, NOSE, AND THROAT:  No headache, no dizziness, no blurring of vision, no photophobia. RESPIRATORY SYSTEM:  No cough, no shortness of breath, no hemoptysis. CARDIOVASCULAR SYSTEM:  No chest pain, no orthopnea, no palpitations. GASTROINTESTINAL SYSTEM:  No nausea or vomiting. No diarrhea, no constipation. GENITOURINARY SYSTEM:  No dysuria, no urgency, no frequency. All other systems are reviewed and they are negative. PHYSICAL EXAMINATION:  GENERAL APPEARANCE:  The patient appeared ill, in moderate distress. VITAL SIGNS:  On arrival at the emergency room; temperature 98.7, pulse 71, respiratory rate 18, blood pressure 130/84, oxygen saturation 94% on room air. HEENT:  Head:  Normocephalic, atraumatic. Eyes:  Normal eye movement. No redness, no drainage, no discharge. Ears:  Normal external ears with no evidence of drainage. Nose:  No deformity, no drainage. Mouth and Throat:  No visible oral lesion. Dry oral mucosa. NECK:  Neck is supple. No JVD, no thyromegaly. CHEST:  Clear breath sounds. No wheezing, no crackles. HEART:  Normal S1 and S2, regular. No clinically appreciable murmur. ABDOMEN:  Soft, obese, nontender. Normal bowel sounds. CNS:  Alert and oriented x3. No gross focal neurological deficit. Hearing impairment noted.   EXTREMITIES:  Edema 2+, pulses 2+ bilaterally. MUSCULOSKELETAL SYSTEM:  Swelling of the bilateral lower extremities noted. SKIN:  Bilateral lower extremity venous stasis noted. Redness and drainage from the left foot are noted and present on admission. PSYCHIATRY:  Normal mood and affect. LYMPHATIC SYSTEM:  No cervical lymphadenopathy. DIAGNOSTIC DATA:  None. LABORATORY DATA:  Hematology:  WBC 10.6, hemoglobin 10.8, hematocrit 35.1, platelets 015. Point-of-care lactic acid level 2.41. Chemistry:  Sodium 130, potassium 4.0, chloride 93, CO2 of 30, glucose 365, BUN 17, creatinine 1.26, calcium 8.0, total bilirubin 0.2, ALT 25, AST 19, alkaline phosphatase 98, total protein 7.4, albumin level 2.8, globulin 4.6. ASSESSMENT:  1. Left diabetic foot infection. 2.  Bilateral lower extremity venous stasis. 3.  Type 2 diabetes with hyperglycemia. 4.  Morbid obesity. 5.  Hypertension. 6.  Hyponatremia. 7.  Anemia. 8.  Hearing impairment. PLAN:  1. Left diabetic foot infection: We will admit the patient for further evaluation and treatment. We will continue with vancomycin and Zosyn started in the emergency room. Podiatry consult will be requested to assist in further evaluation and treatment. We will monitor the patient closely. 2.  Bilateral lower extremity venous stasis:  Wound Care consult will be requested for local care. We will check a chest x-ray. We will check BNP level. 3.  Type 2 diabetes with hyperglycemia:  The patient will be placed on sliding scale with insulin coverage. We will also resume home basal insulin. We will check hemoglobin A1c level if one has not been checked recently. 4.  Morbid obesity:  Dietary consult will be requested for dietary counseling. 5.  Hypertension: We will continue with preadmission medication. 6.  Hyponatremia: This is most likely due to volume depletion. We will carry out fluid therapy and repeat the patient's sodium level.   We will obtain a chest x-ray to evaluate the patient for mass lesion. 7.  Anemia: This is mild. This is most likely due to chronic disease. We will carry out anemia workup including checking a stool guaiac to rule out occult GI bleed. 8.  Hearing impairment:  The patient will continue with hearing aids and supportive therapy. OTHER ISSUES:  Code status: The patient is a full code. We will place the patient on heparin for DVT prophylaxis. FUNCTIONAL STATUS PRIOR TO ADMISSION:  The patient came from assisted living facility. The patient is ambulatory with a combination of a wheelchair and a walker.         Aruna Sahni MD      [id number]  D:  01/07/2020 3:42  T:  01/07/2020 5:03  JOB #:  [id number]

## 2020-01-07 NOTE — PROGRESS NOTES
Pharmacist Note - Vancomycin Dosing    Consult provided for this 71 y.o. male for indication of LE cellulitis. Antibiotic regimen(s): Vanc + Zosyn  Patient on vancomycin PTA? NO     Recent Labs     20  1732   WBC 10.6   CREA 1.26   BUN 17     Frequency of BMP: daily  Height: 180 cm  Weight: 105 kg  Est CrCl: 68 ml/min  Temp (24hrs), Av.7 °F (37.1 °C), Min:98.7 °F (37.1 °C), Max:98.7 °F (37.1 °C)    Goal trough = 10 - 15 mcg/mL    Therapy will be initiated with a loading dose of 2000 mg IV x 1 to be followed by a maintenance dose of 1500 mg IV every 18 hours. Pharmacy to follow patient daily and order levels / make dose adjustments as appropriate.

## 2020-01-07 NOTE — PROGRESS NOTES
[facility]  Hospitalist Group                                                                                          Hospitalist Progress Note  Nicolette Juarez MD  Answering service: [phone] or [phone] from in house phone        Date of Service:  2020  NAME:  Rimma Chapman  :  1950  MRN:  [id number]      Admission Summary: This is a 51-year-old man with past medical history significant for bilateral lower extremity venous stasis, type 2 diabetes, hypertension, and hearing impairment, who was in his usual state of health until a couple of days ago when the patient developed worsening of his leg wounds. The patient has bilateral lower extremity venous stasis with superimposed wounds. He was recently admitted to this hospital from 2019 to 2019. The patient was admitted and treated for cellulitis of lower extremity. Interval history / Subjective:      Severe heard of hearing difficult  To communicate  Wound inspected with wound RN    Assessment & Plan:     1. Left diabetic foot infection:  -  We will continue with vancomycin and Zosyn   -   Podiatry consult be requested    2. Bilateral lower extremity venous stasis:    - Wound Care consult will be requested for local care. 3.  Uncontrolled Type 2 diabetes with hyperglycemia:    - The patient will be placed on sliding scale with insulin coverage. - start basal lantus increase dose    check A1C     4. Morbid obesity:  Dietary consult will be requested for dietary counseling. 5.  Hypertension: We will continue with preadmission medication. 6.  Hyponatremia: This is most likely due to volume depletion. improved    7. Anemia iron def    Start iron supplement    8.   Hearing impairment:  The patient will continue with hearing aids and supportive therapy.     Code status: Full  DVT prophylaxis: 3 Danielle Rodas discussed with: Patient/Family and Nurse  Disposition: TBD     Hospital Problems  Date Reviewed: 1/6/2020          Codes Class Noted POA    * (Principal) Diabetic foot infection ([facility] [address] ICD-10-CM: E11.628, L08.9  ICD-9-CM: 250.80, 686.9  1/6/2020 Yes                Review of Systems:   A comprehensive review of systems was negative. No results found. Vital Signs:    Last 24hrs VS reviewed since prior progress note. Most recent are:  Visit Vitals  /73 (BP 1 Location: Right arm, BP Patient Position: At rest;Head of bed elevated (Comment degrees))   Pulse 97   Temp 98.4 °F (36.9 °C)   Resp 18   Wt 105.2 kg (231 lb 14.8 oz)   SpO2 90%   BMI 32.35 kg/m²       No intake or output data in the 24 hours ending 01/07/20 1204     Physical Examination:             Constitutional:  No acute distress, cooperative, pleasant    ENT:  Oral mucous moist, oropharynx benign. Resp:  CTA bilaterally. No wheezing/rhonchi/rales. No accessory muscle use   CV:  Regular rhythm, normal rate, no murmurs, gallops, rubs    GI:  Soft, non distended, non tender. normoactive bowel sounds, no hepatosplenomegaly     Musculoskeletal:  No edema, warm, 2+ pulses throughout    Neurologic:  Moves all extremities. AAOx3, CN II-XII reviewed     Psych:  Good insight, Not anxious nor agitated. Data Review:    I personally reviewed  Image and labs    No results found. Labs:     Recent Labs     01/07/20  0514 01/06/20  1732   WBC 9.4 10.6   HGB 9.7* 10.8*   HCT 30.3* 35.1*    321     Recent Labs     01/07/20  0456 01/06/20  1732   * 130*   K 3.9 4.0    93*   CO2 29 30   BUN 12 17   CREA 0.88 1.26   * 365*   CA 7.9* 8.0*     Recent Labs     01/07/20  0456 01/06/20  1732   SGOT 17 19   ALT 20 25   AP 75 98   TBILI 0.3 0.2   TP 5.9* 7.4   ALB 2.4* 2.8*   GLOB 3.5 4.6*     No results for input(s): INR, PTP, APTT, INREXT in the last 72 hours.    Recent Labs     01/07/20  0514 01/07/20  0453   TIBC 169*  --    PSAT 12*  --    FERR  --  142      Lab Results   Component Value Date/Time    Folate 17.8 01/07/2020 04:54 AM      No results for input(s): PH, PCO2, PO2 in the last 72 hours. No results for input(s): CPK, CKNDX, TROIQ in the last 72 hours.     No lab exists for component: CPKMB  No results found for: CHOL, CHOLX, CHLST, CHOLV, HDL, HDLP, LDL, LDLC, DLDLP, TGLX, TRIGL, TRIGP, CHHD, CHHDX  Lab Results   Component Value Date/Time    Glucose (POC) 319 (H) 01/07/2020 11:52 AM    Glucose (POC) 230 (H) 01/07/2020 07:35 AM    Glucose (POC) 242 (H) 01/07/2020 01:30 AM    Glucose (POC) 155 (H) 12/11/2019 11:30 AM    Glucose (POC) 105 (H) 12/11/2019 06:20 AM     No results found for: COLOR, APPRN, SPGRU, REFSG, HARRIET, PROTU, GLUCU, KETU, BILU, UROU, LEVI, LEUKU, GLUKE, EPSU, BACTU, WBCU, RBCU, CASTS, UCRY      Medications Reviewed:     Current Facility-Administered Medications   Medication Dose Route Frequency    vancomycin (VANCOCIN) 1750 mg in  ml infusion  1,750 mg IntraVENous Q16H    docusate sodium (COLACE) capsule 100 mg  100 mg Oral DAILY    insulin glargine (LANTUS) injection 10 Units  10 Units SubCUTAneous QPM    metoprolol succinate (TOPROL-XL) XL tablet 100 mg  100 mg Oral DAILY    sertraline (ZOLOFT) tablet 25 mg  25 mg Oral QHS    melatonin tablet 4.5 mg  4.5 mg Oral QHS    loperamide (IMODIUM) capsule 2 mg  2 mg Oral Q6H PRN    lisinopril (PRINIVIL, ZESTRIL) tablet 20 mg  20 mg Oral DAILY    magnesium oxide (MAG-OX) tablet 400 mg  400 mg Oral DAILY    sodium chloride (NS) flush 5-40 mL  5-40 mL IntraVENous Q8H    sodium chloride (NS) flush 5-40 mL  5-40 mL IntraVENous PRN    acetaminophen (TYLENOL) tablet 650 mg  650 mg Oral Q4H PRN    heparin (porcine) injection 5,000 Units  5,000 Units SubCUTAneous Q8H    ondansetron (ZOFRAN) injection 4 mg  4 mg IntraVENous Q4H PRN    lactobac ac& pc-s.therm-b.anim (VITOR Q/RISAQUAD)  1 Cap Oral DAILY    piperacillin-tazobactam (ZOSYN) 3.375 g in 0.9% sodium chloride (MBP/ADV) 100 mL  3.375 g IntraVENous Q8H    insulin lispro (HUMALOG) injection   SubCUTAneous AC&HS    glucose chewable tablet 16 g  4 Tab Oral PRN    glucagon (GLUCAGEN) injection 1 mg  1 mg IntraMUSCular PRN    dextrose 10% infusion 0-250 mL  0-250 mL IntraVENous PRN    lactated Ringers infusion  75 mL/hr IntraVENous CONTINUOUS    Vancomycin- pharmacy to dose   Other Rx Dosing/Monitoring     ______________________________________________________________________  EXPECTED LENGTH OF STAY: - - -  ACTUAL LENGTH OF STAY:          1                 Mathew Bauer MD

## 2020-01-07 NOTE — WOUND CARE
Wound Care Note:     New consult placed by physician request for bilateral leg wounds    Chart shows:  Admitted for diabetic foot infection with history of bilateral lower extremity venous stasis, type 2 diabetes, HTN, and hearing impairment. Wound Culture obtained on 1/6/20 with pending results no wbc's seen, no organisms seen. WBC = 9.4 on 1/7/20  Admitted from [address] [facility]  [address]:   Patient is very hard of hearing, orientation could not be obtained, communicative, incontinent with moderate assistance needed in repositioning. Bed: Total Care  Patient wearing briefs for incontinence   Diet: Diabetic consistent carb regular  Patient reports no pain. Bilateral heels and buttocks skin intact and without erythema. Sacral skin intact with blanchable erythema. Faint palpable DP pulses bilaterally. 1. POA left lower leg red and angry, left lateral/posterior lower leg with partial thickness wound measuring 24 cm x 16 cm x 0.1 cm, small serous/sero/sang drainage, rachel-wound very red, increase in temperature, wound edges are open. Possible slight pseudomonas odor. Xeroform gauze, 4 x 4 and roll gauze applied. 2.  POA left dorsal foot with partial thickness wound measuring 5 cm x 5.5 cm x 0.1 cm, wound bed is red, small serous drainage, wound edges are open, rachel-wound edematous and erythema. Xeroform gauze, 4 x 4 and roll gauze applied. 3.  POA base of toes with white areas that appear to be blisters forming, no drainage, skin is taught, shiny, and in other areas on toes just red. Open area noted on left posterior toe at base, approximately 0.1 cm x 0.3 cm x 0.1. Dr. Carl Gant has been consulted; wound care will defer to her. 4.  POA right lower leg with a few superficial crusted areas. Lower leg with some pink discoloration. Does not appear to be cellulitis. Left open to air.     Spoke with Dr. Brandon Razo, wound care orders obtained    Patient repositioned on right side. Heels offloaded on pillows. Recommendations:    Left lower leg- Every other day cleanse with normal saline, wipe wound bed clean and dry, apply a piece of Xeroform gauze that has been folded in half, cover with 4 x 4's, secure with roll gauze and tape. Sacrum and bilateral heels- Every 8 hours liberally apply Venelex ointment. Skin Care & Pressure Prevention:  Minimize layers of linen/pads under patient to optimize support surface. Turn/reposition approximately every 2 hours and offload heels.   Manage incontinence / promote continence   Nourishing Skin Cream to dry skin, minimize use of briefs when able    Discussed above plan with patient & JIE Zacarias    Transition of Care: Plan to follow as needed while admitted to hospital.    Elspeth Pancake" Sandie Ganser, BSN, RN, [facility], [facility].  office [phone]  pager [phone] or call  to page

## 2020-01-08 ENCOUNTER — APPOINTMENT (OUTPATIENT)
Dept: GENERAL RADIOLOGY | Age: 70
DRG: 638 | End: 2020-01-08
Attending: NEUROMUSCULOSKELETAL MEDICINE & OMM
Payer: MEDICARE

## 2020-01-08 ENCOUNTER — APPOINTMENT (OUTPATIENT)
Dept: CT IMAGING | Age: 70
DRG: 638 | End: 2020-01-08
Attending: NEUROMUSCULOSKELETAL MEDICINE & OMM
Payer: MEDICARE

## 2020-01-08 LAB
ANION GAP SERPL CALC-SCNC: 6 MMOL/L (ref 5–15)
BUN SERPL-MCNC: 11 MG/DL (ref 6–20)
BUN/CREAT SERPL: 12 (ref 12–20)
CALCIUM SERPL-MCNC: 8 MG/DL (ref 8.5–10.1)
CHLORIDE SERPL-SCNC: 97 MMOL/L (ref 97–108)
CO2 SERPL-SCNC: 28 MMOL/L (ref 21–32)
CREAT SERPL-MCNC: 0.93 MG/DL (ref 0.7–1.3)
GLUCOSE BLD STRIP.AUTO-MCNC: 259 MG/DL (ref 65–100)
GLUCOSE BLD STRIP.AUTO-MCNC: 266 MG/DL (ref 65–100)
GLUCOSE BLD STRIP.AUTO-MCNC: 283 MG/DL (ref 65–100)
GLUCOSE BLD STRIP.AUTO-MCNC: 285 MG/DL (ref 65–100)
GLUCOSE SERPL-MCNC: 257 MG/DL (ref 65–100)
POTASSIUM SERPL-SCNC: 4.2 MMOL/L (ref 3.5–5.1)
SERVICE CMNT-IMP: ABNORMAL
SODIUM SERPL-SCNC: 131 MMOL/L (ref 136–145)

## 2020-01-08 PROCEDURE — 82962 GLUCOSE BLOOD TEST: CPT

## 2020-01-08 PROCEDURE — 74011250636 HC RX REV CODE- 250/636: Performed by: INTERNAL MEDICINE

## 2020-01-08 PROCEDURE — 74011636637 HC RX REV CODE- 636/637: Performed by: NEUROMUSCULOSKELETAL MEDICINE & OMM

## 2020-01-08 PROCEDURE — 65270000029 HC RM PRIVATE

## 2020-01-08 PROCEDURE — 80048 BASIC METABOLIC PNL TOTAL CA: CPT

## 2020-01-08 PROCEDURE — 74011250636 HC RX REV CODE- 250/636: Performed by: NEUROMUSCULOSKELETAL MEDICINE & OMM

## 2020-01-08 PROCEDURE — 74011636637 HC RX REV CODE- 636/637: Performed by: INTERNAL MEDICINE

## 2020-01-08 PROCEDURE — 74011250637 HC RX REV CODE- 250/637: Performed by: NURSE PRACTITIONER

## 2020-01-08 PROCEDURE — 74011000258 HC RX REV CODE- 258: Performed by: INTERNAL MEDICINE

## 2020-01-08 PROCEDURE — 36415 COLL VENOUS BLD VENIPUNCTURE: CPT

## 2020-01-08 PROCEDURE — 77030038269 HC DRN EXT URIN PURWCK BARD -A

## 2020-01-08 PROCEDURE — 71045 X-RAY EXAM CHEST 1 VIEW: CPT

## 2020-01-08 PROCEDURE — 74011250637 HC RX REV CODE- 250/637: Performed by: HOSPITALIST

## 2020-01-08 PROCEDURE — 74011250637 HC RX REV CODE- 250/637: Performed by: INTERNAL MEDICINE

## 2020-01-08 RX ORDER — INSULIN GLARGINE 100 [IU]/ML
22 INJECTION, SOLUTION SUBCUTANEOUS EVERY EVENING
Status: DISCONTINUED | OUTPATIENT
Start: 2020-01-08 | End: 2020-01-09

## 2020-01-08 RX ORDER — HYDROXYZINE 25 MG/1
50 TABLET, FILM COATED ORAL ONCE
Status: COMPLETED | OUTPATIENT
Start: 2020-01-08 | End: 2020-01-08

## 2020-01-08 RX ORDER — SODIUM CHLORIDE 0.9 % (FLUSH) 0.9 %
10 SYRINGE (ML) INJECTION
Status: DISCONTINUED | OUTPATIENT
Start: 2020-01-08 | End: 2020-01-09

## 2020-01-08 RX ORDER — FUROSEMIDE 10 MG/ML
40 INJECTION INTRAMUSCULAR; INTRAVENOUS EVERY 12 HOURS
Status: COMPLETED | OUTPATIENT
Start: 2020-01-08 | End: 2020-01-09

## 2020-01-08 RX ADMIN — HEPARIN SODIUM 5000 UNITS: 5000 INJECTION INTRAVENOUS; SUBCUTANEOUS at 15:26

## 2020-01-08 RX ADMIN — Medication 10 ML: at 21:20

## 2020-01-08 RX ADMIN — MELATONIN 4.5 MG: at 21:19

## 2020-01-08 RX ADMIN — CASTOR OIL AND BALSAM, PERU: 788; 87 OINTMENT TOPICAL at 06:43

## 2020-01-08 RX ADMIN — VANCOMYCIN HYDROCHLORIDE 1750 MG: 10 INJECTION, POWDER, LYOPHILIZED, FOR SOLUTION INTRAVENOUS at 03:24

## 2020-01-08 RX ADMIN — LISINOPRIL 20 MG: 10 TABLET ORAL at 08:41

## 2020-01-08 RX ADMIN — PIPERACILLIN AND TAZOBACTAM 3.38 G: 3; .375 INJECTION, POWDER, FOR SOLUTION INTRAVENOUS at 03:24

## 2020-01-08 RX ADMIN — INSULIN LISPRO 5 UNITS: 100 INJECTION, SOLUTION INTRAVENOUS; SUBCUTANEOUS at 12:38

## 2020-01-08 RX ADMIN — INSULIN GLARGINE 22 UNITS: 100 INJECTION, SOLUTION SUBCUTANEOUS at 22:33

## 2020-01-08 RX ADMIN — INSULIN LISPRO 5 UNITS: 100 INJECTION, SOLUTION INTRAVENOUS; SUBCUTANEOUS at 19:23

## 2020-01-08 RX ADMIN — DOCUSATE SODIUM 100 MG: 100 CAPSULE, LIQUID FILLED ORAL at 08:42

## 2020-01-08 RX ADMIN — HYDROXYZINE HYDROCHLORIDE 50 MG: 25 TABLET, FILM COATED ORAL at 21:19

## 2020-01-08 RX ADMIN — INSULIN LISPRO 5 UNITS: 100 INJECTION, SOLUTION INTRAVENOUS; SUBCUTANEOUS at 06:41

## 2020-01-08 RX ADMIN — CASTOR OIL AND BALSAM, PERU: 788; 87 OINTMENT TOPICAL at 22:33

## 2020-01-08 RX ADMIN — MAGNESIUM OXIDE TAB 400 MG (241.3 MG ELEMENTAL MG) 400 MG: 400 (241.3 MG) TAB at 08:42

## 2020-01-08 RX ADMIN — PIPERACILLIN AND TAZOBACTAM 3.38 G: 3; .375 INJECTION, POWDER, FOR SOLUTION INTRAVENOUS at 21:20

## 2020-01-08 RX ADMIN — VANCOMYCIN HYDROCHLORIDE 1750 MG: 10 INJECTION, POWDER, LYOPHILIZED, FOR SOLUTION INTRAVENOUS at 22:32

## 2020-01-08 RX ADMIN — VITAM B12 100 MCG: 100 TAB at 08:42

## 2020-01-08 RX ADMIN — PIPERACILLIN AND TAZOBACTAM 3.38 G: 3; .375 INJECTION, POWDER, FOR SOLUTION INTRAVENOUS at 11:49

## 2020-01-08 RX ADMIN — METOPROLOL SUCCINATE 100 MG: 50 TABLET, EXTENDED RELEASE ORAL at 08:41

## 2020-01-08 RX ADMIN — HEPARIN SODIUM 5000 UNITS: 5000 INJECTION INTRAVENOUS; SUBCUTANEOUS at 22:32

## 2020-01-08 RX ADMIN — CASTOR OIL AND BALSAM, PERU: 788; 87 OINTMENT TOPICAL at 15:26

## 2020-01-08 RX ADMIN — FUROSEMIDE 40 MG: 10 INJECTION, SOLUTION INTRAMUSCULAR; INTRAVENOUS at 21:19

## 2020-01-08 RX ADMIN — HEPARIN SODIUM 5000 UNITS: 5000 INJECTION INTRAVENOUS; SUBCUTANEOUS at 06:41

## 2020-01-08 RX ADMIN — SERTRALINE HYDROCHLORIDE 25 MG: 50 TABLET ORAL at 21:19

## 2020-01-08 RX ADMIN — FERROUS SULFATE TAB 325 MG (65 MG ELEMENTAL FE) 325 MG: 325 (65 FE) TAB at 08:42

## 2020-01-08 RX ADMIN — Medication 1 CAPSULE: at 08:41

## 2020-01-08 NOTE — PROGRESS NOTES
Bedside and Verbal shift change report given to Ann Trevizo (oncoming nurse) by Bert Garcia (offgoing nurse). Report included the following information SBAR, Kardex, MAR and Recent Results.

## 2020-01-08 NOTE — PROGRESS NOTES
Foot and Ankle Progress Note    DOS: 1/7/20  Admit Date: 1/6/2020  Hospital day 7    Subjective:     Patien was admitted one day ago with cellulitis and multiple excoriations left lower leg.     Current Facility-Administered Medications   Medication Dose Route Frequency    insulin glargine (LANTUS) injection 22 Units  22 Units SubCUTAneous QPM    vancomycin (VANCOCIN) 1750 mg in  ml infusion  1,750 mg IntraVENous Q16H    ferrous sulfate tablet 325 mg  1 Tab Oral DAILY WITH BREAKFAST    cyanocobalamin (VITAMIN B12) tablet 100 mcg  100 mcg Oral DAILY    albuterol-ipratropium (DUO-NEB) 2.5 MG-0.5 MG/3 ML  3 mL Nebulization Q6H PRN    balsam peru-castor oil (VENELEX) ointment   Topical Q8H    docusate sodium (COLACE) capsule 100 mg  100 mg Oral DAILY    metoprolol succinate (TOPROL-XL) XL tablet 100 mg  100 mg Oral DAILY    sertraline (ZOLOFT) tablet 25 mg  25 mg Oral QHS    melatonin tablet 4.5 mg  4.5 mg Oral QHS    loperamide (IMODIUM) capsule 2 mg  2 mg Oral Q6H PRN    lisinopril (PRINIVIL, ZESTRIL) tablet 20 mg  20 mg Oral DAILY    magnesium oxide (MAG-OX) tablet 400 mg  400 mg Oral DAILY    sodium chloride (NS) flush 5-40 mL  5-40 mL IntraVENous Q8H    sodium chloride (NS) flush 5-40 mL  5-40 mL IntraVENous PRN    acetaminophen (TYLENOL) tablet 650 mg  650 mg Oral Q4H PRN    heparin (porcine) injection 5,000 Units  5,000 Units SubCUTAneous Q8H    ondansetron (ZOFRAN) injection 4 mg  4 mg IntraVENous Q4H PRN    lactobac ac& pc-s.therm-b.anim (VITOR Q/RISAQUAD)  1 Cap Oral DAILY    piperacillin-tazobactam (ZOSYN) 3.375 g in 0.9% sodium chloride (MBP/ADV) 100 mL  3.375 g IntraVENous Q8H    insulin lispro (HUMALOG) injection   SubCUTAneous AC&HS    glucose chewable tablet 16 g  4 Tab Oral PRN    glucagon (GLUCAGEN) injection 1 mg  1 mg IntraMUSCular PRN    dextrose 10% infusion 0-250 mL  0-250 mL IntraVENous PRN    lactated Ringers infusion  75 mL/hr IntraVENous CONTINUOUS    Vancomycin- pharmacy to dose   Other Rx Dosing/Monitoring        Review of Systems  Reviewed admitting H&P    Objective:     Patient Vitals for the past 8 hrs:   BP Temp Pulse Resp SpO2 Height   01/08/20 1451 149/73 99.2 °F (37.3 °C) 77 26 (!) 86 %    01/08/20 1006      5' 10.98\" (1.803 m)   01/08/20 0833 139/78 98.6 °F (37 °C) 86 18       No intake/output data recorded. No intake/output data recorded.     Physical Exam: LOWER EXTREMITIES  barley palpable pedal pulses with diminished epicritic sensation  Lower left leg and foot bright red with multiple excoriations lower left leg; mild warm  Edema bilateral lower legs      WBC's with in normal    Data Review   Recent Results (from the past 24 hour(s))   GLUCOSE, POC    Collection Time: 01/07/20  4:29 PM   Result Value Ref Range    Glucose (POC) 340 (H) 65 - 100 mg/dL    Performed by Raymundo Vyas (VENECIA)    GLUCOSE, POC    Collection Time: 01/07/20  9:12 PM   Result Value Ref Range    Glucose (POC) 389 (H) 65 - 100 mg/dL    Performed by Parmjit Montoya    METABOLIC PANEL, BASIC    Collection Time: 01/08/20  3:36 AM   Result Value Ref Range    Sodium 131 (L) 136 - 145 mmol/L    Potassium 4.2 3.5 - 5.1 mmol/L    Chloride 97 97 - 108 mmol/L    CO2 28 21 - 32 mmol/L    Anion gap 6 5 - 15 mmol/L    Glucose 257 (H) 65 - 100 mg/dL    BUN 11 6 - 20 MG/DL    Creatinine 0.93 0.70 - 1.30 MG/DL    BUN/Creatinine ratio 12 12 - 20      GFR est AA >60 >60 ml/min/1.73m2    GFR est non-AA >60 >60 ml/min/1.73m2    Calcium 8.0 (L) 8.5 - 10.1 MG/DL   GLUCOSE, POC    Collection Time: 01/08/20  6:18 AM   Result Value Ref Range    Glucose (POC) 259 (H) 65 - 100 mg/dL    Performed by Robbie Lauren, POC    Collection Time: 01/08/20 12:06 PM   Result Value Ref Range    Glucose (POC) 283 (H) 65 - 100 mg/dL    Performed by Nilsa Murillo            Assessment:     Principal Problem:    Venous insufficiency of both lower extremities (1/7/2020)    Active Problems:    Diabetic foot infection ([facility] (1/6/2020)      Cellulitis of left lower leg (1/7/2020)        Plan:   I will request infectious disease consult and refer his treatment to infectious disease for this  this patient. There is little benefit podiatry services can offer this patient for his chronic cellulitis. Otherwise I recommend to continue with current wound care orders.

## 2020-01-08 NOTE — CONSULTS
Infectious Disease Consult Note    Reason for Consult: Left lower extremity cellulitis  Date of Consultation: January 8, 2020  Date of Admission: 1/6/2020  Referring Physician: Dr. Caryn Reeder      HPI:      Unable to obtain history from the patient  From chart review, Mr. Maximo Hernandez is a 70-year-old gentleman with a history of diabetes, hypertension, hearing impairment venous stasis of lower extremities admitted on 1/6/2020 with worsening lower extremity wounds. He is very hard of hearing and I used a marker in the board to write and communicate with him. When asked about his symptoms he says I do not know . He says I do not know when I asked him how long he has had these  leg findings. He repeatedly screams in the room that he wants to go home and keeps asking when he can go home. From chart review, he was admitted from 12/4 to 12/11/2019 and the   discharge summary ndicates he had cellulitis of the left lower extremity. It is also noted that he was found to have bilateral venous stasis on the last discharge summary. He was treated with ceftezole and and then transition to oral Keflex. His blood cultures on last admission were negative. This admission he has blood cultures pending. Wound cultures that are unclear to me how this was obtained is preliminary positive for MRSA. He has been started on vancomycin and Zosyn IV. Podiatry has been consulted . On labs, he has a normal white count, anemia and normal renal function and LFTs. He has not had any imaging this admission  . I am consulted for antibiotic recommendations      Patient is noted to cough intermittently and  when asked about his symptoms he says I do not know. Past Medical History:   Per chart review diabetes, hypertension, venous stasis      Surgical History:  None noted.   Chart review    Family History:   None noted per chart review    Social History:   Patient is from [address] [place] [address]   He denies smoking drugs or alcohol  Otherwise unable to obtain rest of the history from      Allergies:  No Known Allergies      Review of Systems   Unable to obtain from patient who is very hard of hearing     Medications   No current facility-administered medications on file prior to encounter. Current Outpatient Medications on File Prior to Encounter   Medication Sig Dispense Refill    bumetanide (BUMEX) 1 mg tablet Take 1 Tab by mouth daily. 30 Tab 0    insulin glargine (LANTUS,BASAGLAR) 100 unit/mL (3 mL) inpn 10 Units by SubCUTAneous route every evening.  docusate sodium (COLACE) 100 mg capsule Take 100 mg by mouth daily.  lisinopril (PRINIVIL, ZESTRIL) 20 mg tablet Take 20 mg by mouth daily.  magnesium oxide (MAG-OX) 400 mg tablet Take 400 mg by mouth daily.  melatonin 5 mg cap capsule Take 5 mg by mouth nightly.  metFORMIN (GLUCOPHAGE) 1,000 mg tablet Take 1,000 mg by mouth two (2) times daily (with meals).  metoprolol succinate (TOPROL-XL) 100 mg tablet Take 100 mg by mouth daily.  insulin aspart U-100 (NOVOLOG U-100 INSULIN ASPART) 100 unit/mL injection by SubCUTAneous route. Sliding scale:  -250 = 4 units  -300 = 6 units  -350 = 8 units  BS > 351 = 10 units      sertraline (ZOLOFT) 25 mg tablet Take 25 mg by mouth nightly.            Current Facility-Administered Medications:     insulin glargine (LANTUS) injection 22 Units, 22 Units, SubCUTAneous, QPM, Des Copeland DO    vancomycin (VANCOCIN) 1750 mg in  ml infusion, 1,750 mg, IntraVENous, Q16H, Adilene Rangel MD, Last Rate: 250 mL/hr at 01/08/20 0324, 1,750 mg at 01/08/20 0324    ferrous sulfate tablet 325 mg, 1 Tab, Oral, DAILY WITH BREAKFAST, Josh Hernandez MD, 325 mg at 01/08/20 7255    cyanocobalamin (VITAMIN B12) tablet 100 mcg, 100 mcg, Oral, DAILY, Josh Hernandez MD, 100 mcg at 01/08/20 0842    albuterol-ipratropium (DUO-NEB) 2.5 MG-0.5 MG/3 ML, 3 mL, Nebulization, Q6H PRN, Josh Mullen, MD    balsam peru-castor oil (VENELEX) ointment, , Topical, Q8H, Karime Whitmore MD    docusate sodium (COLACE) capsule 100 mg, 100 mg, Oral, DAILY, Adilene Rangel MD, 100 mg at 01/08/20 0842    metoprolol succinate (TOPROL-XL) XL tablet 100 mg, 100 mg, Oral, DAILY, Adilene Rangel MD, 100 mg at 01/08/20 0841    sertraline (ZOLOFT) tablet 25 mg, 25 mg, Oral, QHS, Adilene Rangel MD, 25 mg at 01/07/20 2224    melatonin tablet 4.5 mg, 4.5 mg, Oral, QHS, Adilene Rangel MD, 4.5 mg at 01/07/20 2224    loperamide (IMODIUM) capsule 2 mg, 2 mg, Oral, Q6H PRN, Adilene Rangel MD    lisinopril (PRINIVIL, ZESTRIL) tablet 20 mg, 20 mg, Oral, DAILY, Adilene Rangel MD, 20 mg at 01/08/20 0841    magnesium oxide (MAG-OX) tablet 400 mg, 400 mg, Oral, DAILY, Adilene Rangel MD, 400 mg at 01/08/20 0842    sodium chloride (NS) flush 5-40 mL, 5-40 mL, IntraVENous, Q8H, Adilene Rangel MD, 10 mL at 01/07/20 1709    sodium chloride (NS) flush 5-40 mL, 5-40 mL, IntraVENous, PRN, Adilene Rangel MD    acetaminophen (TYLENOL) tablet 650 mg, 650 mg, Oral, Q4H PRN, Adilene Rangel MD    heparin (porcine) injection 5,000 Units, 5,000 Units, SubCUTAneous, Q8H, Adilene Rangel MD, 5,000 Units at 01/08/20 0641    ondansetron (ZOFRAN) injection 4 mg, 4 mg, IntraVENous, Q4H PRN, Adilene Rangel MD    lactobac ac& pc-s.therm-b.anim (VITOR Q/RISAQUAD), 1 Cap, Oral, DAILY, Adilene Rangel MD, 1 Cap at 01/08/20 0841    piperacillin-tazobactam (ZOSYN) 3.375 g in 0.9% sodium chloride (MBP/ADV) 100 mL, 3.375 g, IntraVENous, Q8H, Adilene Rangel MD, Last Rate: 200 mL/hr at 01/08/20 1149, 3.375 g at 01/08/20 1149    insulin lispro (HUMALOG) injection, , SubCUTAneous, AC&HS, Adilene Rangel MD, 5 Units at 01/08/20 1238    glucose chewable tablet 16 g, 4 Tab, Oral, PRN, Adilene Rangel MD    glucagon (GLUCAGEN) injection 1 mg, 1 mg, IntraMUSCular, PRN, Adilene Rangel MD    dextrose 10% infusion 0-250 mL, 0-250 mL, IntraVENous, PRN, Adilene Rangel MD    lactated Ringers infusion, 75 mL/hr, IntraVENous, CONTINUOUS, Adilene Rangel MD, Last Rate: 75 mL/hr at 01/07/20 0027, 75 mL/hr at 01/07/20 0027    Vancomycin- pharmacy to dose, , Other, Rx Dosing/Monitoring, Adilene Rangel MD      Physical Exam:    Vitals:   Patient Vitals for the past 24 hrs:   Temp Pulse Resp BP SpO2   01/08/20 0833 98.6 °F (37 °C) 86 18 139/78 93 %   01/08/20 0338 98.3 °F (36.8 °C) 77 18 117/52 92 %   01/07/20 2100 98.9 °F (37.2 °C) 78 18 152/77 93 %   01/07/20 1509 97.4 °F (36.3 °C) 78 18 144/64    ·   · GEN: NAD  · HEENT:  no scleral icterus,  no thrush  · CV: S1, S2 heard regularly,   · Lungs: Clear to auscultation bilaterally  · Abdomen: soft, non distended, non tender  · Extremities: Mild edema left lower extremity  · Neuro: Alert to self , verbal    · Skin: Left lower extremity erythema warmth superficial skin tear, excoriations noted, dry open skin in certain areas noted   musculoskeletal nontender to palpation of ankles knees      Labs:   Recent Results (from the past 24 hour(s))   GLUCOSE, POC    Collection Time: 01/07/20  4:29 PM   Result Value Ref Range    Glucose (POC) 340 (H) 65 - 100 mg/dL    Performed by Renu Nogueira (CON)    GLUCOSE, POC    Collection Time: 01/07/20  9:12 PM   Result Value Ref Range    Glucose (POC) 389 (H) 65 - 100 mg/dL    Performed by Marla Ellsworth    METABOLIC PANEL, BASIC    Collection Time: 01/08/20  3:36 AM   Result Value Ref Range    Sodium 131 (L) 136 - 145 mmol/L    Potassium 4.2 3.5 - 5.1 mmol/L    Chloride 97 97 - 108 mmol/L    CO2 28 21 - 32 mmol/L    Anion gap 6 5 - 15 mmol/L    Glucose 257 (H) 65 - 100 mg/dL    BUN 11 6 - 20 MG/DL    Creatinine 0.93 0.70 - 1.30 MG/DL    BUN/Creatinine ratio 12 12 - 20      GFR est AA >60 >60 ml/min/1.73m2    GFR est non-AA >60 >60 ml/min/1.73m2    Calcium 8.0 (L) 8.5 - 10.1 MG/DL   GLUCOSE, POC    Collection Time: 01/08/20  6:18 AM   Result Value Ref Range    Glucose (POC) 259 (H) 65 - 100 mg/dL    Performed by ABHIJEET Wood    Collection Time: 01/08/20 12:06 PM   Result Value Ref Range    Glucose (POC) 283 (H) 65 - 100 mg/dL    Performed by Yogi Encinas        Microbiology Data:      Wound 1/7/20  Specimen Information: Wound Drainage        Component Value Ref Range & Units Status   Special Requests: NO SPECIAL REQUESTS    Preliminary   GRAM STAIN NO WBC'S SEEN    Preliminary   GRAM STAIN NO ORGANISMS SEEN    Preliminary   Culture result: Abnormal     Preliminary   LIGHT   Preliminary report of Methicillin Resistant Staphylococcus aureus by antigen detection. Confirmation and Sensitivities to follow. Blood 1/6/20       Component Value Ref Range & Units Status   Special Requests: NO SPECIAL REQUESTS    Preliminary   Culture result: NO GROWTH 2 DAYS    Preliminary   Result History       12/4/19  Specimen Information: Blood        Component Value Ref Range & Units Status   Special Requests: NO SPECIAL REQUESTS    Final   Culture result: NO GROWTH 5 DAYS    Final   Result History               Pathology Results:    Imaging:   TTE 12/8/19  Left Ventricle Normal cavity size and systolic function (ejection fraction normal). Mild concentric hypertrophy. The estimated ejection fraction is 61 - 65%. Visually measured ejection fraction. Unable to assess diastolic function. Wall Scoring The left ventricular wall motion is normal.            Left Atrium Normal cavity size. Right Ventricle Normal cavity size and global systolic function. Right Atrium Right atrium not well visualized. Interatrial Septum Interatrial septum not well visualized   Aortic Valve Aortic valve not well visualized. No regurgitation. Mitral Valve Mitral valve not well visualized. No stenosis and no regurgitation. Tricuspid Valve Tricuspid valve not well visualized. Trace tricuspid valve regurgitation. Mild pulmonary hypertension.    Pulmonic Valve Pulmonic valve not well visualized. No regurgitation. Aorta Normal aortic root. Pericardium Trivial-to-small posterior pericardial effusion, The effusion measures 12 mm. No indications of tamponade present       CXR 12/4/19  FINDINGS: A portable AP radiograph of the chest was obtained at 1835 hours. There is no consolidation or pulmonary edema. There is no pneumothorax or  pleural effusion. Cardiac size is mildly enlarged. There is no mediastinal or  hilar enlargement is evident. The bones are mildly osteopenic.      IMPRESSION  IMPRESSION: No acute findings. Duplex Lower extremity 12/4/19  · No evidence of acute deep vein thrombosis in the right common femoral, superficial femoral, popliteal, posterior tibial, and peroneal veins. The veins were imaged in the transverse and longitudinal planes. The vessels showed normal color filling and compressibility. Doppler interrogation showed phasic and spontaneous flow. · No evidence of acute deep vein thrombosis in the left common femoral, superficial femoral, popliteal, posterior tibial, and peroneal veins. The veins were imaged in the transverse and longitudinal planes. The vessels showed normal color filling and compressibility. Doppler interrogation showed phasic and spontaneous flow. Assessment / Plan:     Mr. Alexis Rausch is a 70-year-old gentleman with a history of diabetes, hypertension, hearing impairment venous stasis of lower extremities admitted on 1/6/2020 with worsening lower extremity wounds. From chart review, he was admitted from 12/4 to 12/11/2019 and the   discharge summary ndicates he had cellulitis of the left lower extremity. It is also noted that he was found to have bilateral venous stasis on the last discharge summary. He was treated with ceftezole and and then transition to oral Keflex. His blood cultures on last admission were negative. This admission he has blood cultures pending.   Wound cultures that are unclear to me how this was obtained is preliminary positive for MRSA. He has been started on vancomycin and Zosyn IV. Podiatry has been consulted . On labs, he has a normal white count, anemia and normal renal function and LFTs. He has not had any imaging this admission  . 1 left lower extremity cellulitis  has multiple superficial open skin areas that could service ports of entry  Currently on vancomycin IV and Zosyn IV  Monitor renal function very closely  Elevation of the leg emphasized  Recommend imaging to ensure no underlying abscess, blood clots  Podiatry  been consulted  Needs good wound care  Plan to de-escalate antibiotics based on improvement    2) cough  Recommend getting a chest x-ray  Already on IV antibiotics  Question aspiration risk    3) diabetes     4) hypertension    5) hearing impairment    6) DVT prophylaxis      Thank for the opportunity to participate in the care of this patient. Please contact with questions or concerns.        Rhonda Mahmood DO  1:42 PM

## 2020-01-08 NOTE — PROGRESS NOTES
Hospitalist Progress Note    NAME: Allyssa Sharp   :  1950   MRN:  [id number]       Assessment / Plan:  1.  Left diabetic foot infection:  -  continue with iv vancomycin and Zosyn. elevation  -   pt agreeable to ct leg and cxr     2.  Bilateral lower extremity venous stasis:    - Wound Care consult will be requested for local care.       3.  Uncontrolled Type 2 diabetes with hyperglycemia:    - The patient will be placed on sliding scale with insulin coverage.    - increase lantus from 15 to 22   check A1C      4.  Morbid obesity:  Dietary consult will be requested for dietary counseling.     5.  Hypertension:  We will continue with preadmission medication.     6.  Hyponatremia:  This is most likely due to volume depletion.  improved     7.  Anemia iron def    Start iron supplement     8.  Hearing impairment:  The patient will continue with hearing aids and supportive therapy.     Code status: Full  DVT prophylaxis: [address] discussed with: Patient/Family and Nurse  Disposition: TBD      30.0 - 39.9 Obese / Body mass index is 32.35 kg/m². Subjective:     Chief Complaint / Reason for Physician Visit  \"no pain or chills\". Discussed with RN events overnight. Review of Systems:  Symptom Y/N Comments  Symptom Y/N Comments   Fever/Chills    Chest Pain     Poor Appetite    Edema     Cough    Abdominal Pain     Sputum    Joint Pain     SOB/LOBATO    Pruritis/Rash     Nausea/vomit    Tolerating PT/OT     Diarrhea    Tolerating Diet     Constipation    Other       Could NOT obtain due to:      Objective:     VITALS:   Last 24hrs VS reviewed since prior progress note.  Most recent are:  Patient Vitals for the past 24 hrs:   Temp Pulse Resp BP SpO2   20 0338 98.3 °F (36.8 °C) 77 18 117/52 92 %   20 2100 98.9 °F (37.2 °C) 78 18 152/77 93 %   20 1509 97.4 °F (36.3 °C) 78 18 144/64    20 0933 98.4 °F (36.9 °C) 97 18 127/73 90 %     No intake or output data in the 24 hours ending 01/08/20 0835     PHYSICAL EXAM:  General: WD, WN. Alert, cooperative, no acute distress    EENT:  EOMI. Anicteric sclerae. MMM  Resp:  CTA bilaterally, no wheezing or rales. No accessory muscle use  CV:  Regular  rhythm,  No edema  GI:  Soft, Non distended, Non tender.  +Bowel sounds  Neurologic:  Alert and oriented X 3, normal speech,   Psych:   Good insight. Not anxious nor agitated  Skin:  No rashes. No jaundice    Reviewed most current lab test results and cultures  YES  Reviewed most current radiology test results   YES  Review and summation of old records today    NO  Reviewed patient's current orders and MAR    YES  PMH/ reviewed - no change compared to H&P  ________________________________________________________________________  Care Plan discussed with:    Comments   Patient     Family      RN     Care Manager     Consultant                        Multidiciplinary team rounds were held today with , nursing, pharmacist and clinical coordinator. Patient's plan of care was discussed; medications were reviewed and discharge planning was addressed. ________________________________________________________________________  Total NON critical care TIME:  20   Minutes    Total CRITICAL CARE TIME Spent:   Minutes non procedure based      Comments   >50% of visit spent in counseling and coordination of care     ________________________________________________________________________  Tiffanie Woodard,      Procedures: see electronic medical records for all procedures/Xrays and details which were not copied into this note but were reviewed prior to creation of Plan. LABS:  I reviewed today's most current labs and imaging studies.   Pertinent labs include:  Recent Labs     01/07/20  0514 01/06/20  1732   WBC 9.4 10.6   HGB 9.7* 10.8*   HCT 30.3* 35.1*    321     Recent Labs     01/08/20  0336 01/07/20  0456 01/06/20  1732   * 135* 130*   K 4.2 3.9 4.0   CL 97 100 93*   CO2 28 29 30   * 238* 365*   BUN 11 12 17   CREA 0.93 0.88 1.26   CA 8.0* 7.9* 8.0*   ALB  --  2.4* 2.8*   TBILI  --  0.3 0.2   SGOT  --  17 19   ALT  --  20 25       Signed: Naren Grewal, DO

## 2020-01-08 NOTE — PROGRESS NOTES
Contacted Dr. Venegas Members re: patient's low O2 saturation. Patient was at 2 L and O2 dropped back to 84%, so increased O2 to 3L and patient is at 90%. Patient appears to be using accessory muscles and has expiratory wheezing. Asked if Dr. Venegas Members could come and assess patient.

## 2020-01-08 NOTE — PROGRESS NOTES
Bedside and Verbal shift change report given to Chris Rivas RN (oncoming nurse) by Rocio Tavarez RN (offgoing nurse). Report included the following information SBAR, Kardex, Procedure Summary, Intake/Output, MAR, Accordion and Recent Results.

## 2020-01-08 NOTE — PROGRESS NOTES
From a  podiatry standpoint this patient can be discharged pending antibiotic therapy managed by infectious disease.     I will leave discharge wound care orders for case management

## 2020-01-08 NOTE — PROGRESS NOTES
JONELLE: Patient is from the [facility] The facility is coming to evaluate patient tomorrow to determine if he is appropriate to return to [facility]. ID and podiatry are following     CM received call from [facility],  at the [facility]. [facility] stated that the patient cannot return to their facility until they come to the hospital to evaluate the patient and ensure he is appropriate to return to [facility]. [facility] stated that she can come to evaluate patient tomorrow. She did indicate that they may recommend SNF placement. There are no therapy orders in the chart. Care management will follow.     JAMIE Hwang/ZACK

## 2020-01-09 ENCOUNTER — APPOINTMENT (OUTPATIENT)
Dept: CT IMAGING | Age: 70
DRG: 638 | End: 2020-01-09
Attending: NEUROMUSCULOSKELETAL MEDICINE & OMM
Payer: MEDICARE

## 2020-01-09 LAB
ANION GAP SERPL CALC-SCNC: 5 MMOL/L (ref 5–15)
BACTERIA SPEC CULT: ABNORMAL
BACTERIA SPEC CULT: ABNORMAL
BACTERIA SPEC CULT: NORMAL
BUN SERPL-MCNC: 9 MG/DL (ref 6–20)
BUN/CREAT SERPL: 9 (ref 12–20)
CALCIUM SERPL-MCNC: 8.5 MG/DL (ref 8.5–10.1)
CHLORIDE SERPL-SCNC: 92 MMOL/L (ref 97–108)
CO2 SERPL-SCNC: 32 MMOL/L (ref 21–32)
CREAT SERPL-MCNC: 0.99 MG/DL (ref 0.7–1.3)
DATE LAST DOSE: NORMAL
GLUCOSE BLD STRIP.AUTO-MCNC: 209 MG/DL (ref 65–100)
GLUCOSE BLD STRIP.AUTO-MCNC: 294 MG/DL (ref 65–100)
GLUCOSE BLD STRIP.AUTO-MCNC: 307 MG/DL (ref 65–100)
GLUCOSE BLD STRIP.AUTO-MCNC: 328 MG/DL (ref 65–100)
GLUCOSE SERPL-MCNC: 276 MG/DL (ref 65–100)
GRAM STN SPEC: ABNORMAL
GRAM STN SPEC: ABNORMAL
POTASSIUM SERPL-SCNC: 4.5 MMOL/L (ref 3.5–5.1)
REPORTED DOSE,DOSE: NORMAL UNITS
REPORTED DOSE/TIME,TMG: NORMAL
SERVICE CMNT-IMP: ABNORMAL
SERVICE CMNT-IMP: NORMAL
SODIUM SERPL-SCNC: 129 MMOL/L (ref 136–145)
VANCOMYCIN TROUGH SERPL-MCNC: 8.3 UG/ML (ref 5–10)

## 2020-01-09 PROCEDURE — 74011000258 HC RX REV CODE- 258: Performed by: INTERNAL MEDICINE

## 2020-01-09 PROCEDURE — 74011250636 HC RX REV CODE- 250/636: Performed by: INTERNAL MEDICINE

## 2020-01-09 PROCEDURE — 74011636637 HC RX REV CODE- 636/637: Performed by: INTERNAL MEDICINE

## 2020-01-09 PROCEDURE — 80048 BASIC METABOLIC PNL TOTAL CA: CPT

## 2020-01-09 PROCEDURE — 36415 COLL VENOUS BLD VENIPUNCTURE: CPT

## 2020-01-09 PROCEDURE — 74011250636 HC RX REV CODE- 250/636: Performed by: NEUROMUSCULOSKELETAL MEDICINE & OMM

## 2020-01-09 PROCEDURE — 94640 AIRWAY INHALATION TREATMENT: CPT

## 2020-01-09 PROCEDURE — 94664 DEMO&/EVAL PT USE INHALER: CPT

## 2020-01-09 PROCEDURE — 74011250637 HC RX REV CODE- 250/637: Performed by: INTERNAL MEDICINE

## 2020-01-09 PROCEDURE — 82962 GLUCOSE BLOOD TEST: CPT

## 2020-01-09 PROCEDURE — 74011250637 HC RX REV CODE- 250/637: Performed by: HOSPITALIST

## 2020-01-09 PROCEDURE — 74011000250 HC RX REV CODE- 250: Performed by: HOSPITALIST

## 2020-01-09 PROCEDURE — 80202 ASSAY OF VANCOMYCIN: CPT

## 2020-01-09 PROCEDURE — 65270000029 HC RM PRIVATE

## 2020-01-09 PROCEDURE — 74011636637 HC RX REV CODE- 636/637: Performed by: NEUROMUSCULOSKELETAL MEDICINE & OMM

## 2020-01-09 RX ORDER — VANCOMYCIN 1.75 GRAM/500 ML IN 0.9 % SODIUM CHLORIDE INTRAVENOUS
1750 EVERY 12 HOURS
Status: DISCONTINUED | OUTPATIENT
Start: 2020-01-10 | End: 2020-01-10

## 2020-01-09 RX ORDER — SODIUM CHLORIDE 0.9 % (FLUSH) 0.9 %
10 SYRINGE (ML) INJECTION
Status: DISPENSED | OUTPATIENT
Start: 2020-01-09 | End: 2020-01-09

## 2020-01-09 RX ORDER — INSULIN GLARGINE 100 [IU]/ML
30 INJECTION, SOLUTION SUBCUTANEOUS EVERY EVENING
Status: DISCONTINUED | OUTPATIENT
Start: 2020-01-09 | End: 2020-01-10

## 2020-01-09 RX ORDER — TRAZODONE HYDROCHLORIDE 100 MG/1
50 TABLET ORAL
Status: DISCONTINUED | OUTPATIENT
Start: 2020-01-09 | End: 2020-01-13 | Stop reason: HOSPADM

## 2020-01-09 RX ADMIN — FERROUS SULFATE TAB 325 MG (65 MG ELEMENTAL FE) 325 MG: 325 (65 FE) TAB at 09:18

## 2020-01-09 RX ADMIN — INSULIN LISPRO 5 UNITS: 100 INJECTION, SOLUTION INTRAVENOUS; SUBCUTANEOUS at 12:15

## 2020-01-09 RX ADMIN — HEPARIN SODIUM 5000 UNITS: 5000 INJECTION INTRAVENOUS; SUBCUTANEOUS at 23:30

## 2020-01-09 RX ADMIN — CASTOR OIL AND BALSAM, PERU: 788; 87 OINTMENT TOPICAL at 21:04

## 2020-01-09 RX ADMIN — Medication 1 CAPSULE: at 09:18

## 2020-01-09 RX ADMIN — INSULIN LISPRO 3 UNITS: 100 INJECTION, SOLUTION INTRAVENOUS; SUBCUTANEOUS at 17:55

## 2020-01-09 RX ADMIN — CASTOR OIL AND BALSAM, PERU: 788; 87 OINTMENT TOPICAL at 17:56

## 2020-01-09 RX ADMIN — MELATONIN 4.5 MG: at 21:04

## 2020-01-09 RX ADMIN — HEPARIN SODIUM 5000 UNITS: 5000 INJECTION INTRAVENOUS; SUBCUTANEOUS at 17:59

## 2020-01-09 RX ADMIN — MAGNESIUM OXIDE TAB 400 MG (241.3 MG ELEMENTAL MG) 400 MG: 400 (241.3 MG) TAB at 09:17

## 2020-01-09 RX ADMIN — VITAM B12 100 MCG: 100 TAB at 09:18

## 2020-01-09 RX ADMIN — IPRATROPIUM BROMIDE AND ALBUTEROL SULFATE 3 ML: .5; 3 SOLUTION RESPIRATORY (INHALATION) at 12:21

## 2020-01-09 RX ADMIN — INSULIN GLARGINE 30 UNITS: 100 INJECTION, SOLUTION SUBCUTANEOUS at 21:57

## 2020-01-09 RX ADMIN — SERTRALINE HYDROCHLORIDE 25 MG: 50 TABLET ORAL at 21:04

## 2020-01-09 RX ADMIN — LISINOPRIL 20 MG: 10 TABLET ORAL at 09:17

## 2020-01-09 RX ADMIN — HEPARIN SODIUM 5000 UNITS: 5000 INJECTION INTRAVENOUS; SUBCUTANEOUS at 07:09

## 2020-01-09 RX ADMIN — DOCUSATE SODIUM 100 MG: 100 CAPSULE, LIQUID FILLED ORAL at 09:18

## 2020-01-09 RX ADMIN — FUROSEMIDE 40 MG: 10 INJECTION, SOLUTION INTRAMUSCULAR; INTRAVENOUS at 09:17

## 2020-01-09 RX ADMIN — INSULIN LISPRO 7 UNITS: 100 INJECTION, SOLUTION INTRAVENOUS; SUBCUTANEOUS at 07:09

## 2020-01-09 RX ADMIN — CASTOR OIL AND BALSAM, PERU: 788; 87 OINTMENT TOPICAL at 07:10

## 2020-01-09 RX ADMIN — PIPERACILLIN AND TAZOBACTAM 3.38 G: 3; .375 INJECTION, POWDER, FOR SOLUTION INTRAVENOUS at 03:00

## 2020-01-09 RX ADMIN — VANCOMYCIN HYDROCHLORIDE 1750 MG: 10 INJECTION, POWDER, LYOPHILIZED, FOR SOLUTION INTRAVENOUS at 14:29

## 2020-01-09 RX ADMIN — PIPERACILLIN AND TAZOBACTAM 3.38 G: 3; .375 INJECTION, POWDER, FOR SOLUTION INTRAVENOUS at 19:56

## 2020-01-09 RX ADMIN — METOPROLOL SUCCINATE 100 MG: 50 TABLET, EXTENDED RELEASE ORAL at 09:17

## 2020-01-09 RX ADMIN — PIPERACILLIN AND TAZOBACTAM 3.38 G: 3; .375 INJECTION, POWDER, FOR SOLUTION INTRAVENOUS at 12:15

## 2020-01-09 RX ADMIN — INSULIN LISPRO 4 UNITS: 100 INJECTION, SOLUTION INTRAVENOUS; SUBCUTANEOUS at 21:57

## 2020-01-09 NOTE — PROGRESS NOTES
Physical Therapy  Referral received, chart reviewed and attempted to see patient for PT evaluation. Patient sleeping on arrival and unable to arouse. Attempted verbal and tactile stimuli but remains asleep. Evaluation aborted at this time and will continue to follow.    Karmen Pulido, PT, DPT

## 2020-01-09 NOTE — CDMP QUERY
Query 1 of 1    Pt admitted with LLE cellulitis. Pt noted to have DM Type 2 along with a left diabetic foot ulcer. If possible, please document in progress notes and discharge summary the relationship, if any, between cellulitis and DM.  Cellulitis due to Diabetes   Cellulitis unrelated to Diabetes   Other   Clinically unable to determine    The medical record reflects the following:    Risk Factors: diabetic foot ulcer, chronic venous stasis      Clinical Indicators:   Wound Care PN 1/7- \"POA left lower leg red and angry, left lateral/posterior lower leg with partial thickness wound measuring 24 cm x 16 cm x 0.1 cm, small serous/sero/sang drainage, rachel-wound very red, increase in temperature, wound edges are open\"; Podiatry PN 1/7- \"Poor hygiene palpable pedal pulses with diminished epicritic sensation Edematous bright red warm lower let leg with multiple excoriations\"; Hosp PN 1/7- \"Left diabetic foot infection\";    ID CN 1/8- \"left lower extremity cellulitis, has multiple superficial open skin areas that could service ports of entry\";       Treatment: ID consult; Podiatry consult; Vanc 1,750mg Q 16hrs IV; Zosyn 3.375g Q 8hrs IV; wound care; elevate leg    Thank you,    Ayaka Blue, RN, BSN, CCM  Clinical   [facility]  [phone]

## 2020-01-09 NOTE — PROGRESS NOTES
Hospitalist Progress Note    NAME: Jose Guevara   :  1950   MRN:  [id number]       Assessment / Plan:  1.  Left diabetic foot infection:  -  continue with vancomycin and Zosyn. elevation  -   pt didn't tolerate ct leg by report     2.  Bilateral lower extremity venous stasis:    - Wound Care consulted       3.  Uncontrolled Type 2 diabetes with hyperglycemia:    - The patient will be placed on sliding scale with insulin coverage.    - increase lantus from 22 to 30   check A1C      4.  Morbid obesity:  Dietary consult will be requested for dietary counseling.     5.  Hypertension:  We will continue with preadmission medication.     6.  Hyponatremia:  This is most likely due to volume depletion.  improved     7.  Anemia iron def    Start iron supplement     8.  Hearing impairment - pt is very upset in the hospital and wants to leave. Unclear at this time how long pt is willing to stay for treatment of cellulitis     Code status: Full  DVT [address] discussed with: Patient/Family and Nurse  Disposition: TBD   Cellulitis due to Diabetes      30.0 - 39.9 Obese / Body mass index is 32.35 kg/m².            Subjective:     Chief Complaint / Reason for Physician Visit  \"no reports of pain. Dyspnea overngith partially improved. \". Discussed with RN events overnight. Review of Systems:  Symptom Y/N Comments  Symptom Y/N Comments   Fever/Chills    Chest Pain     Poor Appetite    Edema     Cough    Abdominal Pain     Sputum    Joint Pain     SOB/LOBATO    Pruritis/Rash     Nausea/vomit    Tolerating PT/OT     Diarrhea    Tolerating Diet     Constipation    Other       Could NOT obtain due to:      Objective:     VITALS:   Last 24hrs VS reviewed since prior progress note.  Most recent are:  Patient Vitals for the past 24 hrs:   Temp Pulse Resp BP SpO2   20 0906 98.1 °F (36.7 °C) 67 18 142/72 91 %   20 0418 97.8 °F (36.6 °C) 76 20 112/70 95 %   20 2114 98.8 °F (37.1 °C) 65 20 148/71 95 %   01/08/20 1451 99.2 °F (37.3 °C) 77 26 149/73 (!) 86 %     No intake or output data in the 24 hours ending 01/09/20 1005     PHYSICAL EXAM:  General: WD, WN. Alert, cooperative, no acute distress    EENT:  EOMI. Anicteric sclerae. MMM  Resp:  CTA bilaterally, no wheezing or rales. No accessory muscle use  CV:  Regular  rhythm,  No edema  GI:  Soft, Non distended, Non tender.  +Bowel sounds  Neurologic:  Alert and oriented X 3, normal speech,   Psych:   Good insight. Not anxious nor agitated  Skin:  No rashes. No jaundice    Reviewed most current lab test results and cultures  YES  Reviewed most current radiology test results   YES  Review and summation of old records today    NO  Reviewed patient's current orders and MAR    YES  PMH/SH reviewed - no change compared to H&P  ________________________________________________________________________  Care Plan discussed with:    Comments   Patient     Family      RN     Care Manager     Consultant                        Multidiciplinary team rounds were held today with , nursing, pharmacist and clinical coordinator. Patient's plan of care was discussed; medications were reviewed and discharge planning was addressed. ________________________________________________________________________  Total NON critical care TIME:  20   Minutes    Total CRITICAL CARE TIME Spent:   Minutes non procedure based      Comments   >50% of visit spent in counseling and coordination of care     ________________________________________________________________________  Trey Batemanelor, DO     Procedures: see electronic medical records for all procedures/Xrays and details which were not copied into this note but were reviewed prior to creation of Plan. LABS:  I reviewed today's most current labs and imaging studies.   Pertinent labs include:  Recent Labs     01/07/20  0514 01/06/20  1732   WBC 9.4 10.6   HGB 9.7* 10.8*   HCT 30.3* 35.1*    321 Recent Labs     01/09/20  0431 01/08/20  0336 01/07/20  0456 01/06/20  1732   * 131* 135* 130*   K 4.5 4.2 3.9 4.0   CL 92* 97 100 93*   CO2 32 28 29 30   * 257* 238* 365*   BUN 9 11 12 17   CREA 0.99 0.93 0.88 1.26   CA 8.5 8.0* 7.9* 8.0*   ALB  --   --  2.4* 2.8*   TBILI  --   --  0.3 0.2   SGOT  --   --  17 19   ALT  --   --  20 25       Signed: Sanchez Berry DO

## 2020-01-09 NOTE — PROGRESS NOTES
Bedside shift change report given to Nawaf Trinidad (oncoming nurse) by Kahlil Ni (offgoing nurse). Report included the following information SBAR, Kardex, Intake/Output, MAR and Recent Results.

## 2020-01-09 NOTE — PROGRESS NOTES
Bedside and Verbal shift change report given to JIE Jarvis (oncoming nurse) by Ayesha Aguilar RN (offgoing nurse). Report included the following information SBAR, ED Summary, Procedure Summary, Intake/Output, Accordion and Recent Results.

## 2020-01-09 NOTE — PROGRESS NOTES
RAYMOND Munoz regarding patient's anxiety and agitation. Patient has pulled out IV line and breathing has been labored with low O2 history during the day. Per Farooq Swain will order Atarax for patient.

## 2020-01-09 NOTE — PROGRESS NOTES
Pharmacist Note - Vancomycin Dosing  Therapy day 3  Indication: DM Lt foot MRSA infection  Current regimen: 1750mg IV Q 16hrs    A Trough Level resulted at 8.3 mcg/mL which was obtained ~14 hrs post-dose. The extrapolated \"true\" trough is approximately 6.75 mcg/mL based on the patient's known kinetics. Goal trough: 10 - 15 mcg/mL       Plan: Change to 1750 mg IV Q 12hrs for predicted trough of ~11.5 mcg/mL . Pharmacy will continue to monitor this patient daily for changes in clinical status and renal function.     Tova Carbajal, PharmD  Clinical Pharmacist, Orthopedics and Med/Surg () [address] [facility] [phone]

## 2020-01-09 NOTE — PROGRESS NOTES
Infectious Disease Progress Note          HPI:    Mr Cyrus Nevarez seen   More calm today   Says \"I want to go home today\"  Denied pain   Not coughing much today   Denied nausea, vomiting, diarrhea, fever, chills    Meds    Current Facility-Administered Medications:     sodium chloride 0.9 % bolus infusion 100 mL, 100 mL, IntraVENous, RAD ONCE, Andrez Choudhary MD    sodium chloride (NS) flush 10 mL, 10 mL, IntraVENous, RAD ONCE, Andrez Choudhary MD    iopamidol (ISOVUE 300) 61 % contrast injection 100 mL, 100 mL, IntraVENous, RAD ONCE, Andrez Choudhary MD    insulin glargine (LANTUS) injection 30 Units, 30 Units, SubCUTAneous, QPM, Des Copeland DO    Vancomycin, Trough - Please draw IMMEDIATELY PRIOR to starting 1300 dose on Thursday, 1/09/2020.   Thanks!, , Other, ONCE, Adilene Rangel MD    vancomycin (VANCOCIN) 1750 mg in  ml infusion, 1,750 mg, IntraVENous, Q16H, Adilene Rangel MD, Last Rate: 250 mL/hr at 01/08/20 2232, 1,750 mg at 01/08/20 2232    ferrous sulfate tablet 325 mg, 1 Tab, Oral, DAILY WITH BREAKFAST, Anila Lam MD, 325 mg at 01/09/20 9186    cyanocobalamin (VITAMIN B12) tablet 100 mcg, 100 mcg, Oral, DAILY, Anila Lam MD, 100 mcg at 01/09/20 0918    albuterol-ipratropium (DUO-NEB) 2.5 MG-0.5 MG/3 ML, 3 mL, Nebulization, Q6H PRN, Anila Lam MD    balsam peru-castor oil (VENELEX) ointment, , Topical, Q8H, Josh Hernandez MD    docusate sodium (COLACE) capsule 100 mg, 100 mg, Oral, DAILY, Adilene Rangel MD, 100 mg at 01/09/20 0918    metoprolol succinate (TOPROL-XL) XL tablet 100 mg, 100 mg, Oral, DAILY, Adilene Rangel MD, 100 mg at 01/09/20 0917    sertraline (ZOLOFT) tablet 25 mg, 25 mg, Oral, QHS, Adilene Rangel MD, 25 mg at 01/08/20 2119    melatonin tablet 4.5 mg, 4.5 mg, Oral, QHS, Adilene Rangel MD, 4.5 mg at 01/08/20 2119    loperamide (IMODIUM) capsule 2 mg, 2 mg, Oral, Q6H PRN, Adilene Rangel MD    lisinopril (PRINIVIL, ZESTRIL) tablet 20 mg, 20 mg, Oral, DAILY, Adilene Rangel MD, 20 mg at 01/09/20 0917    magnesium oxide (MAG-OX) tablet 400 mg, 400 mg, Oral, DAILY, Adilene Rangel MD, 400 mg at 01/09/20 0917    sodium chloride (NS) flush 5-40 mL, 5-40 mL, IntraVENous, Q8H, Adilene Rangel MD, 10 mL at 01/08/20 2120    sodium chloride (NS) flush 5-40 mL, 5-40 mL, IntraVENous, PRN, Adilene Farrell MD    acetaminophen (TYLENOL) tablet 650 mg, 650 mg, Oral, Q4H PRN, Adilene Rangel MD    heparin (porcine) injection 5,000 Units, 5,000 Units, SubCUTAneous, Q8H, Adilene Rangel MD, 5,000 Units at 01/09/20 0709    ondansetron (ZOFRAN) injection 4 mg, 4 mg, IntraVENous, Q4H PRN, Adilene Rangel MD    lactobac ac& pc-s.therm-b.anim (VITOR Q/RISAQUAD), 1 Cap, Oral, DAILY, Adilene Rangel MD, 1 Cap at 01/09/20 0918    piperacillin-tazobactam (ZOSYN) 3.375 g in 0.9% sodium chloride (MBP/ADV) 100 mL, 3.375 g, IntraVENous, Q8H, Adilene Rangel MD, Last Rate: 200 mL/hr at 01/09/20 0300, 3.375 g at 01/09/20 0300    insulin lispro (HUMALOG) injection, , SubCUTAneous, AC&HS, Adilene Rangel MD, 7 Units at 01/09/20 0709    glucose chewable tablet 16 g, 4 Tab, Oral, PRN, Adilene Rangel MD    glucagon (GLUCAGEN) injection 1 mg, 1 mg, IntraMUSCular, PRN, Adilene Rangel MD    dextrose 10% infusion 0-250 mL, 0-250 mL, IntraVENous, PRN, Adilene Rangel MD    lactated Ringers infusion, 75 mL/hr, IntraVENous, CONTINUOUS, Adilene Rangel MD, Last Rate: 75 mL/hr at 01/07/20 0027, 75 mL/hr at 01/07/20 0027    Vancomycin- pharmacy to dose, , Other, Rx Dosing/Monitoring, Deon Dumont MD      Physical Exam:    Vitals:   Patient Vitals for the past 24 hrs:   Temp Pulse Resp BP SpO2   01/09/20 0906 98.1 °F (36.7 °C) 67 18 142/72 91 %   01/09/20 0418 97.8 °F (36.6 °C) 76 20 112/70 95 %   01/08/20 2114 98.8 °F (37.1 °C) 65 20 148/71 95 %   01/08/20 1451 99.2 °F (37.3 °C) 77 26 149/73 (!) 86 %     · GEN: NAD  · HEENT: no scleral icterus,  no thrush  · CV: S1, S2 heard regularly,   · Lungs: Clear to auscultation bilaterally  · Abdomen: soft, non distended, non tender  · Skin: excoriations noted LE  EXT/MSK:    Left lower extremity erythema and  Warmth improved   Chronic venous changes noted RLE   superficial skin tear, excoriations noted LLE, dry open skin in certain areas noted dorsal foot LLE   musculoskeletal nontender to palpation of ankles knees      Labs:   Recent Results (from the past 24 hour(s))   GLUCOSE, POC    Collection Time: 01/08/20 12:06 PM   Result Value Ref Range    Glucose (POC) 283 (H) 65 - 100 mg/dL    Performed by Dru Romo    GLUCOSE, POC    Collection Time: 01/08/20  4:18 PM   Result Value Ref Range    Glucose (POC) 285 (H) 65 - 100 mg/dL    Performed by [id number], POC    Collection Time: 01/08/20  9:25 PM   Result Value Ref Range    Glucose (POC) 266 (H) 65 - 100 mg/dL    Performed by [address] [place] [address], BASIC    Collection Time: 01/09/20  4:31 AM   Result Value Ref Range    Sodium 129 (L) 136 - 145 mmol/L    Potassium 4.5 3.5 - 5.1 mmol/L    Chloride 92 (L) 97 - 108 mmol/L    CO2 32 21 - 32 mmol/L    Anion gap 5 5 - 15 mmol/L    Glucose 276 (H) 65 - 100 mg/dL    BUN 9 6 - 20 MG/DL    Creatinine 0.99 0.70 - 1.30 MG/DL    BUN/Creatinine ratio 9 (L) 12 - 20      GFR est AA >60 >60 ml/min/1.73m2    GFR est non-AA >60 >60 ml/min/1.73m2    Calcium 8.5 8.5 - 10.1 MG/DL   GLUCOSE, POC    Collection Time: 01/09/20  6:51 AM   Result Value Ref Range    Glucose (POC) 307 (H) 65 - 100 mg/dL    Performed by Almas Strong, POC    Collection Time: 01/09/20 11:17 AM   Result Value Ref Range    Glucose (POC) 294 (H) 65 - 100 mg/dL    Performed by Yue Nunes        Microbiology Data:      Wound 1/7/20  Specimen Information: Wound Drainage        Component Value Ref Range & Units Status   Special Requests: NO SPECIAL REQUESTS    Preliminary   GRAM STAIN NO WBC'S SEEN    Preliminary   GRAM STAIN NO ORGANISMS SEEN    Preliminary   Culture result: Abnormal     Preliminary   LIGHT   Preliminary report of Methicillin Resistant Staphylococcus aureus by antigen detection. Confirmation and Sensitivities to follow. Blood 1/6/20       Component Value Ref Range & Units Status   Special Requests: NO SPECIAL REQUESTS    Preliminary   Culture result: NO GROWTH 2 DAYS    Preliminary   Result History       12/4/19  Specimen Information: Blood        Component Value Ref Range & Units Status   Special Requests: NO SPECIAL REQUESTS    Final   Culture result: NO GROWTH 5 DAYS    Final   Result History               Pathology Results:    Imaging:   TTE 12/8/19  Left Ventricle Normal cavity size and systolic function (ejection fraction normal). Mild concentric hypertrophy. The estimated ejection fraction is 61 - 65%. Visually measured ejection fraction. Unable to assess diastolic function. Wall Scoring The left ventricular wall motion is normal.            Left Atrium Normal cavity size. Right Ventricle Normal cavity size and global systolic function. Right Atrium Right atrium not well visualized. Interatrial Septum Interatrial septum not well visualized   Aortic Valve Aortic valve not well visualized. No regurgitation. Mitral Valve Mitral valve not well visualized. No stenosis and no regurgitation. Tricuspid Valve Tricuspid valve not well visualized. Trace tricuspid valve regurgitation. Mild pulmonary hypertension. Pulmonic Valve Pulmonic valve not well visualized. No regurgitation. Aorta Normal aortic root. Pericardium Trivial-to-small posterior pericardial effusion, The effusion measures 12 mm. No indications of tamponade present       CXR 12/4/19  FINDINGS: A portable AP radiograph of the chest was obtained at 1835 hours. There is no consolidation or pulmonary edema. There is no pneumothorax or  pleural effusion. Cardiac size is mildly enlarged.  There is no mediastinal or  hilar enlargement is evident. The bones are mildly osteopenic.      IMPRESSION  IMPRESSION: No acute findings. Duplex Lower extremity 12/4/19  · No evidence of acute deep vein thrombosis in the right common femoral, superficial femoral, popliteal, posterior tibial, and peroneal veins. The veins were imaged in the transverse and longitudinal planes. The vessels showed normal color filling and compressibility. Doppler interrogation showed phasic and spontaneous flow. · No evidence of acute deep vein thrombosis in the left common femoral, superficial femoral, popliteal, posterior tibial, and peroneal veins. The veins were imaged in the transverse and longitudinal planes. The vessels showed normal color filling and compressibility. Doppler interrogation showed phasic and spontaneous flow. Assessment / Plan:     Mr. Cheli Maldonado is a 63-year-old gentleman with a history of diabetes, hypertension, hearing impairment venous stasis of lower extremities admitted on 1/6/2020 with worsening lower extremity wounds. From chart review, he was admitted from 12/4 to 12/11/2019 and the   discharge summary ndicates he had cellulitis of the left lower extremity. It is also noted that he was found to have bilateral venous stasis on the last discharge summary. He was treated with ceftezole and and then transition to oral Keflex. His blood cultures on last admission were negative. This admission he has blood cultures pending. Wound cultures that are unclear to me how this was obtained is preliminary positive for MRSA. He has been started on vancomycin and Zosyn IV. Podiatry has been consulted . On labs, he has a normal white count, anemia and normal renal function and LFTs. He has not had any imaging this admission  .        1 left lower extremity cellulitis  has multiple superficial open skin areas that could service ports of entry  Currently on vancomycin IV and Zosyn IV  Monitor renal function very closely  Elevation of the leg emphasized  Recommend imaging to ensure no underlying abscess, blood clots  Needs good wound care  Plan to de-escalate antibiotics based on improvement, clinically improved today with less warmth and erythema LLE, but would continue IV antibiotics today and monitor again tomorrow     2) cough  Recommend getting a chest x-ray  Already on IV antibiotics  Question aspiration risk    3) diabetes     4) hypertension    5) hearing impairment    6) DVT prophylaxis      Thank for the opportunity to participate in the care of this patient. Please contact with questions or concerns.        Bola Mejia DO  11:50 AM

## 2020-01-09 NOTE — PROGRESS NOTES
JONELLE: Patient is from the [facility]. The facility is coming to evaluate patient tomorrow to determine if he is appropriate to return to [facility]. ID and podiatry are following     CM noted orders for wound care and PT/OT. CM called Hayes Justice,  at the [address] [place] [address] and left message to find out what the fax number is for [facility]. Per conversation with Hayes Justice from the [facility] yesterday, they will need to come to the hospital to evaluate the patient before he can return to snf. CM will follow. CM received call from Hayes Justice at [facility]. She cannot come to the hospital to evaluate patient until tomorrow after lunch. Care management will continue to follow.     FRAN RamachandranW/CRM

## 2020-01-10 ENCOUNTER — APPOINTMENT (OUTPATIENT)
Dept: CT IMAGING | Age: 70
DRG: 638 | End: 2020-01-10
Attending: NEUROMUSCULOSKELETAL MEDICINE & OMM
Payer: MEDICARE

## 2020-01-10 LAB
ANION GAP SERPL CALC-SCNC: 5 MMOL/L (ref 5–15)
BUN SERPL-MCNC: 18 MG/DL (ref 6–20)
BUN/CREAT SERPL: 18 (ref 12–20)
CALCIUM SERPL-MCNC: 8 MG/DL (ref 8.5–10.1)
CHLORIDE SERPL-SCNC: 92 MMOL/L (ref 97–108)
CO2 SERPL-SCNC: 34 MMOL/L (ref 21–32)
CREAT SERPL-MCNC: 1.01 MG/DL (ref 0.7–1.3)
GLUCOSE BLD STRIP.AUTO-MCNC: 155 MG/DL (ref 65–100)
GLUCOSE BLD STRIP.AUTO-MCNC: 173 MG/DL (ref 65–100)
GLUCOSE BLD STRIP.AUTO-MCNC: 299 MG/DL (ref 65–100)
GLUCOSE BLD STRIP.AUTO-MCNC: 317 MG/DL (ref 65–100)
GLUCOSE SERPL-MCNC: 348 MG/DL (ref 65–100)
POTASSIUM SERPL-SCNC: 4 MMOL/L (ref 3.5–5.1)
SERVICE CMNT-IMP: ABNORMAL
SODIUM SERPL-SCNC: 131 MMOL/L (ref 136–145)

## 2020-01-10 PROCEDURE — 74011636320 HC RX REV CODE- 636/320

## 2020-01-10 PROCEDURE — 97116 GAIT TRAINING THERAPY: CPT

## 2020-01-10 PROCEDURE — 80048 BASIC METABOLIC PNL TOTAL CA: CPT

## 2020-01-10 PROCEDURE — 74011636637 HC RX REV CODE- 636/637: Performed by: INTERNAL MEDICINE

## 2020-01-10 PROCEDURE — 82962 GLUCOSE BLOOD TEST: CPT

## 2020-01-10 PROCEDURE — 97165 OT EVAL LOW COMPLEX 30 MIN: CPT

## 2020-01-10 PROCEDURE — 36415 COLL VENOUS BLD VENIPUNCTURE: CPT

## 2020-01-10 PROCEDURE — 74011250637 HC RX REV CODE- 250/637: Performed by: INTERNAL MEDICINE

## 2020-01-10 PROCEDURE — 74011250636 HC RX REV CODE- 250/636: Performed by: INTERNAL MEDICINE

## 2020-01-10 PROCEDURE — 73701 CT LOWER EXTREMITY W/DYE: CPT

## 2020-01-10 PROCEDURE — 97110 THERAPEUTIC EXERCISES: CPT

## 2020-01-10 PROCEDURE — 74011000258 HC RX REV CODE- 258: Performed by: INTERNAL MEDICINE

## 2020-01-10 PROCEDURE — 74011636637 HC RX REV CODE- 636/637: Performed by: NEUROMUSCULOSKELETAL MEDICINE & OMM

## 2020-01-10 PROCEDURE — 65270000029 HC RM PRIVATE

## 2020-01-10 PROCEDURE — 74011250637 HC RX REV CODE- 250/637: Performed by: HOSPITALIST

## 2020-01-10 PROCEDURE — 74011000258 HC RX REV CODE- 258: Performed by: NEUROMUSCULOSKELETAL MEDICINE & OMM

## 2020-01-10 PROCEDURE — 97161 PT EVAL LOW COMPLEX 20 MIN: CPT

## 2020-01-10 RX ORDER — DOXYCYCLINE HYCLATE 100 MG
100 TABLET ORAL EVERY 12 HOURS
Status: DISCONTINUED | OUTPATIENT
Start: 2020-01-10 | End: 2020-01-13 | Stop reason: HOSPADM

## 2020-01-10 RX ORDER — INSULIN GLARGINE 100 [IU]/ML
40 INJECTION, SOLUTION SUBCUTANEOUS EVERY EVENING
Status: DISCONTINUED | OUTPATIENT
Start: 2020-01-10 | End: 2020-01-13 | Stop reason: HOSPADM

## 2020-01-10 RX ORDER — SODIUM CHLORIDE 0.9 % (FLUSH) 0.9 %
10 SYRINGE (ML) INJECTION
Status: COMPLETED | OUTPATIENT
Start: 2020-01-10 | End: 2020-01-10

## 2020-01-10 RX ADMIN — HEPARIN SODIUM 5000 UNITS: 5000 INJECTION INTRAVENOUS; SUBCUTANEOUS at 22:03

## 2020-01-10 RX ADMIN — INSULIN LISPRO 2 UNITS: 100 INJECTION, SOLUTION INTRAVENOUS; SUBCUTANEOUS at 17:32

## 2020-01-10 RX ADMIN — DOCUSATE SODIUM 100 MG: 100 CAPSULE, LIQUID FILLED ORAL at 09:23

## 2020-01-10 RX ADMIN — Medication 10 ML: at 08:05

## 2020-01-10 RX ADMIN — SERTRALINE HYDROCHLORIDE 25 MG: 50 TABLET ORAL at 21:40

## 2020-01-10 RX ADMIN — PIPERACILLIN AND TAZOBACTAM 3.38 G: 3; .375 INJECTION, POWDER, FOR SOLUTION INTRAVENOUS at 11:33

## 2020-01-10 RX ADMIN — INSULIN LISPRO 3 UNITS: 100 INJECTION, SOLUTION INTRAVENOUS; SUBCUTANEOUS at 21:39

## 2020-01-10 RX ADMIN — INSULIN LISPRO 2 UNITS: 100 INJECTION, SOLUTION INTRAVENOUS; SUBCUTANEOUS at 14:20

## 2020-01-10 RX ADMIN — CASTOR OIL AND BALSAM, PERU: 788; 87 OINTMENT TOPICAL at 06:37

## 2020-01-10 RX ADMIN — DOXYCYCLINE HYCLATE 100 MG: 100 TABLET, COATED ORAL at 21:40

## 2020-01-10 RX ADMIN — VITAM B12 100 MCG: 100 TAB at 09:23

## 2020-01-10 RX ADMIN — CASTOR OIL AND BALSAM, PERU: 788; 87 OINTMENT TOPICAL at 21:45

## 2020-01-10 RX ADMIN — METOPROLOL SUCCINATE 100 MG: 50 TABLET, EXTENDED RELEASE ORAL at 09:23

## 2020-01-10 RX ADMIN — LISINOPRIL 20 MG: 10 TABLET ORAL at 09:23

## 2020-01-10 RX ADMIN — CASTOR OIL AND BALSAM, PERU: 788; 87 OINTMENT TOPICAL at 14:24

## 2020-01-10 RX ADMIN — FERROUS SULFATE TAB 325 MG (65 MG ELEMENTAL FE) 325 MG: 325 (65 FE) TAB at 09:23

## 2020-01-10 RX ADMIN — VANCOMYCIN HYDROCHLORIDE 1750 MG: 10 INJECTION, POWDER, LYOPHILIZED, FOR SOLUTION INTRAVENOUS at 03:50

## 2020-01-10 RX ADMIN — DOXYCYCLINE HYCLATE 100 MG: 100 TABLET, COATED ORAL at 14:30

## 2020-01-10 RX ADMIN — HEPARIN SODIUM 5000 UNITS: 5000 INJECTION INTRAVENOUS; SUBCUTANEOUS at 14:21

## 2020-01-10 RX ADMIN — MAGNESIUM OXIDE TAB 400 MG (241.3 MG ELEMENTAL MG) 400 MG: 400 (241.3 MG) TAB at 09:23

## 2020-01-10 RX ADMIN — MELATONIN 4.5 MG: at 21:40

## 2020-01-10 RX ADMIN — Medication 1 CAPSULE: at 09:23

## 2020-01-10 RX ADMIN — SODIUM CHLORIDE 100 ML: 900 INJECTION, SOLUTION INTRAVENOUS at 08:05

## 2020-01-10 RX ADMIN — INSULIN GLARGINE 40 UNITS: 100 INJECTION, SOLUTION SUBCUTANEOUS at 21:39

## 2020-01-10 RX ADMIN — Medication 10 ML: at 11:34

## 2020-01-10 RX ADMIN — IOPAMIDOL 100 ML: 755 INJECTION, SOLUTION INTRAVENOUS at 08:04

## 2020-01-10 RX ADMIN — PIPERACILLIN AND TAZOBACTAM 3.38 G: 3; .375 INJECTION, POWDER, FOR SOLUTION INTRAVENOUS at 02:50

## 2020-01-10 RX ADMIN — INSULIN LISPRO 7 UNITS: 100 INJECTION, SOLUTION INTRAVENOUS; SUBCUTANEOUS at 06:37

## 2020-01-10 RX ADMIN — Medication 10 ML: at 21:48

## 2020-01-10 RX ADMIN — HEPARIN SODIUM 5000 UNITS: 5000 INJECTION INTRAVENOUS; SUBCUTANEOUS at 06:37

## 2020-01-10 NOTE — PROGRESS NOTES
JONELLE:  [facility] came to evaluate the patient, they are recommending SNF placement before patient can return to [facility]. Referral pending with [facility]. CM spoke with patient's brother Devon Carvalho [phone]. Family prefers patient go to [facility]. CM sent referral via [facility]. Brother confirmed that patient does not have a secondary insurance. He plans to apply for medicaid as a secondary. CM offered screening for Medicaid Long-Term Services & Supports. Family consented to screening.     Brigette James, BSW/CRM

## 2020-01-10 NOTE — PROGRESS NOTES
NUTRITION       Brief follow-up. Previous goal met with current appetite/ONS intake. Consistent CHO diet + Ensure High Protein BID (B+L); likes chocolate. ONS = 320 kcal, 32 gm pro and 38 gm CHO/day. Rx Lispro and Lantus; POC , yesterday 328 and 209. Lantus increased from 30 to 40 units today. Pt claims, \"appetite alright\"; No I&O to confirm % intake. Natural teeth, chew/swallow without difficulty. Last documented BM 1/8/20. Last B12 low 173 (1/7/20); Ensure High Pro provides 60% DV B12 = 3.6mcg + Rx B12. Discharge planned w/ABX. NH resident. Estimated Nutrition Needs:   Kcals/day: 2200 Kcals/day  Protein: 120 g(~1.2 gm/kg)  Fluid: 2200 ml(~1 mL/kcal)  Based On: Shanell Rockwell (MSJ x 1.2)  Weight Used: Actual wt (105.2 kg)    Goals:          Continue % intake all meals and minimum 20 gm supplemental protein daily for next two weeks.           Demarcus Fabian, MICHAEL

## 2020-01-10 NOTE — PROGRESS NOTES
Problem: Self Care Deficits Care Plan (Adult)  Goal: *Acute Goals and Plan of Care (Insert Text)  Description  Occupational Therapy Goals  Initiated 1/10/2020   1. Patient will complete toilet transfer with supervision to [facility] within 7 days. 2.  Patient will complete toileting with mod A within 7 days. 3.  Patient will complete lower body dressing activities using AE as needed with min A within 7 days. 4.  Patient will complete bathing activities with min A from chair level within 7 days. 5.  Patient will be independent with UE exercise program within 7 days. Outcome: Progressing Towards Goal     OCCUPATIONAL THERAPY EVALUATION  Patient: Saud Costa (72 y.o. male)  Date: 1/10/2020  Primary Diagnosis: Diabetic foot infection ([facility] [address] [E11.628, L08.9]        Precautions:   Contact, Fall(extremely [place]    ASSESSMENT  Based on the objective data described below, the patient presents with decreased independence with all self care and functional mobility following admission for cellulitis. Pt sitting up in recliner chair following physical therapy intervention this morning. He demonstrates ability to complete basic activities with supervision from chair level including self feeding and grooming activities. However, due to body habitus and debility, he requires assistance for LB access and thoroughness with bathing activities. For OT assessment, he declined to transfer out of chair to demonstrate toileting activities at this time. He did complete light ADL activities from chair level. Due to pt's hearing loss, communication needs to be written on a white board. He is agreeable to light activities but overall confused. Current Level of Function Impacting Discharge (ADLs/self-care): max A to total A for basic ADL activities    Functional Outcome Measure: The patient scored 45 on the Barthel Index outcome measure which is indicative of significant impairment with basic ADL activities.       Other factors to consider for discharge: obesity     Patient will benefit from skilled therapy intervention to address the above noted impairments. PLAN :  Recommendations and Planned Interventions: self care training, functional mobility training, therapeutic exercise, balance training, visual/perceptual training, therapeutic activities, endurance activities, patient education, home safety training, and family training/education    Frequency/Duration: Patient will be followed by occupational therapy 3 times a week to address goals. Recommendation for discharge: (in order for the patient to meet his/her long term goals)  Therapy up to 5 days/week in SNF setting or an intensive home health therapy program    This discharge recommendation:  Has been made in collaboration with the attending provider and/or case management    IF patient discharges home will need the following DME: none       SUBJECTIVE:   Patient stated I am fine. When am I leaving?     OBJECTIVE DATA SUMMARY:   HISTORY:   History reviewed. No pertinent past medical history. History reviewed. No pertinent surgical history.     Expanded or extensive additional review of patient history:     Home Situation  Home Environment: [facility] Name: [place])  # Steps to Enter: 0  One/Two Story Residence: One story  Living Alone: No  Support Systems: Home care staff  Patient Expects to be Discharged to[de-identified] Rehabilitation facility  Current DME Used/Available at Home: Wheelchair, Walker, rolling  Tub or Shower Type: Shower    Hand dominance: Right    EXAMINATION OF PERFORMANCE DEFICITS:  Cognitive/Behavioral Status:  Neurologic State: Confused  Orientation Level: Oriented to person;Oriented to place;Oriented to situation  Cognition: Follows commands(written communication only)  Perception: Appears intact  Perseveration: No perseveration noted  Safety/Judgement: Good awareness of safety precautions    Skin: see nursing notes    Edema: none noted    Hearing: Auditory  Auditory Impairment: Hard of hearing, bilateral(needing to write communication on white boards)    Vision/Perceptual:                           Acuity: Within Defined Limits         Range of Motion:    AROM: Generally decreased, functional  PROM: Generally decreased, functional                      Strength:    Strength: Generally decreased, functional                Coordination:  Coordination: Generally decreased, functional  Fine Motor Skills-Upper: Right Intact; Left Intact    Gross Motor Skills-Upper: Right Intact; Left Intact    Tone & Sensation:    Tone: Normal  Sensation: Intact                      Balance:  Sitting: Intact; Without support  Standing: Impaired; With support  Standing - Static: Good  Standing - Dynamic : Fair    Functional Mobility and Transfers for ADLs:  Bed Mobility:  Supine to Sit: Supervision  Sit to Supine: Supervision    Transfers:  Sit to Stand: Contact guard assistance; Additional time  Stand to Sit: Contact guard assistance; Adaptive equipment  Bed to Chair: Contact guard assistance; Adaptive equipment  Bathroom Mobility: Contact guard assistance  Toilet Transfer : Contact guard assistance    ADL Assessment:  Feeding: Supervision    Oral Facial Hygiene/Grooming: Supervision(sitting in chair)    Bathing: Maximum assistance(anticipate difficulty with ADL tasks due to body habitus)    Upper Body Dressing: Minimum assistance    Lower Body Dressing: Maximum assistance(anticipate difficulties with activities due to body habitus)    Toileting: Total assistance                ADL Intervention and task modifications:     Light grooming activities from chair level. Declined toileting, declined bathing, declined standing or transferring from recliner to bed. Pt very comfortable sitting up in recliner with feet elevated.                                  Cognitive Retraining  Safety/Judgement: Good awareness of safety precautions    Functional Measure:  Barthel Index:    Bathin  Bladder: 5  Bowels: 5  Groomin  Dressin  Feeding: 10  Mobility: 5  Stairs: 0  Toilet Use: 5  Transfer (Bed to Chair and Back): 10  Total: 45/100        The Barthel ADL Index: Guidelines  1. The index should be used as a record of what a patient does, not as a record of what a patient could do. 2. The main aim is to establish degree of independence from any help, physical or verbal, however minor and for whatever reason. 3. The need for supervision renders the patient not independent. 4. A patient's performance should be established using the best available evidence. Asking the patient, friends/relatives and nurses are the usual sources, but direct observation and common sense are also important. However direct testing is not needed. 5. Usually the patient's performance over the preceding 24-48 hours is important, but occasionally longer periods will be relevant. 6. Middle categories imply that the patient supplies over 50 per cent of the effort. 7. Use of aids to be independent is allowed. Bety Kothari., Barthel, D.W. (0394). Functional evaluation: the Barthel Index. [facility] (14)2. GRETA SharmaF, Addison Evans., Piper Ruedaow., [place], [address] [place] [address] Ave (). Measuring the change indisability after inpatient rehabilitation; comparison of the responsiveness of the Barthel Index and Functional Barnhart Measure. Journal of Neurology, Neurosurgery, and Psychiatry, 66(4), 695-747. Bhavin Read, N.J.A, HAROON Benz, & Guerda Mac, M.A. (2004.) Assessment of post-stroke quality of life in cost-effectiveness studies: The usefulness of the Barthel Index and the EuroQoL-5D.  Quality of Life Research, 15, 504-62         Occupational Therapy Evaluation Charge Determination   History Examination Decision-Making   LOW Complexity : Brief history review  HIGH Complexity : 5 or more performance deficits relating to physical, cognitive , or psychosocial skils that result in activity limitations and / or participation restrictions HIGH Complexity : Patient presents with comorbidities that affect occupational performance. Signifigant modification of tasks or assistance (eg, physical or verbal) with assessment (s) is necessary to enable patient to complete evaluation       Based on the above components, the patient evaluation is determined to be of the following complexity level: LOW   Pain Rating:  No pain reported    Activity Tolerance:   Good  Please refer to the flowsheet for vital signs taken during this treatment. After treatment patient left in no apparent distress:    Sitting in chair, Call bell within reach, and LE's elevated     COMMUNICATION/EDUCATION:   The patients plan of care was discussed with: Physical Therapist and Registered Nurse. Home safety education was provided and the patient/caregiver indicated understanding. and Patient/family agree to work toward stated goals and plan of care. This patients plan of care is appropriate for delegation to [facility] [place].     Thank you for this referral.  Amarilys Arrington, OT  Time Calculation: 15 mins

## 2020-01-10 NOTE — PROGRESS NOTES
Infectious Disease Progress Note          HPI:    Mr Shabana Edge seen    Hill Sanabria \" when can I go home\"   Denied pain   Denied nausea, vomiting, diarrhea, fever, chills    Meds    Current Facility-Administered Medications:     iopamidol (ISOVUE 300) 61 % contrast injection 100 mL, 100 mL, IntraVENous, RAD ONCE, Des Copeland DO    insulin glargine (LANTUS) injection 40 Units, 40 Units, SubCUTAneous, QPM, Des Copeland DO    vancomycin (VANCOCIN) 1750 mg in  ml infusion, 1,750 mg, IntraVENous, Q12H, Adilene Rangel MD, Last Rate: 250 mL/hr at 01/10/20 0350, 1,750 mg at 01/10/20 0350    traZODone (DESYREL) tablet 50 mg, 50 mg, Oral, QHS PRN, Des Copeland DO    ferrous sulfate tablet 325 mg, 1 Tab, Oral, DAILY WITH BREAKFAST, Ford Barraza MD, 325 mg at 01/10/20 0049    cyanocobalamin (VITAMIN B12) tablet 100 mcg, 100 mcg, Oral, DAILY, Ford Barraza MD, 100 mcg at 01/10/20 0923    albuterol-ipratropium (DUO-NEB) 2.5 MG-0.5 MG/3 ML, 3 mL, Nebulization, Q6H PRN, Ford Barraza MD, 3 mL at 01/09/20 1221    balsam peru-castor oil (VENELEX) ointment, , Topical, Q8H, Josh Hernandez MD    docusate sodium (COLACE) capsule 100 mg, 100 mg, Oral, DAILY, Adilene Rangel MD, 100 mg at 01/10/20 0923    metoprolol succinate (TOPROL-XL) XL tablet 100 mg, 100 mg, Oral, DAILY, Adilene Rangel MD, 100 mg at 01/10/20 0923    sertraline (ZOLOFT) tablet 25 mg, 25 mg, Oral, QHS, Adilene Rangel MD, 25 mg at 01/09/20 2104    melatonin tablet 4.5 mg, 4.5 mg, Oral, QHS, Adilene Rangel MD, 4.5 mg at 01/09/20 2104    loperamide (IMODIUM) capsule 2 mg, 2 mg, Oral, Q6H PRN, Adilene Rangel MD    lisinopril (PRINIVIL, ZESTRIL) tablet 20 mg, 20 mg, Oral, DAILY, Adilene Rangel MD, 20 mg at 01/10/20 0923    magnesium oxide (MAG-OX) tablet 400 mg, 400 mg, Oral, DAILY, Adilene Rangel MD, 400 mg at 01/10/20 0923    sodium chloride (NS) flush 5-40 mL, 5-40 mL, IntraVENous, Q8H, Adilene Rangel MD, 10 mL at 01/08/20 2120    sodium chloride (NS) flush 5-40 mL, 5-40 mL, IntraVENous, PRN, Adilene Rangel MD, 10 mL at 01/10/20 1134    acetaminophen (TYLENOL) tablet 650 mg, 650 mg, Oral, Q4H PRN, Adilene Rangel MD    heparin (porcine) injection 5,000 Units, 5,000 Units, SubCUTAneous, Q8H, Adilene Rangel MD, 5,000 Units at 01/10/20 0637    ondansetron (ZOFRAN) injection 4 mg, 4 mg, IntraVENous, Q4H PRN, Adilene Rangel MD    lactobac ac& pc-s.therm-b.anim (VITOR Q/RISAQUAD), 1 Cap, Oral, DAILY, Adilene Rangel MD, 1 Cap at 01/10/20 0923    piperacillin-tazobactam (ZOSYN) 3.375 g in 0.9% sodium chloride (MBP/ADV) 100 mL, 3.375 g, IntraVENous, Q8H, Adilene Rangel MD, Last Rate: 200 mL/hr at 01/10/20 1133, 3.375 g at 01/10/20 1133    insulin lispro (HUMALOG) injection, , SubCUTAneous, AC&HS, Adilene Rangel MD, 7 Units at 01/10/20 5148    glucose chewable tablet 16 g, 4 Tab, Oral, PRN, Adilene Rangel MD    glucagon (GLUCAGEN) injection 1 mg, 1 mg, IntraMUSCular, PRN, Adilene Rangel MD    dextrose 10% infusion 0-250 mL, 0-250 mL, IntraVENous, PRN, Adilene Rangel MD    lactated Ringers infusion, 75 mL/hr, IntraVENous, CONTINUOUS, Adilene Rangel MD, Last Rate: 75 mL/hr at 01/07/20 0027, 75 mL/hr at 01/07/20 0027    Vancomycin- pharmacy to dose, , Other, Rx Dosing/Monitoring, [place], MD      Physical Exam:    Vitals:   Patient Vitals for the past 24 hrs:   Temp Pulse Resp BP SpO2   01/10/20 0922 97.2 °F (36.2 °C) 77 16 161/79 95 %   01/10/20 0321 97.7 °F (36.5 °C) 83 16 171/74 98 %   01/09/20 2101 98 °F (36.7 °C) 70 18 115/71 98 %     · GEN: NAD  · HEENT:  no scleral icterus,  no thrush  · CV: S1, S2 heard regularly,   · Lungs: Clear to auscultation bilaterally  · Abdomen: soft, non distended, non tender  · Skin: excoriations noted LE  EXT/MSK:    Left lower extremity erythema and  Warmth improved   Chronic venous changes noted RLE and LLE   superficial skin tear, excoriations noted LLE, dry open skin in certain areas noted dorsal foot LLE         Labs:   Recent Results (from the past 24 hour(s))   GLUCOSE, POC    Collection Time: 01/09/20  5:06 PM   Result Value Ref Range    Glucose (POC) 209 (H) 65 - 100 mg/dL    Performed by [id number], POC    Collection Time: 01/09/20  9:11 PM   Result Value Ref Range    Glucose (POC) 328 (H) 65 - 100 mg/dL    Performed by [address] [place] [address], BASIC    Collection Time: 01/10/20  3:52 AM   Result Value Ref Range    Sodium 131 (L) 136 - 145 mmol/L    Potassium 4.0 3.5 - 5.1 mmol/L    Chloride 92 (L) 97 - 108 mmol/L    CO2 34 (H) 21 - 32 mmol/L    Anion gap 5 5 - 15 mmol/L    Glucose 348 (H) 65 - 100 mg/dL    BUN 18 6 - 20 MG/DL    Creatinine 1.01 0.70 - 1.30 MG/DL    BUN/Creatinine ratio 18 12 - 20      GFR est AA >60 >60 ml/min/1.73m2    GFR est non-AA >60 >60 ml/min/1.73m2    Calcium 8.0 (L) 8.5 - 10.1 MG/DL   GLUCOSE, POC    Collection Time: 01/10/20  6:17 AM   Result Value Ref Range    Glucose (POC) 317 (H) 65 - 100 mg/dL    Performed by Charli Cordova, POC    Collection Time: 01/10/20 12:48 PM   Result Value Ref Range    Glucose (POC) 155 (H) 65 - 100 mg/dL    Performed by Marni Jefferson        Microbiology Data:      Wound 1/7/20  Specimen Information: Wound Drainage        Component Value Ref Range & Units Status   Special Requests: NO SPECIAL REQUESTS    Preliminary   GRAM STAIN NO WBC'S SEEN    Preliminary   GRAM STAIN NO ORGANISMS SEEN    Preliminary   Culture result: Abnormal     Preliminary   LIGHT   Preliminary report of Methicillin Resistant Staphylococcus aureus by antigen detection. Confirmation and Sensitivities to follow.       Blood 1/6/20       Component Value Ref Range & Units Status   Special Requests: NO SPECIAL REQUESTS    Preliminary   Culture result: NO GROWTH 2 DAYS    Preliminary   Result History       12/4/19  Specimen Information: Blood        Component Value Ref Range & Units Status   Special Requests: NO SPECIAL REQUESTS    Final   Culture result: NO GROWTH 5 DAYS    Final   Result History               Pathology Results:    Imaging:   TTE 12/8/19  Left Ventricle Normal cavity size and systolic function (ejection fraction normal). Mild concentric hypertrophy. The estimated ejection fraction is 61 - 65%. Visually measured ejection fraction. Unable to assess diastolic function. Wall Scoring The left ventricular wall motion is normal.            Left Atrium Normal cavity size. Right Ventricle Normal cavity size and global systolic function. Right Atrium Right atrium not well visualized. Interatrial Septum Interatrial septum not well visualized   Aortic Valve Aortic valve not well visualized. No regurgitation. Mitral Valve Mitral valve not well visualized. No stenosis and no regurgitation. Tricuspid Valve Tricuspid valve not well visualized. Trace tricuspid valve regurgitation. Mild pulmonary hypertension. Pulmonic Valve Pulmonic valve not well visualized. No regurgitation. Aorta Normal aortic root. Pericardium Trivial-to-small posterior pericardial effusion, The effusion measures 12 mm. No indications of tamponade present       CXR 12/4/19  FINDINGS: A portable AP radiograph of the chest was obtained at 1835 hours. There is no consolidation or pulmonary edema. There is no pneumothorax or  pleural effusion. Cardiac size is mildly enlarged. There is no mediastinal or  hilar enlargement is evident. The bones are mildly osteopenic.      IMPRESSION  IMPRESSION: No acute findings. Duplex Lower extremity 12/4/19  · No evidence of acute deep vein thrombosis in the right common femoral, superficial femoral, popliteal, posterior tibial, and peroneal veins. The veins were imaged in the transverse and longitudinal planes. The vessels showed normal color filling and compressibility. Doppler interrogation showed phasic and spontaneous flow.   · No evidence of acute deep vein thrombosis in the left common femoral, superficial femoral, popliteal, posterior tibial, and peroneal veins. The veins were imaged in the transverse and longitudinal planes. The vessels showed normal color filling and compressibility. Doppler interrogation showed phasic and spontaneous flow. CT LLE     FINDINGS: Bones: Normal bone mineralization. No fracture, dislocation, or  periosteal reaction. No evidence of osteomyelitis.     Joint fluid: No significant joint effusion.     Articulations: Moderate to severe degenerative changes seen in the midfoot.     Tendons: No full-thickness tendon tear.     Muscles: There is diffuse atrophy of the musculature.     Soft tissue mass: No mass. There is bilateral diffuse subcutaneous edema. No  evidence of a focal drainable fluid collection. There are extensive  atherosclerotic calcifications.     IMPRESSION  IMPRESSION:   1. Diffuse mild subcutaneous edema without evidence of a focal drainable fluid  collection. 2. Diffuse muscle atrophy    Assessment / Plan:     Mr. Justin Cortez is a 59-year-old gentleman with a history of diabetes, hypertension, hearing impairment venous stasis of lower extremities admitted on 1/6/2020 with worsening lower extremity wounds. From chart review, he was admitted from 12/4 to 12/11/2019 and the   discharge summary ndicates he had cellulitis of the left lower extremity. It is also noted that he was found to have bilateral venous stasis on the last discharge summary. He was treated with ceftezole and and then transition to oral Keflex. His blood cultures on last admission were negative. This admission he has blood cultures pending. Wound cultures that are unclear to me how this was obtained is preliminary positive for MRSA. He has been started on vancomycin and Zosyn IV. Podiatry has been consulted . On labs, he has a normal white count, anemia and normal renal function and LFTs. He has not had any imaging this admission  .        1 left lower extremity cellulitis  has multiple superficial open skin areas that could service ports of entry  Improved on  vancomycin IV and Zosyn IV  Recommend doxycycline 100 mg bid for 7 days   If after transition to doxycycline, his symptoms, worsen may need IV antibiotics again. Recommend monitoring response to doxycycline before DC and ensure that he is not worsening   Elevation of the leg emphasized  F/u with lymphedema clinic and vascular workup   Needs good wound care  Risk for CDiff and antibiotic associated adverse effects       2) cough  Recommend getting a chest x-ray  Already on IV antibiotics  Question aspiration risk    3) diabetes     4) hypertension    5) hearing impairment    6) DVT prophylaxis    I will sign off at this time   Thank for the opportunity to participate in the care of this patient. Please contact with questions or concerns.        Snehal Pressley, DO  2:47 PM

## 2020-01-10 NOTE — PROGRESS NOTES
Bedside and Verbal shift change report given to Esha Song RN (oncoming nurse) by Rosibel Ramachandran RN (offgoing nurse). Report included the following information SBAR, Kardex, Procedure Summary, Intake/Output, MAR and Recent Results.

## 2020-01-10 NOTE — PROGRESS NOTES
Problem: Mobility Impaired (Adult and Pediatric)  Goal: *Acute Goals and Plan of Care (Insert Text)  Description  FUNCTIONAL STATUS PRIOR TO ADMISSION: Patient was modified independent using a rolling walker and wheelchair for functional mobility. HOME SUPPORT PRIOR TO ADMISSION: The patient lived at a group home, [facility], prior to admission. Physical Therapy Goals  Initiated 1/10/2020  1. Patient will move from supine to sit and sit to supine  and roll side to side in bed with supervision/set-up within 7 day(s). 2.  Patient will transfer from bed to chair and chair to bed with supervision/set-up using the least restrictive device within 7 day(s). 3.  Patient will perform sit to stand with supervision/set-up within 7 day(s). 4.  Patient will ambulate with supervision/set-up for 50 feet with the least restrictive device within 7 day(s). Outcome: Progressing Towards Goal   PHYSICAL THERAPY EVALUATION  Patient: Juju Smalls (36 y.o. male)  Date: 1/10/2020  Primary Diagnosis: Diabetic foot infection ([facility] [E11.628, L08.9]        Precautions:   Contact, Fall      ASSESSMENT  Based on the objective data described below, the patient presents with decreased endurance compared to baseline after being admitted with cellulitis LLE. He was able to transfer OOB to the wheelchair and stood several times with the walker with CGA overall. He does have decreased safety awareness, but overall good balance with the walker. He was living in a group home using a wheelchair primarily prior to admission. Today he worked on sitting and standing balance activities and wheelchair mobility today and was much more calm and happy having something to do. His primary limitation is actually how limited his hearing is, so communication is very challenging. Recommend he continue to be up and OOB with nursing and ideally in a recliner chair to relieve the cellulitis.   We will continue to increase his endurance as he is able to tolerate, but expect he is likely fairly close to his baseline mobility based on previous admissions. Current Level of Function Impacting Discharge (mobility/balance): CGA to min assist for transfers and short distance gait    Functional Outcome Measure: The patient scored Total: 45/100 on the Barthel Index which is indicative of moderately impaired ability to care for basic self needs/dependency on others. Other factors to consider for discharge: plan is to return to group home     Patient will benefit from skilled therapy intervention to address the above noted impairments. PLAN :  Recommendations and Planned Interventions: bed mobility training, transfer training, gait training, therapeutic exercises, patient and family training/education, and therapeutic activities      Frequency/Duration: Patient will be followed by physical therapy:  3 times a week to address goals. Recommendation for discharge: (in order for the patient to meet his/her long term goals)  Physical therapy at least 2 days/week in the home AND ensure assist and/or supervision for safety with activity, which he had prior to admission at group [facility]     This discharge recommendation:  Has been made in collaboration with the attending provider and/or case management    IF patient discharges home will need the following DME: patient owns DME required for discharge         SUBJECTIVE:   Patient stated Marian Gonzales can I go home? Dorian Gonzales    OBJECTIVE DATA SUMMARY:   HISTORY:    History reviewed. No pertinent past medical history. History reviewed. No pertinent surgical history.     Personal factors and/or comorbidities impacting plan of care: hard of hearing, obesity, LLE cellulitis, weakness         EXAMINATION/PRESENTATION/DECISION MAKING:   Critical Behavior:  Neurologic State: Confused  Orientation Level: Oriented to person, Oriented to place  Cognition: Follows commands, Decreased attention/concentration, Poor safety awareness Hearing: Auditory  Auditory Impairment: Hard of hearing, bilateral  Skin:  LLE dressings intact, skin is dry and flaky and erythema noted LLE  Edema: not pitting  Range Of Motion:  AROM: Generally decreased, functional(mild shld elevation limitation)                       Strength:    Strength: Generally decreased, functional                    Tone & Sensation:   Tone: Normal              Sensation: Intact               Coordination:  Coordination: Within functional limits  Vision:      Functional Mobility:  Bed Mobility:     Supine to Sit: Supervision; Additional time  Sit to Supine: (up in chair after)     Transfers:  Sit to Stand: Contact guard assistance; Adaptive equipment; Additional time  Stand to Sit: Contact guard assistance; Adaptive equipment; Additional time        Bed to Chair: Adaptive equipment; Additional time;Contact guard assistance              Balance:   Sitting: Intact; Without support  Standing: Impaired; With support  Standing - Static: Good  Standing - Dynamic : Fair  Ambulation/Gait Training:  Distance (ft): 5 Feet (ft)  Assistive Device: Gait belt;Walker, rolling  Ambulation - Level of Assistance: Contact guard assistance; Adaptive equipment; Additional time        Gait Abnormalities: Decreased step clearance;Trunk sway increased        Base of Support: Widened     Speed/Caitlin: Pace decreased (<100 feet/min)  Step Length: Left shortened;Right shortened        Interventions: Safety awareness training;Verbal cues; Tactile cues            Stairs: Therapeutic Exercises:   Standing balance, sit to stand practice, wheelchair propulsion with LEs    Functional Measure:  Barthel Index:    Bathin  Bladder: 5  Bowels: 5  Groomin  Dressin  Feeding: 10  Mobility: 5  Stairs: 0  Toilet Use: 5  Transfer (Bed to Chair and Back): 10  Total: 45/100       The Barthel ADL Index: Guidelines  1.  The index should be used as a record of what a patient does, not as a record of what a patient could do. 2. The main aim is to establish degree of independence from any help, physical or verbal, however minor and for whatever reason. 3. The need for supervision renders the patient not independent. 4. A patient's performance should be established using the best available evidence. Asking the patient, friends/relatives and nurses are the usual sources, but direct observation and common sense are also important. However direct testing is not needed. 5. Usually the patient's performance over the preceding 24-48 hours is important, but occasionally longer periods will be relevant. 6. Middle categories imply that the patient supplies over 50 per cent of the effort. 7. Use of aids to be independent is allowed. Opal Parsons., Barthel, D.W. (5650). Functional evaluation: the Barthel Index. [facility] (14)2. NOELLE Ybarra, Hugh Curry., Balta Hunter., [place], [address] (1999). Measuring the change indisability after inpatient rehabilitation; comparison of the responsiveness of the Barthel Index and Functional [place] Measure. Journal of Neurology, Neurosurgery, and Psychiatry, 66(4), 155-130. Yojana Alfaro, N.J.A, HAROON Benz, & Cecy Pate, MYennyA. (2004.) Assessment of post-stroke quality of life in cost-effectiveness studies: The usefulness of the Barthel Index and the EuroQoL-5D.  Quality of Life Research, 15, 294-06        Physical Therapy Evaluation Charge Determination   History Examination Presentation Decision-Making   HIGH Complexity :3+ comorbidities / personal factors will impact the outcome/ POC  MEDIUM Complexity : 3 Standardized tests and measures addressing body structure, function, activity limitation and / or participation in recreation  LOW Complexity : Stable, uncomplicated  LOW Complexity : FOTO score of       Based on the above components, the patient evaluation is determined to be of the following complexity level: LOW     Pain Rating:  No complaints of pain    Activity Tolerance:   Good and requires rest breaks  Please refer to the flowsheet for vital signs taken during this treatment. After treatment patient left in no apparent distress:   Sitting in chair and Call bell within reach    COMMUNICATION/EDUCATION:   The patients plan of care was discussed with: Registered Nurse and . Fall prevention education was provided and the patient/caregiver indicated understanding., Patient/family have participated as able in goal setting and plan of care. , and Patient/family agree to work toward stated goals and plan of care.     Thank you for this referral.  Angella Tolbert, PT   Time Calculation: 40 mins

## 2020-01-10 NOTE — PROGRESS NOTES
Bedside shift change report given to Gabriel Panchal (oncoming nurse) by Eddie Gomez (offgoing nurse). Report included the following information SBAR, Kardex, Intake/Output, MAR and Recent Results.

## 2020-01-10 NOTE — PROGRESS NOTES
Hospitalist Progress Note    NAME: Alesia Mujica   :  1950   MRN:  [id number]       Assessment / Plan:  1.  Left diabetic foot infection:  -  continue with vancomycin and Zosyn. Elevation. D/w brother on phone yesterday. -   ct completed with sq edema     2.  Bilateral lower extremity venous stasis:    - Wound Care consulted       3.  Uncontrolled Type 2 diabetes with hyperglycemia:    - The patient will be placed on sliding scale with insulin coverage.    - increase lantus from 30-40   check A1C      4.  Morbid obesity:  Dietary consult will be requested for dietary counseling.     5.  Hypertension:  We will continue with preadmission medication.     6.  Hyponatremia:  This is most likely due to volume depletion.  improved     7.  Anemia iron def    Start iron supplement     8.  Hearing impairment - pt is very upset in the hospital and wants to leave. Unclear at this time how long pt is willing to stay for treatment of cellulitis. D/w borther yesterday. He is out of town and won't be able to come in.     Code status: Full  DVT [address] discussed with: Patient/Family and Nurse  Disposition: TBD   Cellulitis due to Diabetes      30.0 - 39.9 Obese / Body mass index is 32.35 kg/m².                 Subjective:     Chief Complaint / Reason for Physician Visit  \"no pain or chills. \". Discussed with RN events overnight. Review of Systems:  Symptom Y/N Comments  Symptom Y/N Comments   Fever/Chills    Chest Pain     Poor Appetite    Edema     Cough    Abdominal Pain     Sputum    Joint Pain     SOB/LOBATO    Pruritis/Rash     Nausea/vomit    Tolerating PT/OT     Diarrhea    Tolerating Diet     Constipation    Other       Could NOT obtain due to:      Objective:     VITALS:   Last 24hrs VS reviewed since prior progress note.  Most recent are:  Patient Vitals for the past 24 hrs:   Temp Pulse Resp BP SpO2   01/10/20 0922 97.2 °F (36.2 °C) 77 16 161/79 95 %   01/10/20 0321 97.7 °F (36.5 °C) 83 16 171/74 98 %   01/09/20 2101 98 °F (36.7 °C) 70 18 115/71 98 %   01/09/20 1221     94 %     No intake or output data in the 24 hours ending 01/10/20 0923     PHYSICAL EXAM:  General: WD, WN. Alert, cooperative, no acute distress    EENT:  EOMI. Anicteric sclerae. MMM  Resp:  CTA bilaterally, no wheezing or rales. No accessory muscle use  CV:  Regular  rhythm,  No edema  GI:  Soft, Non distended, Non tender.  +Bowel sounds  Neurologic:  Alert and oriented X 3, normal speech,   Psych:   Good insight. Not anxious nor agitated  Skin:  No rashes. No jaundice    Reviewed most current lab test results and cultures  YES  Reviewed most current radiology test results   YES  Review and summation of old records today    NO  Reviewed patient's current orders and MAR    YES  PMH/SH reviewed - no change compared to H&P  ________________________________________________________________________  Care Plan discussed with:    Comments   Patient     Family      RN     Care Manager     Consultant                        Multidiciplinary team rounds were held today with , nursing, pharmacist and clinical coordinator. Patient's plan of care was discussed; medications were reviewed and discharge planning was addressed. ________________________________________________________________________  Total NON critical care TIME:  20   Minutes    Total CRITICAL CARE TIME Spent:   Minutes non procedure based      Comments   >50% of visit spent in counseling and coordination of care     ________________________________________________________________________  Juliene Saber, DO     Procedures: see electronic medical records for all procedures/Xrays and details which were not copied into this note but were reviewed prior to creation of Plan. LABS:  I reviewed today's most current labs and imaging studies.   Pertinent labs include:  No results for input(s): WBC, HGB, HCT, PLT, HGBEXT, HCTEXT, PLTEXT in the last 72 hours.   Recent Labs     01/10/20  0352 01/09/20  0431 01/08/20  0336   * 129* 131*   K 4.0 4.5 4.2   CL 92* 92* 97   CO2 34* 32 28   * 276* 257*   BUN 18 9 11   CREA 1.01 0.99 0.93   CA 8.0* 8.5 8.0*       Signed: Tiffanie Woodard, DO

## 2020-01-11 LAB
ANION GAP SERPL CALC-SCNC: 5 MMOL/L (ref 5–15)
BACTERIA SPEC CULT: NORMAL
BUN SERPL-MCNC: 14 MG/DL (ref 6–20)
BUN/CREAT SERPL: 18 (ref 12–20)
CALCIUM SERPL-MCNC: 8.1 MG/DL (ref 8.5–10.1)
CHLORIDE SERPL-SCNC: 95 MMOL/L (ref 97–108)
CO2 SERPL-SCNC: 34 MMOL/L (ref 21–32)
CREAT SERPL-MCNC: 0.77 MG/DL (ref 0.7–1.3)
GLUCOSE BLD STRIP.AUTO-MCNC: 162 MG/DL (ref 65–100)
GLUCOSE BLD STRIP.AUTO-MCNC: 259 MG/DL (ref 65–100)
GLUCOSE BLD STRIP.AUTO-MCNC: 329 MG/DL (ref 65–100)
GLUCOSE BLD STRIP.AUTO-MCNC: 346 MG/DL (ref 65–100)
GLUCOSE SERPL-MCNC: 184 MG/DL (ref 65–100)
POTASSIUM SERPL-SCNC: 3.8 MMOL/L (ref 3.5–5.1)
SERVICE CMNT-IMP: ABNORMAL
SERVICE CMNT-IMP: NORMAL
SODIUM SERPL-SCNC: 134 MMOL/L (ref 136–145)

## 2020-01-11 PROCEDURE — 74011250636 HC RX REV CODE- 250/636: Performed by: INTERNAL MEDICINE

## 2020-01-11 PROCEDURE — 65270000029 HC RM PRIVATE

## 2020-01-11 PROCEDURE — 80048 BASIC METABOLIC PNL TOTAL CA: CPT

## 2020-01-11 PROCEDURE — 74011250637 HC RX REV CODE- 250/637: Performed by: INTERNAL MEDICINE

## 2020-01-11 PROCEDURE — 82962 GLUCOSE BLOOD TEST: CPT

## 2020-01-11 PROCEDURE — 36415 COLL VENOUS BLD VENIPUNCTURE: CPT

## 2020-01-11 PROCEDURE — 74011636637 HC RX REV CODE- 636/637: Performed by: INTERNAL MEDICINE

## 2020-01-11 PROCEDURE — 74011250637 HC RX REV CODE- 250/637: Performed by: HOSPITALIST

## 2020-01-11 PROCEDURE — 74011636637 HC RX REV CODE- 636/637: Performed by: NEUROMUSCULOSKELETAL MEDICINE & OMM

## 2020-01-11 RX ADMIN — LISINOPRIL 20 MG: 10 TABLET ORAL at 10:20

## 2020-01-11 RX ADMIN — INSULIN LISPRO 5 UNITS: 100 INJECTION, SOLUTION INTRAVENOUS; SUBCUTANEOUS at 11:24

## 2020-01-11 RX ADMIN — INSULIN GLARGINE 40 UNITS: 100 INJECTION, SOLUTION SUBCUTANEOUS at 22:18

## 2020-01-11 RX ADMIN — Medication 10 ML: at 22:24

## 2020-01-11 RX ADMIN — SERTRALINE HYDROCHLORIDE 25 MG: 50 TABLET ORAL at 22:22

## 2020-01-11 RX ADMIN — FERROUS SULFATE TAB 325 MG (65 MG ELEMENTAL FE) 325 MG: 325 (65 FE) TAB at 07:25

## 2020-01-11 RX ADMIN — SODIUM CHLORIDE, SODIUM LACTATE, POTASSIUM CHLORIDE, AND CALCIUM CHLORIDE 75 ML/HR: 600; 310; 30; 20 INJECTION, SOLUTION INTRAVENOUS at 10:18

## 2020-01-11 RX ADMIN — SODIUM CHLORIDE, SODIUM LACTATE, POTASSIUM CHLORIDE, AND CALCIUM CHLORIDE 75 ML/HR: 600; 310; 30; 20 INJECTION, SOLUTION INTRAVENOUS at 22:39

## 2020-01-11 RX ADMIN — METOPROLOL SUCCINATE 100 MG: 50 TABLET, EXTENDED RELEASE ORAL at 10:21

## 2020-01-11 RX ADMIN — DOXYCYCLINE HYCLATE 100 MG: 100 TABLET, COATED ORAL at 22:22

## 2020-01-11 RX ADMIN — VITAM B12 100 MCG: 100 TAB at 10:20

## 2020-01-11 RX ADMIN — DOXYCYCLINE HYCLATE 100 MG: 100 TABLET, COATED ORAL at 11:24

## 2020-01-11 RX ADMIN — CASTOR OIL AND BALSAM, PERU: 788; 87 OINTMENT TOPICAL at 22:24

## 2020-01-11 RX ADMIN — HEPARIN SODIUM 5000 UNITS: 5000 INJECTION INTRAVENOUS; SUBCUTANEOUS at 22:19

## 2020-01-11 RX ADMIN — HEPARIN SODIUM 5000 UNITS: 5000 INJECTION INTRAVENOUS; SUBCUTANEOUS at 16:04

## 2020-01-11 RX ADMIN — Medication 10 ML: at 16:04

## 2020-01-11 RX ADMIN — CASTOR OIL AND BALSAM, PERU: 788; 87 OINTMENT TOPICAL at 16:04

## 2020-01-11 RX ADMIN — DOCUSATE SODIUM 100 MG: 100 CAPSULE, LIQUID FILLED ORAL at 10:20

## 2020-01-11 RX ADMIN — MAGNESIUM OXIDE TAB 400 MG (241.3 MG ELEMENTAL MG) 400 MG: 400 (241.3 MG) TAB at 10:20

## 2020-01-11 RX ADMIN — CASTOR OIL AND BALSAM, PERU: 788; 87 OINTMENT TOPICAL at 07:24

## 2020-01-11 RX ADMIN — INSULIN LISPRO 4 UNITS: 100 INJECTION, SOLUTION INTRAVENOUS; SUBCUTANEOUS at 22:18

## 2020-01-11 RX ADMIN — Medication 1 CAPSULE: at 10:20

## 2020-01-11 RX ADMIN — HEPARIN SODIUM 5000 UNITS: 5000 INJECTION INTRAVENOUS; SUBCUTANEOUS at 07:17

## 2020-01-11 RX ADMIN — Medication 10 ML: at 07:17

## 2020-01-11 RX ADMIN — INSULIN LISPRO 7 UNITS: 100 INJECTION, SOLUTION INTRAVENOUS; SUBCUTANEOUS at 17:09

## 2020-01-11 RX ADMIN — MELATONIN 4.5 MG: at 22:22

## 2020-01-11 RX ADMIN — INSULIN LISPRO 2 UNITS: 100 INJECTION, SOLUTION INTRAVENOUS; SUBCUTANEOUS at 07:16

## 2020-01-11 NOTE — PROGRESS NOTES
Bedside and Verbal shift change report given to [employer] (oncoming nurse) by Triston Petty (offgoing nurse). Report included the following information SBAR, Kardex, Intake/Output, MAR and Recent Results.

## 2020-01-11 NOTE — PROGRESS NOTES
Hospitalist Progress Note    NAME: Leilani Mahoney   :  1950   MRN:  [id number]       Assessment / Plan:  1.  Left diabetic foot infection:  -  switched to po doxycycline. Elevation. D/w brother on phone yesterday. -   ct completed with sq edema. Awaiting snf.     2.  Bilateral lower extremity venous stasis:    - Wound Care consulted       3.  Uncontrolled Type 2 diabetes with hyperglycemia:    - controlled cont lantus 40 units daily.     4.  Morbid obesity:  Dietary consult will be requested for dietary counseling.     5.  Hypertension:  We will continue with preadmission medication.     6.  Hyponatremia:  This is most likely due to volume depletion.  improved     7.  Anemia iron def    Start iron supplement     8.  Hearing impairment - pt is very upset in the hospital and wants to leave. Papito Frances at this time how long pt is willing to stay for treatment of cellulitis. D/w borther yesterday. He is out of town and won't be able to come in.     Code status: Full  DVT [address] discussed with: Patient/Family and Nurse  Disposition: TBD   Cellulitis due to Diabetes      30.0 - 39.9 Obese / Body mass index is 32.35 kg/m².                  Subjective:     Chief Complaint / Reason for Physician Visit  \"no pain or chills\". Discussed with RN events overnight. Review of Systems:  Symptom Y/N Comments  Symptom Y/N Comments   Fever/Chills    Chest Pain     Poor Appetite    Edema     Cough    Abdominal Pain     Sputum    Joint Pain     SOB/LOBATO    Pruritis/Rash     Nausea/vomit    Tolerating PT/OT     Diarrhea    Tolerating Diet     Constipation    Other       Could NOT obtain due to:      Objective:     VITALS:   Last 24hrs VS reviewed since prior progress note.  Most recent are:  Patient Vitals for the past 24 hrs:   Temp Pulse Resp BP SpO2   20 0205 98.9 °F (37.2 °C) 60  119/64    01/10/20 2041 99.1 °F (37.3 °C) 92 16 122/71 95 %     No intake or output data in the 24 hours ending 01/11/20 0952     PHYSICAL EXAM:  General: sleeping  EENT:  EOMI. Anicteric sclerae. MMM  Resp:  CTA bilaterally, no wheezing or rales. No accessory muscle use  CV:  Regular  rhythm,  No edema  GI:  Soft, Non distended, Non tender.  +Bowel sounds  Neurologic:  sleeping    Reviewed most current lab test results and cultures  YES  Reviewed most current radiology test results   YES  Review and summation of old records today    NO  Reviewed patient's current orders and MAR    YES  PMH/SH reviewed - no change compared to H&P  ________________________________________________________________________  Care Plan discussed with:    Comments   Patient     Family      RN     Care Manager     Consultant                        Multidiciplinary team rounds were held today with , nursing, pharmacist and clinical coordinator. Patient's plan of care was discussed; medications were reviewed and discharge planning was addressed. ________________________________________________________________________  Total NON critical care TIME:  20   Minutes    Total CRITICAL CARE TIME Spent:   Minutes non procedure based      Comments   >50% of visit spent in counseling and coordination of care     ________________________________________________________________________  Willene Jetty, DO     Procedures: see electronic medical records for all procedures/Xrays and details which were not copied into this note but were reviewed prior to creation of Plan. LABS:  I reviewed today's most current labs and imaging studies. Pertinent labs include:  No results for input(s): WBC, HGB, HCT, PLT, HGBEXT, HCTEXT, PLTEXT in the last 72 hours.   Recent Labs     01/11/20  0209 01/10/20  0352 01/09/20  0431   * 131* 129*   K 3.8 4.0 4.5   CL 95* 92* 92*   CO2 34* 34* 32   * 348* 276*   BUN 14 18 9   CREA 0.77 1.01 0.99   CA 8.1* 8.0* 8.5       Signed: Carine Riggs,

## 2020-01-12 LAB
ANION GAP SERPL CALC-SCNC: 3 MMOL/L (ref 5–15)
BUN SERPL-MCNC: 9 MG/DL (ref 6–20)
BUN/CREAT SERPL: 13 (ref 12–20)
CALCIUM SERPL-MCNC: 8.4 MG/DL (ref 8.5–10.1)
CHLORIDE SERPL-SCNC: 97 MMOL/L (ref 97–108)
CO2 SERPL-SCNC: 34 MMOL/L (ref 21–32)
CREAT SERPL-MCNC: 0.72 MG/DL (ref 0.7–1.3)
GLUCOSE BLD STRIP.AUTO-MCNC: 156 MG/DL (ref 65–100)
GLUCOSE BLD STRIP.AUTO-MCNC: 202 MG/DL (ref 65–100)
GLUCOSE BLD STRIP.AUTO-MCNC: 264 MG/DL (ref 65–100)
GLUCOSE BLD STRIP.AUTO-MCNC: 288 MG/DL (ref 65–100)
GLUCOSE SERPL-MCNC: 182 MG/DL (ref 65–100)
POTASSIUM SERPL-SCNC: 4 MMOL/L (ref 3.5–5.1)
SERVICE CMNT-IMP: ABNORMAL
SODIUM SERPL-SCNC: 134 MMOL/L (ref 136–145)

## 2020-01-12 PROCEDURE — 74011250637 HC RX REV CODE- 250/637: Performed by: INTERNAL MEDICINE

## 2020-01-12 PROCEDURE — 74011250637 HC RX REV CODE- 250/637: Performed by: HOSPITALIST

## 2020-01-12 PROCEDURE — 74011250636 HC RX REV CODE- 250/636: Performed by: INTERNAL MEDICINE

## 2020-01-12 PROCEDURE — 74011636637 HC RX REV CODE- 636/637: Performed by: NEUROMUSCULOSKELETAL MEDICINE & OMM

## 2020-01-12 PROCEDURE — 74011636637 HC RX REV CODE- 636/637: Performed by: INTERNAL MEDICINE

## 2020-01-12 PROCEDURE — 82962 GLUCOSE BLOOD TEST: CPT

## 2020-01-12 PROCEDURE — 80048 BASIC METABOLIC PNL TOTAL CA: CPT

## 2020-01-12 PROCEDURE — 65270000029 HC RM PRIVATE

## 2020-01-12 PROCEDURE — 74011250636 HC RX REV CODE- 250/636: Performed by: NEUROMUSCULOSKELETAL MEDICINE & OMM

## 2020-01-12 RX ORDER — FUROSEMIDE 10 MG/ML
40 INJECTION INTRAMUSCULAR; INTRAVENOUS ONCE
Status: COMPLETED | OUTPATIENT
Start: 2020-01-12 | End: 2020-01-12

## 2020-01-12 RX ORDER — INSULIN LISPRO 100 [IU]/ML
5 INJECTION, SOLUTION INTRAVENOUS; SUBCUTANEOUS
Status: DISCONTINUED | OUTPATIENT
Start: 2020-01-12 | End: 2020-01-13 | Stop reason: HOSPADM

## 2020-01-12 RX ADMIN — SERTRALINE HYDROCHLORIDE 25 MG: 50 TABLET ORAL at 22:06

## 2020-01-12 RX ADMIN — METOPROLOL SUCCINATE 100 MG: 50 TABLET, EXTENDED RELEASE ORAL at 09:15

## 2020-01-12 RX ADMIN — INSULIN LISPRO 2 UNITS: 100 INJECTION, SOLUTION INTRAVENOUS; SUBCUTANEOUS at 06:39

## 2020-01-12 RX ADMIN — FERROUS SULFATE TAB 325 MG (65 MG ELEMENTAL FE) 325 MG: 325 (65 FE) TAB at 06:40

## 2020-01-12 RX ADMIN — MELATONIN 4.5 MG: at 22:06

## 2020-01-12 RX ADMIN — Medication 10 ML: at 06:03

## 2020-01-12 RX ADMIN — LISINOPRIL 20 MG: 10 TABLET ORAL at 09:15

## 2020-01-12 RX ADMIN — Medication 10 ML: at 11:21

## 2020-01-12 RX ADMIN — DOXYCYCLINE HYCLATE 100 MG: 100 TABLET, COATED ORAL at 22:07

## 2020-01-12 RX ADMIN — INSULIN LISPRO 5 UNITS: 100 INJECTION, SOLUTION INTRAVENOUS; SUBCUTANEOUS at 17:41

## 2020-01-12 RX ADMIN — CASTOR OIL AND BALSAM, PERU: 788; 87 OINTMENT TOPICAL at 22:10

## 2020-01-12 RX ADMIN — CASTOR OIL AND BALSAM, PERU: 788; 87 OINTMENT TOPICAL at 15:26

## 2020-01-12 RX ADMIN — Medication 10 ML: at 09:15

## 2020-01-12 RX ADMIN — INSULIN LISPRO 3 UNITS: 100 INJECTION, SOLUTION INTRAVENOUS; SUBCUTANEOUS at 22:00

## 2020-01-12 RX ADMIN — DOCUSATE SODIUM 100 MG: 100 CAPSULE, LIQUID FILLED ORAL at 09:15

## 2020-01-12 RX ADMIN — HEPARIN SODIUM 5000 UNITS: 5000 INJECTION INTRAVENOUS; SUBCUTANEOUS at 06:03

## 2020-01-12 RX ADMIN — INSULIN GLARGINE 40 UNITS: 100 INJECTION, SOLUTION SUBCUTANEOUS at 22:09

## 2020-01-12 RX ADMIN — FUROSEMIDE 40 MG: 10 INJECTION, SOLUTION INTRAMUSCULAR; INTRAVENOUS at 11:21

## 2020-01-12 RX ADMIN — HEPARIN SODIUM 5000 UNITS: 5000 INJECTION INTRAVENOUS; SUBCUTANEOUS at 15:26

## 2020-01-12 RX ADMIN — DOXYCYCLINE HYCLATE 100 MG: 100 TABLET, COATED ORAL at 11:21

## 2020-01-12 RX ADMIN — Medication 10 ML: at 22:11

## 2020-01-12 RX ADMIN — INSULIN LISPRO 5 UNITS: 100 INJECTION, SOLUTION INTRAVENOUS; SUBCUTANEOUS at 12:07

## 2020-01-12 RX ADMIN — HEPARIN SODIUM 5000 UNITS: 5000 INJECTION INTRAVENOUS; SUBCUTANEOUS at 22:10

## 2020-01-12 RX ADMIN — INSULIN LISPRO 3 UNITS: 100 INJECTION, SOLUTION INTRAVENOUS; SUBCUTANEOUS at 12:06

## 2020-01-12 RX ADMIN — VITAM B12 100 MCG: 100 TAB at 09:15

## 2020-01-12 RX ADMIN — Medication 1 CAPSULE: at 09:15

## 2020-01-12 RX ADMIN — CASTOR OIL AND BALSAM, PERU: 788; 87 OINTMENT TOPICAL at 06:02

## 2020-01-12 RX ADMIN — MAGNESIUM OXIDE TAB 400 MG (241.3 MG ELEMENTAL MG) 400 MG: 400 (241.3 MG) TAB at 09:15

## 2020-01-12 NOTE — PROGRESS NOTES
Hospitalist Progress Note    NAME: Andres Wilson   :  1950   MRN:  [id number]       Assessment / Plan:  1.  Left diabetic foot infection:  -  switched to po doxycycline. Elevation. D/w brother on phone yesterday. -   ct completed with sq edema. Awaiting snf ( can't go back to group home)     2.  Bilateral lower extremity venous stasis:    - wound care consulted.     3.  Uncontrolled Type 2 diabetes with hyperglycemia:    - controlled cont lantus 40 units daily. Add ac meal lispro for hi sugars during day.     4.  Morbid obesity:  Dietary consult will be requested for dietary counseling.     5.  Hypertension:  We will continue with preadmission medication.     6.  Hyponatremia:  This is most likely due to volume depletion.  improved     7.  Anemia iron def    Start iron supplement     8.  Hearing impairment - pt is very upset in the hospital and wants to leave. Versho Humphriesix at this time how long pt is willing to stay for treatment of cellulitis.  D/w borther yesterday. [facility] is out of town and won't be able to come in.     Code status: Full  DVT [address] discussed with: Patient/Family and Nurse  Disposition: TBD   Cellulitis due to Diabetes                        30.0 - 39.9 Obese / Body mass index is 32.36 kg/m². Subjective:     Chief Complaint / Reason for Physician Visit  \"no pain or chills. \". Discussed with RN events overnight. Review of Systems:  Symptom Y/N Comments  Symptom Y/N Comments   Fever/Chills    Chest Pain     Poor Appetite    Edema     Cough    Abdominal Pain     Sputum    Joint Pain     SOB/LOBATO    Pruritis/Rash     Nausea/vomit    Tolerating PT/OT     Diarrhea    Tolerating Diet     Constipation    Other       Could NOT obtain due to:      Objective:     VITALS:   Last 24hrs VS reviewed since prior progress note.  Most recent are:  Patient Vitals for the past 24 hrs:   Temp Pulse Resp BP SpO2   20 0340 97.4 °F (36.3 °C) 61 16 151/71 92 %   20 1951 98.2 °F (36.8 °C) 66 16 168/60 91 %   01/11/20 1442 98.3 °F (36.8 °C) 70 16 118/63 93 %   01/11/20 1018 98.3 °F (36.8 °C) 70 16 153/76 92 %     No intake or output data in the 24 hours ending 01/12/20 0906     PHYSICAL EXAM:  General: WD, WN. Alert, cooperative, no acute distress    EENT:  EOMI. Anicteric sclerae. MMM  Resp:  CTA bilaterally, no wheezing or rales. No accessory muscle use  CV:  Regular  rhythm,  No edema  GI:  Soft, Non distended, Non tender.  +Bowel sounds  Neurologic:  Alert and oriented X 3, normal speech,   Psych:   Good insight. Not anxious nor agitated  Skin:  No rashes. No jaundice    Reviewed most current lab test results and cultures  YES  Reviewed most current radiology test results   YES  Review and summation of old records today    NO  Reviewed patient's current orders and MAR    YES  PMH/SH reviewed - no change compared to H&P  ________________________________________________________________________  Care Plan discussed with:    Comments   Patient     Family      RN     Care Manager     Consultant                        Multidiciplinary team rounds were held today with , nursing, pharmacist and clinical coordinator. Patient's plan of care was discussed; medications were reviewed and discharge planning was addressed. ________________________________________________________________________  Total NON critical care TIME:  20   Minutes    Total CRITICAL CARE TIME Spent:   Minutes non procedure based      Comments   >50% of visit spent in counseling and coordination of care     ________________________________________________________________________  Adam Dai,      Procedures: see electronic medical records for all procedures/Xrays and details which were not copied into this note but were reviewed prior to creation of Plan. LABS:  I reviewed today's most current labs and imaging studies.   Pertinent labs include:  No results for input(s): WBC, HGB, HCT, PLT, HGBEXT, HCTEXT, PLTEXT in the last 72 hours.   Recent Labs     01/12/20  0346 01/11/20  0209 01/10/20  0352   * 134* 131*   K 4.0 3.8 4.0   CL 97 95* 92*   CO2 34* 34* 34*   * 184* 348*   BUN 9 14 18   CREA 0.72 0.77 1.01   CA 8.4* 8.1* 8.0*       Signed: Patricia Montano, DO

## 2020-01-12 NOTE — PROGRESS NOTES
Bedside and Verbal shift change report given to Aimee Ocampo (oncoming nurse) by Kiki Denver (offgoing nurse). Report included the following information SBAR, Kardex, Intake/Output, MAR and Recent Results.

## 2020-01-12 NOTE — PROGRESS NOTES
Problem: Pressure Injury - Risk of  Goal: *Prevention of pressure injury  Description  Document Medardo Scale and appropriate interventions in the flowsheet. Offload heels  Turn approximately every 2 hours  Venelex ointment   Outcome: Progressing Towards Goal  Note: Pressure Injury Interventions:  Sensory Interventions: Assess changes in LOC    Moisture Interventions: Maintain skin hydration (lotion/cream)    Activity Interventions: Pressure redistribution bed/mattress(bed type), PT/OT evaluation    Mobility Interventions: Pressure redistribution bed/mattress (bed type), PT/OT evaluation    Nutrition Interventions: Document food/fluid/supplement intake    Friction and Shear Interventions: HOB 30 degrees or less                Problem: Patient Education: Go to Patient Education Activity  Goal: Patient/Family Education  Outcome: Progressing Towards Goal     Problem: Falls - Risk of  Goal: *Absence of Falls  Description  Document Claude Fall Risk and appropriate interventions in the flowsheet. Outcome: Progressing Towards Goal  Note: Fall Risk Interventions:  Mobility Interventions: Patient to call before getting OOB    Mentation Interventions: Increase mobility    Medication Interventions: Patient to call before getting OOB    Elimination Interventions: Patient to call for help with toileting needs              Problem: Risk for Spread of Infection  Goal: Prevent transmission of infectious organism to others  Description  Prevent the transmission of infectious organisms to other patients, staff members, and visitors.   Outcome: Progressing Towards Goal

## 2020-01-13 VITALS
OXYGEN SATURATION: 92 % | SYSTOLIC BLOOD PRESSURE: 152 MMHG | DIASTOLIC BLOOD PRESSURE: 79 MMHG | WEIGHT: 231.92 LBS | BODY MASS INDEX: 32.47 KG/M2 | HEART RATE: 60 BPM | RESPIRATION RATE: 16 BRPM | HEIGHT: 71 IN | TEMPERATURE: 98 F

## 2020-01-13 LAB
ANION GAP SERPL CALC-SCNC: 4 MMOL/L (ref 5–15)
BUN SERPL-MCNC: 11 MG/DL (ref 6–20)
BUN/CREAT SERPL: 12 (ref 12–20)
CALCIUM SERPL-MCNC: 8.8 MG/DL (ref 8.5–10.1)
CHLORIDE SERPL-SCNC: 93 MMOL/L (ref 97–108)
CO2 SERPL-SCNC: 36 MMOL/L (ref 21–32)
CREAT SERPL-MCNC: 0.92 MG/DL (ref 0.7–1.3)
GLUCOSE BLD STRIP.AUTO-MCNC: 106 MG/DL (ref 65–100)
GLUCOSE BLD STRIP.AUTO-MCNC: 230 MG/DL (ref 65–100)
GLUCOSE SERPL-MCNC: 115 MG/DL (ref 65–100)
POTASSIUM SERPL-SCNC: 4 MMOL/L (ref 3.5–5.1)
SERVICE CMNT-IMP: ABNORMAL
SERVICE CMNT-IMP: ABNORMAL
SODIUM SERPL-SCNC: 133 MMOL/L (ref 136–145)

## 2020-01-13 PROCEDURE — 74011636637 HC RX REV CODE- 636/637: Performed by: INTERNAL MEDICINE

## 2020-01-13 PROCEDURE — 80048 BASIC METABOLIC PNL TOTAL CA: CPT

## 2020-01-13 PROCEDURE — 74011250636 HC RX REV CODE- 250/636: Performed by: INTERNAL MEDICINE

## 2020-01-13 PROCEDURE — 74011250637 HC RX REV CODE- 250/637: Performed by: INTERNAL MEDICINE

## 2020-01-13 PROCEDURE — 97530 THERAPEUTIC ACTIVITIES: CPT

## 2020-01-13 PROCEDURE — 97116 GAIT TRAINING THERAPY: CPT

## 2020-01-13 PROCEDURE — 74011250637 HC RX REV CODE- 250/637: Performed by: HOSPITALIST

## 2020-01-13 PROCEDURE — 36415 COLL VENOUS BLD VENIPUNCTURE: CPT

## 2020-01-13 PROCEDURE — 82962 GLUCOSE BLOOD TEST: CPT

## 2020-01-13 RX ORDER — LANOLIN ALCOHOL/MO/W.PET/CERES
325 CREAM (GRAM) TOPICAL
Qty: 30 TAB | Refills: 1 | Status: SHIPPED | OUTPATIENT
Start: 2020-01-13

## 2020-01-13 RX ORDER — TRAZODONE HYDROCHLORIDE 50 MG/1
50 TABLET ORAL
Qty: 30 TAB | Refills: 1 | Status: SHIPPED | OUTPATIENT
Start: 2020-01-13

## 2020-01-13 RX ORDER — BALSAM PERU/CASTOR OIL
OINTMENT (GRAM) TOPICAL EVERY 8 HOURS
Qty: 1 TUBE | Refills: 1 | Status: SHIPPED | OUTPATIENT
Start: 2020-01-13

## 2020-01-13 RX ORDER — UREA 10 %
100 LOTION (ML) TOPICAL DAILY
Qty: 30 TAB | Refills: 1 | Status: SHIPPED | OUTPATIENT
Start: 2020-01-13

## 2020-01-13 RX ORDER — DOXYCYCLINE HYCLATE 100 MG
100 TABLET ORAL EVERY 12 HOURS
Qty: 14 TAB | Refills: 0 | Status: SHIPPED | OUTPATIENT
Start: 2020-01-13

## 2020-01-13 RX ORDER — INSULIN GLARGINE 100 [IU]/ML
35 INJECTION, SOLUTION SUBCUTANEOUS DAILY
Qty: 1 VIAL | Refills: 1 | Status: SHIPPED | OUTPATIENT
Start: 2020-01-13

## 2020-01-13 RX ADMIN — LISINOPRIL 20 MG: 10 TABLET ORAL at 10:04

## 2020-01-13 RX ADMIN — MAGNESIUM OXIDE TAB 400 MG (241.3 MG ELEMENTAL MG) 400 MG: 400 (241.3 MG) TAB at 10:04

## 2020-01-13 RX ADMIN — HEPARIN SODIUM 5000 UNITS: 5000 INJECTION INTRAVENOUS; SUBCUTANEOUS at 07:15

## 2020-01-13 RX ADMIN — METOPROLOL SUCCINATE 100 MG: 50 TABLET, EXTENDED RELEASE ORAL at 10:04

## 2020-01-13 RX ADMIN — FERROUS SULFATE TAB 325 MG (65 MG ELEMENTAL FE) 325 MG: 325 (65 FE) TAB at 10:04

## 2020-01-13 RX ADMIN — DOXYCYCLINE HYCLATE 100 MG: 100 TABLET, COATED ORAL at 10:06

## 2020-01-13 RX ADMIN — Medication 10 ML: at 07:15

## 2020-01-13 RX ADMIN — Medication 1 CAPSULE: at 10:04

## 2020-01-13 RX ADMIN — CASTOR OIL AND BALSAM, PERU: 788; 87 OINTMENT TOPICAL at 07:16

## 2020-01-13 RX ADMIN — VITAM B12 100 MCG: 100 TAB at 10:04

## 2020-01-13 RX ADMIN — INSULIN LISPRO 3 UNITS: 100 INJECTION, SOLUTION INTRAVENOUS; SUBCUTANEOUS at 11:46

## 2020-01-13 RX ADMIN — DOCUSATE SODIUM 100 MG: 100 CAPSULE, LIQUID FILLED ORAL at 10:04

## 2020-01-13 NOTE — PROGRESS NOTES
[address] Screening   Previous Assessment Search Results Assessment Tracking Number: [id number] Status: Successfully Processed    Assessment Date: 12/11/2019    [facility] Information:   Screener's Name: Federico Zimmerman  NPI: [id number] Provider Name: Marylin Green [facility] [address] Information:  Screener's Name:   NPI: Provider Name:     [facility] Information:  Screener's Name:   NPI: Provider Name:

## 2020-01-13 NOTE — PROGRESS NOTES
The patient will be taken to [facility].  Report was given to [facility]  Patient will be taken via [facility]  Scri[pts, MAR, Kardex, and SBAR included  Patient has his belongings with him

## 2020-01-13 NOTE — PROGRESS NOTES
JONELLE: [facility] SNF can accept patient, [facility] transport set for 12 PM. UAI was completed in December. CM faxed copy of [facility]. CM spoke with [place] #[phone] at [facility]. Confirmed they can accept patient today. Transition of Care Plan to SNF/Rehab    SNF/Rehab Transition:  Patient has been accepted to [facility] and meets criteria for admission. Patient will transported by [facility] and expected to leave at 12 PM    Communication to Patient/Family:  Met with patient and spoke with brother Junior Escobar [phone]  and they are agreeable to the transition plan. Communication to SNF/Rehab:  Bedside RN, Elsa Perdue, has been notified to update the transition plan to the facility and call report (phone number #[phone]). Discharge information has been updated on the AVS.     Discharge instructions fax'd to facility at [facility] # [phone]). SNF/Rehab Transition:  Patient to follow-up with Home Health:  [facility], other  ,none)  PCP/Specialist:   Ambulatory Care Management:     Reviewed and confirmed with facility, Mark Phipps they can manage the patient care needs for the following:     Alexandra Thompson with (X) only those applicable:    Medication:  [x]  Medications will be available at the facility  []  IV Antibiotics   [x]  Controlled Substance - hard copy to be sent with patient   [x]  Weekly Labs   Documents:  [x] Hard RX  [x] MAR  [x] Kardex  [x] AVS  [x]Transfer Summary  [x]Discharge   Equipment:  []  CPAP/BiPAP  []  Wound Vacuum  []  Gann or Urinary Device  []  PICC/Central Line  []  Nebulizer  []  Ventilator   Treatment:  [x]Isolation (for MRSA, VRE, etc.)  []Surgical Drain Management  []Tracheostomy Care  []Dressing Changes  []Dialysis with transportation and chair time   []PEG Care  []Oxygen  []Daily Weights for Heart Failure   Dietary:  []Any diet limitations  []Tube Feedings   []Total Parenteral Management (TPN)   Eligible for Medicaid Long Term Services and Supports  Yes:  [] Eligible for medical assistance or will become eligible within 180 days and UAI completed. [] Provider/Patient and/or support system has requested screening. [x] UAI copy provided to patient or responsible party,  [] UAI unavailable at discharge will send once processed to SNF provider. [] UAI unavailable at discharged mailed to patient  No:   [] Private pay and is not financially eligible for Medicaid within the next 180 days. [] Reside out-of-state. [] A residents of a state owned/operated facility that is licensed  by [facility] and Developmental Services or [facility]  [] Enrollment in [facility] hospice services  []  Medical [address]  [] Patient /Family declines to have screening completed or provide financial information for screening     Financial Resources:  Medicaid    [] Initiated and application pending   [] Full coverage     Advanced Care Plan:  []Surrogate Decision Maker of Care  []POA  []Communicated Code Status (DDNR\", \"Full\")    Other    IM letter discussed with brother via phone.        Chris Rivas, BSW/CRM

## 2020-01-13 NOTE — PROGRESS NOTES
Hospitalist Progress Note    NAME: Leyda Guerra   :  1950   MRN:  [id number]       Assessment / Plan:  1.  Left diabetic foot infection:  -  switched to po doxycycline. Elevation. D/w brother on phone yesterday. -   ct completed with sq edema.  Awaiting snf ( can't go back to group home)     2.  Bilateral lower extremity venous stasis:    - wound care consulted.     3.  Uncontrolled Type 2 diabetes with hyperglycemia:    - controlled cont lantus 40 units daily. Add ac meal lispro for hi sugars during day.     4.  Morbid obesity:  Dietary consult will be requested for dietary counseling.     5.  Hypertension:  We will continue with preadmission medication.     6.  Hyponatremia:  This is most likely due to volume depletion.  improved     7.  Anemia iron def    Start iron supplement     8.  Hearing impairment - pt is very upset in the hospital and wants to leave. Adrianna Dubose at this time how long pt is willing to stay for treatment of cellulitis.  D/w borther yesterday. Harlan Acharya is out of town and won't be able to come in.     Code status: Full  DVT [address] discussed with: Patient/Family and Nurse  Disposition: TBD   Cellulitis due to Diabetes                                  30.0 - 39.9 Obese / Body mass index is 32.36 kg/m². Subjective:     Chief Complaint / Reason for Physician Visit  \"wants to go home\". Discussed with RN events overnight. Review of Systems:  Symptom Y/N Comments  Symptom Y/N Comments   Fever/Chills    Chest Pain     Poor Appetite    Edema     Cough    Abdominal Pain     Sputum    Joint Pain     SOB/LOBATO    Pruritis/Rash     Nausea/vomit    Tolerating PT/OT     Diarrhea    Tolerating Diet     Constipation    Other       Could NOT obtain due to:      Objective:     VITALS:   Last 24hrs VS reviewed since prior progress note.  Most recent are:  Patient Vitals for the past 24 hrs:   Temp Pulse Resp BP SpO2   20 0150 (P) 97.9 °F (36.6 °C) (P) 61 (P) 16 (P) 135/77 (P) 93 %   01/12/20 2120 98.6 °F (37 °C) 80 16 147/72 94 %   01/12/20 1618 98 °F (36.7 °C) 64 16 172/66 92 %   01/12/20 0914 98 °F (36.7 °C) 74 16 156/65 91 %     No intake or output data in the 24 hours ending 01/13/20 0853     PHYSICAL EXAM:  General: WD, WN. Alert, cooperative, no acute distress    EENT:  EOMI. Anicteric sclerae. MMM  Resp:  CTA bilaterally, no wheezing or rales. No accessory muscle use  CV:  Regular  rhythm,  No edema  GI:  Soft, Non distended, Non tender.  +Bowel sounds  Neurologic:  Alert and oriented X 3, normal speech,   Psych:   Good insight. Not anxious nor agitated  Skin:  No rashes. No jaundice    Reviewed most current lab test results and cultures  YES  Reviewed most current radiology test results   YES  Review and summation of old records today    NO  Reviewed patient's current orders and MAR    YES  PMH/SH reviewed - no change compared to H&P  ________________________________________________________________________  Care Plan discussed with:    Comments   Patient     Family      RN     Care Manager     Consultant                        Multidiciplinary team rounds were held today with , nursing, pharmacist and clinical coordinator. Patient's plan of care was discussed; medications were reviewed and discharge planning was addressed. ________________________________________________________________________  Total NON critical care TIME:  20   Minutes    Total CRITICAL CARE TIME Spent:   Minutes non procedure based      Comments   >50% of visit spent in counseling and coordination of care     ________________________________________________________________________  Sanchez Berry, DO     Procedures: see electronic medical records for all procedures/Xrays and details which were not copied into this note but were reviewed prior to creation of Plan. LABS:  I reviewed today's most current labs and imaging studies.   Pertinent labs include:  No results for input(s): WBC, HGB, HCT, PLT, HGBEXT, HCTEXT, PLTEXT in the last 72 hours.   Recent Labs     01/13/20  0203 01/12/20  0346 01/11/20  0209   * 134* 134*   K 4.0 4.0 3.8   CL 93* 97 95*   CO2 36* 34* 34*   * 182* 184*   BUN 11 9 14   CREA 0.92 0.72 0.77   CA 8.8 8.4* 8.1*       Signed: Cesia Butler, DO

## 2020-01-13 NOTE — DISCHARGE SUMMARY
Hospitalist Discharge Summary     Patient ID:  Elvin Menezes  [id number]  71 y.o.  1950  1/6/2020    PCP on record: Unknown, Provider    Admit date: 1/6/2020  Discharge date and time: 1/13/2020    DISCHARGE DIAGNOSIS:    1.  Left diabetic foot infection:       2.  Bilateral lower extremity venous stasis:         3.  Uncontrolled Type 2 diabetes with hyperglycemia:         4.  Morbid obesity     5.  Hypertension     6.  Hyponatremia     7.  Anemia iron def - rec outpt colonoscopy     8.  Hearing impairment  CONSULTATIONS:  IP CONSULT TO PODIATRY  IP CONSULT TO INFECTIOUS DISEASES    Excerpted HPI from H&P of Antonio Paul MD:  This is a 63-year-old man with past medical history significant for bilateral lower extremity venous stasis, type 2 diabetes, hypertension, and hearing impairment, who was in his usual state of health until a couple of days ago when the patient developed worsening of his leg wounds. The patient has bilateral lower extremity venous stasis with superimposed wounds. He was recently admitted to this hospital from 12/04/2019 to 12/11/2019. The patient was admitted and treated for cellulitis of lower extremity. During that hospitalization, ultrasound of the lower extremity was obtained. This was negative for DVT. Echocardiogram was also obtained that shows well-preserved ejection fraction. The patient received antibiotics and was discharged back to the assisted living facility where the patient resides. The patient was sent to the emergency room today from the assisted living facility because according to report, they can no longer take care of the patient's wounds. The patient denies fever, rigors, chills. The patient has significant hearing impairment. The patient is also concerned that they are dressing the wounds too frequently at the assisted living facility. He is of the opinion that the wounds should not be covered. The patient denies any associated pain. No fever, no rigors, no chills. When the patient arrived at the emergency room, he was started on antibiotics and was referred to the hospitalist service for evaluation for admission.       ______________________________________________________________________  DISCHARGE SUMMARY/HOSPITAL COURSE:  for full details see H&P, daily progress notes, labs, consult notes. The pt was evaluated by podiatry and infectious diseas. Wound culture was positive for mrsa and ct leg was negative for any fluid collection. The pt is planned for dischare with 7 days o doxycycline. The pt will require op colonoscopy for iron deficiency anemia. His op records are not available  [facility]      _______________________________________________________________________  Patient seen and examined by me on discharge day. Pertinent Findings:  Gen:    Not in distress  Chest: Clear lungs  CVS:   Regular rhythm. No edema  Abd:  Soft, not distended, not tender  Neuro:  Alert, nad  _______________________________________________________________________  DISCHARGE MEDICATIONS:   Current Discharge Medication List      START taking these medications    Details   insulin glargine (LANTUS) 100 unit/mL injection 35 Units by SubCUTAneous route daily. Qty: 1 Vial, Refills: 1      balsam peru-castor oil (VENELEX) ointment Apply  to affected area every eight (8) hours. Qty: 1 Tube, Refills: 1      cyanocobalamin (VITAMIN B12) 100 mcg tablet Take 1 Tab by mouth daily. Qty: 30 Tab, Refills: 1      doxycycline (VIBRA-TABS) 100 mg tablet Take 1 Tab by mouth every twelve (12) hours. Qty: 14 Tab, Refills: 0      ferrous sulfate 325 mg (65 mg iron) tablet Take 1 Tab by mouth daily (with breakfast). Qty: 30 Tab, Refills: 1      L. acidoph & paracasei- S therm- Bifido (VITOR-Q/RISAQUAD) 8 billion cell cap cap Take 1 Cap by mouth daily.   Qty: 30 Cap, Refills: 1      traZODone (DESYREL) 50 mg tablet Take 1 Tab by mouth nightly as needed for Sleep. Qty: 30 Tab, Refills: 1         CONTINUE these medications which have NOT CHANGED    Details   bumetanide (BUMEX) 1 mg tablet Take 1 Tab by mouth daily. Qty: 30 Tab, Refills: 0      docusate sodium (COLACE) 100 mg capsule Take 100 mg by mouth daily. lisinopril (PRINIVIL, ZESTRIL) 20 mg tablet Take 20 mg by mouth daily. magnesium oxide (MAG-OX) 400 mg tablet Take 400 mg by mouth daily. melatonin 5 mg cap capsule Take 5 mg by mouth nightly. metFORMIN (GLUCOPHAGE) 1,000 mg tablet Take 1,000 mg by mouth two (2) times daily (with meals). metoprolol succinate (TOPROL-XL) 100 mg tablet Take 100 mg by mouth daily. insulin aspart U-100 (NOVOLOG U-100 INSULIN ASPART) 100 unit/mL injection by SubCUTAneous route. Sliding scale:  -250 = 4 units  -300 = 6 units  -350 = 8 units  BS > 351 = 10 units      sertraline (ZOLOFT) 25 mg tablet Take 25 mg by mouth nightly. STOP taking these medications       insulin glargine (LANTUS,BASAGLAR) 100 unit/mL (3 mL) inpn Comments:   Reason for Stopping:                 Patient Follow Up Instructions: Activity: Activity as tolerated  Diet: Diabetic Diet  Wound Care: cleans with normal saline, wipe wound bed clean and dry and apply a piece of xeroform gauze that has been folded in hald and cover with 4x4's secure with aroll gauze and tape  Heels and sacrum apply venelex every 8 hrs. Follow-up with pcp  in 1 week.  Gi for colonosocpy  Follow-up tests/labs none  Follow-up Information     Follow up With Specialties Details Why Contact Info    [facility]  In 1 week irone deficiency [address] [facility]  [address] [place] [address]  [phone]    Unknown, Provider    Patient not available to ask          ________________________________________________________________    Risk of deterioration: High    Condition at Discharge: Stable  __________________________________________________________________    Disposition  SNF/LTC    ____________________________________________________________________    Code Status: Full Code  ___________________________________________________________________      Total time in minutes spent coordinating this discharge (includes going over instructions, follow-up, prescriptions, and preparing report for sign off to her PCP) :  31 minutes    Signed:  Sanchez Berry DO

## 2020-01-13 NOTE — PROGRESS NOTES
Problem: Mobility Impaired (Adult and Pediatric)  Goal: *Acute Goals and Plan of Care (Insert Text)  Description  FUNCTIONAL STATUS PRIOR TO ADMISSION: Patient was modified independent using a rolling walker and wheelchair for functional mobility. HOME SUPPORT PRIOR TO ADMISSION: The patient lived at a group home, [facility], prior to admission. Physical Therapy Goals  Initiated 1/10/2020  1. Patient will move from supine to sit and sit to supine  and roll side to side in bed with supervision/set-up within 7 day(s). 2.  Patient will transfer from bed to chair and chair to bed with supervision/set-up using the least restrictive device within 7 day(s). 3.  Patient will perform sit to stand with supervision/set-up within 7 day(s). 4.  Patient will ambulate with supervision/set-up for 50 feet with the least restrictive device within 7 day(s). Outcome: Progressing Towards Goal   PHYSICAL THERAPY TREATMENT  Patient: Elvin Menezes (25 y.o. male)  Date: 1/13/2020  Diagnosis: [facility] foot [facility] [E11.628, L08.9]   Venous insufficiency of both lower extremities       Precautions: Contact, Fall(extremely [place])  Chart, physical therapy assessment, plan of care and goals were reviewed. ASSESSMENT  Patient continues with skilled PT services and is progressing towards goals. He needs gentle encouragement to mobilize and complete AM self care, but otherwise is able to move short distances with the walker with CGA to min assist.  He would benefit from continued gait and endurance training to enable more self sufficiency and eventual return to his adult home. Current Level of Function Impacting Discharge (mobility/balance): CGA to min assist for short distance ambulation    Other factors to consider for discharge: very [place]         PLAN :  Patient continues to benefit from skilled intervention to address the above impairments. Continue treatment per established plan of care.   to address goals. Recommendation for discharge: (in order for the patient to meet his/her long term goals)  Therapy up to 5 days/week in SNF setting    This discharge recommendation:  Has been made in collaboration with the attending provider and/or case management    IF patient discharges home will need the following DME: to be determined (TBD)       SUBJECTIVE:   Patient stated I like this chair.     OBJECTIVE DATA SUMMARY:   Critical Behavior:  Neurologic State: Alert  Orientation Level: Oriented to person, Oriented to place, Disoriented to time, Disoriented to situation  Cognition: Follows commands  Safety/Judgement: Good awareness of safety precautions  Functional Mobility Training:  Bed Mobility:     Supine to Sit: Supervision; Additional time  Sit to Supine: (up in chair after)           Transfers:  Sit to Stand: Stand-by assistance; Additional time; Adaptive equipment  Stand to Sit: Adaptive equipment; Additional time;Contact guard assistance        Bed to Chair: Contact guard assistance; Adaptive equipment; Additional time                    Balance:  Sitting: Intact; Without support  Standing: Impaired; With support  Standing - Static: Good;Occasional  Standing - Dynamic : Fair;Occasional  Ambulation/Gait Training:  Distance (ft): 20 Feet (ft)(twice with seated break between)  Assistive Device: Gait belt;Walker, rolling  Ambulation - Level of Assistance: Contact guard assistance; Additional time; Adaptive equipment        Gait Abnormalities: Decreased step clearance;Trunk sway increased        Base of Support: Widened     Speed/Caitlin: Pace decreased (<100 feet/min)  Step Length: Right shortened;Left shortened                    Stairs:               Therapeutic Exercises:   Standing balance with walker for bathing  Pain Rating:  Denies any pain with activity    Activity Tolerance:   Fair and he does fatigue, and note some shakiness of LEs and UEs towards the end of his walk   Please refer to the flowsheet for vital signs taken during this treatment.     After treatment patient left in no apparent distress:   Sitting in chair, Call bell within reach, and LEs elevated in recliner chair     COMMUNICATION/COLLABORATION:   The patients plan of care was discussed with: Registered Nurse and     Cass Valencia, PT   Time Calculation: 28 mins

## 2020-03-02 ENCOUNTER — ANESTHESIA (OUTPATIENT)
Dept: ENDOSCOPY | Age: 70
End: 2020-03-02
Payer: MEDICARE

## 2020-03-02 ENCOUNTER — ANESTHESIA EVENT (OUTPATIENT)
Dept: ENDOSCOPY | Age: 70
End: 2020-03-02
Payer: MEDICARE

## 2020-03-02 ENCOUNTER — HOSPITAL ENCOUNTER (OUTPATIENT)
Age: 70
Setting detail: OUTPATIENT SURGERY
Discharge: HOME | End: 2020-03-02
Attending: INTERNAL MEDICINE | Admitting: INTERNAL MEDICINE
Payer: MEDICARE

## 2020-03-02 VITALS
SYSTOLIC BLOOD PRESSURE: 159 MMHG | WEIGHT: 231 LBS | DIASTOLIC BLOOD PRESSURE: 61 MMHG | TEMPERATURE: 98 F | HEART RATE: 75 BPM | OXYGEN SATURATION: 94 % | RESPIRATION RATE: 20 BRPM | BODY MASS INDEX: 32.23 KG/M2

## 2020-03-02 LAB
GLUCOSE BLD STRIP.AUTO-MCNC: 96 MG/DL (ref 65–100)
SERVICE CMNT-IMP: NORMAL

## 2020-03-02 PROCEDURE — 82962 GLUCOSE BLOOD TEST: CPT

## 2020-03-02 PROCEDURE — 88305 TISSUE EXAM BY PATHOLOGIST: CPT

## 2020-03-02 PROCEDURE — 74011250636 HC RX REV CODE- 250/636: Performed by: NURSE ANESTHETIST, CERTIFIED REGISTERED

## 2020-03-02 PROCEDURE — 76040000019: Performed by: INTERNAL MEDICINE

## 2020-03-02 PROCEDURE — 77030013992 HC SNR POLYP ENDOSC BSC -B: Performed by: INTERNAL MEDICINE

## 2020-03-02 PROCEDURE — 74011000258 HC RX REV CODE- 258: Performed by: NURSE ANESTHETIST, CERTIFIED REGISTERED

## 2020-03-02 PROCEDURE — 76060000031 HC ANESTHESIA FIRST 0.5 HR: Performed by: INTERNAL MEDICINE

## 2020-03-02 PROCEDURE — 74011000250 HC RX REV CODE- 250: Performed by: NURSE ANESTHETIST, CERTIFIED REGISTERED

## 2020-03-02 PROCEDURE — 77030021593 HC FCPS BIOP ENDOSC BSC -A: Performed by: INTERNAL MEDICINE

## 2020-03-02 RX ORDER — PROPOFOL 10 MG/ML
INJECTION, EMULSION INTRAVENOUS AS NEEDED
Status: DISCONTINUED | OUTPATIENT
Start: 2020-03-02 | End: 2020-03-02 | Stop reason: HOSPADM

## 2020-03-02 RX ORDER — FLUMAZENIL 0.1 MG/ML
0.2 INJECTION INTRAVENOUS
Status: DISCONTINUED | OUTPATIENT
Start: 2020-03-02 | End: 2020-03-02 | Stop reason: HOSPADM

## 2020-03-02 RX ORDER — MIDAZOLAM HYDROCHLORIDE 1 MG/ML
.25-1 INJECTION, SOLUTION INTRAMUSCULAR; INTRAVENOUS
Status: DISCONTINUED | OUTPATIENT
Start: 2020-03-02 | End: 2020-03-02 | Stop reason: HOSPADM

## 2020-03-02 RX ORDER — SODIUM CHLORIDE 0.9 % (FLUSH) 0.9 %
5-40 SYRINGE (ML) INJECTION EVERY 8 HOURS
Status: DISCONTINUED | OUTPATIENT
Start: 2020-03-02 | End: 2020-03-02 | Stop reason: HOSPADM

## 2020-03-02 RX ORDER — NALOXONE HYDROCHLORIDE 0.4 MG/ML
0.4 INJECTION, SOLUTION INTRAMUSCULAR; INTRAVENOUS; SUBCUTANEOUS
Status: DISCONTINUED | OUTPATIENT
Start: 2020-03-02 | End: 2020-03-02 | Stop reason: HOSPADM

## 2020-03-02 RX ORDER — SODIUM CHLORIDE 9 MG/ML
50 INJECTION, SOLUTION INTRAVENOUS CONTINUOUS
Status: DISCONTINUED | OUTPATIENT
Start: 2020-03-02 | End: 2020-03-02 | Stop reason: HOSPADM

## 2020-03-02 RX ORDER — SODIUM CHLORIDE 9 MG/ML
INJECTION, SOLUTION INTRAVENOUS
Status: DISCONTINUED | OUTPATIENT
Start: 2020-03-02 | End: 2020-03-02 | Stop reason: HOSPADM

## 2020-03-02 RX ORDER — DEXTROMETHORPHAN/PSEUDOEPHED 2.5-7.5/.8
1.2 DROPS ORAL
Status: DISCONTINUED | OUTPATIENT
Start: 2020-03-02 | End: 2020-03-02 | Stop reason: HOSPADM

## 2020-03-02 RX ORDER — EPINEPHRINE 0.1 MG/ML
1 INJECTION INTRACARDIAC; INTRAVENOUS
Status: DISCONTINUED | OUTPATIENT
Start: 2020-03-02 | End: 2020-03-02 | Stop reason: HOSPADM

## 2020-03-02 RX ORDER — SODIUM CHLORIDE 0.9 % (FLUSH) 0.9 %
5-40 SYRINGE (ML) INJECTION AS NEEDED
Status: DISCONTINUED | OUTPATIENT
Start: 2020-03-02 | End: 2020-03-02 | Stop reason: HOSPADM

## 2020-03-02 RX ORDER — OMEPRAZOLE 40 MG/1
40 CAPSULE, DELAYED RELEASE ORAL DAILY
Qty: 90 CAP | Refills: 3 | Status: SHIPPED | OUTPATIENT
Start: 2020-03-02 | End: 2021-02-25

## 2020-03-02 RX ORDER — FENTANYL CITRATE 50 UG/ML
100 INJECTION, SOLUTION INTRAMUSCULAR; INTRAVENOUS
Status: DISCONTINUED | OUTPATIENT
Start: 2020-03-02 | End: 2020-03-02 | Stop reason: HOSPADM

## 2020-03-02 RX ORDER — LIDOCAINE HYDROCHLORIDE 20 MG/ML
INJECTION, SOLUTION EPIDURAL; INFILTRATION; INTRACAUDAL; PERINEURAL AS NEEDED
Status: DISCONTINUED | OUTPATIENT
Start: 2020-03-02 | End: 2020-03-02 | Stop reason: HOSPADM

## 2020-03-02 RX ORDER — ATROPINE SULFATE 0.1 MG/ML
0.5 INJECTION INTRAVENOUS
Status: DISCONTINUED | OUTPATIENT
Start: 2020-03-02 | End: 2020-03-02 | Stop reason: HOSPADM

## 2020-03-02 RX ADMIN — PHENYLEPHRINE HYDROCHLORIDE 80 MCG: 10 INJECTION INTRAVENOUS at 14:43

## 2020-03-02 RX ADMIN — LIDOCAINE HYDROCHLORIDE 60 MG: 20 INJECTION, SOLUTION EPIDURAL; INFILTRATION; INTRACAUDAL; PERINEURAL at 14:29

## 2020-03-02 RX ADMIN — PHENYLEPHRINE HYDROCHLORIDE 80 MCG: 10 INJECTION INTRAVENOUS at 14:38

## 2020-03-02 RX ADMIN — PHENYLEPHRINE HYDROCHLORIDE 80 MCG: 10 INJECTION INTRAVENOUS at 14:35

## 2020-03-02 RX ADMIN — PROPOFOL 200 MG: 10 INJECTION, EMULSION INTRAVENOUS at 14:29

## 2020-03-02 RX ADMIN — PROPOFOL 150 MG: 10 INJECTION, EMULSION INTRAVENOUS at 14:42

## 2020-03-02 RX ADMIN — SODIUM CHLORIDE: 900 INJECTION, SOLUTION INTRAVENOUS at 14:12

## 2020-03-02 NOTE — ROUTINE PROCESS
Leandrew Parent 1950 
[id number] Situation: 
Verbal report received from: Crissy Bernard RN Procedure: Procedure(s): 
COLONOSCOPY,EGD 
ESOPHAGOGASTRODUODENOSCOPY (EGD) ESOPHAGOGASTRODUODENAL (EGD) BIOPSY ENDOSCOPIC POLYPECTOMY Background: 
 
Preoperative diagnosis: TYPE 2 DIABETES MELLITUS 
LONG TEM CURRENT USE O FNON-STEROIDAL ANTI-INFLAMMATORIES HEARING LOSS ANEMIA, UNSPECIFIED Postoperative diagnosis: Gastritis, mild diverticulosis, colon polyp ( not retrieved) :  Dr. Alva Police Assistant(s): Endoscopy Technician-1: Cathleen Olivarez Endoscopy RN-1: Sanjiv Roque RN Specimens:  
ID Type Source Tests Collected by Time Destination 1 : duodenum bx Preservative   Rene Lee MD 3/2/2020 1426 Pathology 2 : gastric bx Preservative   Rene Lee MD 3/2/2020 1427 Pathology H. Pylori  no Assessment: 
Intra-procedure medications Anesthesia gave intra-procedure sedation and medications, see anesthesia flow sheet yes Intravenous fluids: NS@ Thom File Vital signs stable Abdominal assessment: round and soft Recommendation: 
Discharge patient per MD order. Family or Friend Permission to share finding with family or friend yes

## 2020-03-02 NOTE — H&P
[facility].  [address] [facility] [address], [address]  [phone]                                History and Physical     NAME: Shannon Espinosa   :  1950   MRN:  [id number]     HPI:  The patient was seen and examined. No past surgical history on file. No past medical history on file. Social History     Tobacco Use    Smoking status: Not on file   Substance Use Topics    Alcohol use: Not on file    Drug use: Not on file     No Known Allergies  No family history on file. No current facility-administered medications for this encounter. PHYSICAL EXAM:  General: WD, WN. Alert, cooperative, no acute distress    HEENT: NC, Atraumatic. PERRLA, EOMI. Anicteric sclerae. Lungs:  CTA Bilaterally. No Wheezing/Rhonchi/Rales. Heart:  Regular  rhythm,  No murmur, No Rubs, No Gallops  Abdomen: Soft, Non distended, Non tender.  +Bowel sounds, no HSM  Extremities: No c/c/e  Neurologic:  CN 2-12 gi, Alert and oriented X 3. No acute neurological distress   Psych:   Good insight. Not anxious nor agitated. The heart, lungs and mental status were satisfactory for the administration of MAC sedation and for the procedure.       Mallampati score: 2       Assessment:   · anemia    Plan:   · Endoscopic procedure :egd/colon  · MAC sedation

## 2020-03-02 NOTE — PROCEDURES
118 [facility].  [address] [place] [address] [address], [address]  [phone]                           Colonoscopy and EGD Procedure Note      Indications:  Anemia     :  Froy Tatum MD    Referring Provider: Brittanie Ramirez MD    Sedation:  MAC anesthesia    Procedure Details:  After informed consent was obtained with all risks and benefits of procedure explained and pre-operative exam completed, pt was placed in the left lateral decubitus position. Following sequential administration of sedation as per above, the gastroscope was inserted into the mouth and advanced under direct vision to second portion of the duodenum. A careful inspection was made as the gastroscope was withdrawn, including a retroflexed view of the proximal stomach; findings and interventions are described below. EGD Findings:  Esophagus:normal  Stomach:antral predominant stripy erosive gastritis, heme present. Mucosa biopsied  Duodenum/jejunum:normal, biopsied    EGD Interventions:  biopsies    The bed was then turned and upon sequential sedation as per above, a digital rectal exam was performed per below. The Olympus videocolonoscope was inserted in the rectum and carefully advanced to the terminal ileum. The quality of preparation was unsatisfactory. Joaquin Bowel Prep Score 1/1/1 . The colonoscope was slowly withdrawn with careful evaluation between folds. Retroflexion in the rectum was performed. Colon Findings:   Rectum: formed stool  Sigmoid: semi-formed stool  Descending Colon:semi-formed stool  Transverse Colon: semi-formed stool  Ascending Colon: semi-formed stool, one 10 mm sessile polyp, required small amount of heat to remove with snare.  Could not retrieve polyp 2/2 large amount of stool  Cecum: semi-formed stool  Terminal Ileum: normal    Colonoscopy Interventions: polypectomy           Specimens Removed:    ID Type Source Tests Collected by Time Destination   1 : duodenum bx Preservative Citlaly De León MD 3/2/2020 1426 Pathology   2 : gastric bx Preservative   Citlaly De León MD 3/2/2020 1427 Pathology       Complications: None. EBL:  Minimal    Impression:    See Postoperative diagnosis above    Recommendations:   - Resume normal medications. - Colon prep today not adequate for screening, though adequate to rule out large colonic mass. - Recommend starting omeprazole 40 mg once daily.   - Recommend oral iron and trend CBC. If patient does not improve and it is deemed he needs repeat colonoscopy, recommend double trilyte prep. Discharge Disposition:  Home in the company of a  when able to ambulate.     Maxine Herr MD  3/2/2020  2:53 PM

## 2020-03-02 NOTE — PERIOP NOTES
Patient has been evaluated by anesthesia pre-procedure. Patient alert. Pt's brother Pilar Mccallum assisted with the assessment. Vital signs will not be charted by the Endoscopy nurse. All vitals, airway, and loc are monitored by anesthesia staff throughout procedure.        .Endoscope was pre-cleaned at bedside immediately following procedure by AB

## 2020-03-03 NOTE — ANESTHESIA POSTPROCEDURE EVALUATION
Procedure(s):  COLONOSCOPY,EGD  ESOPHAGOGASTRODUODENOSCOPY (EGD)  ESOPHAGOGASTRODUODENAL (EGD) BIOPSY  ENDOSCOPIC POLYPECTOMY. MAC    <BSHSIANPOST>    Vitals Value Taken Time   BP 0/0 3/2/2020  4:14 PM   Temp 36.7 °C (98 °F) 3/2/2020  3:05 PM   Pulse 0 3/2/2020  4:14 PM   Resp 0 3/2/2020  4:14 PM   SpO2 91 % 3/2/2020  3:49 PM   Vitals shown include unvalidated device data.

## 2022-04-01 NOTE — DISCHARGE INSTRUCTIONS
118 [facility].  [address]  [id number]  1950    It was my pleasure seeing you for your procedure. You will also receive a summary report with the findings from this procedure and any further recommendations. If you had polyps removed or biopsies taken during your procedure, you will receive a separate letter from me within the next 2 weeks. If you don't receive this letter or if you have any questions, please call my office [phone]. Please take note of the post procedure instructions listed below. Dr. Cedric Schneider Chick    These instructions give you information on caring for yourself after your procedure. Call your doctor if you have any problems or questions after your procedure. HOME CARE  · Walk if you have belly cramping or gas. Walking will help get rid of the air and reduce the bloated feeling in your belly (abdomen). · Your IV site (where you received drugs) may be tender to touch. Place warm towels on the site; keep your arm up on two pillows if you have any swelling or soreness in the area. · You may shower. ACTIVITY:  · Take frequent rest periods and move at a slower pace for the next 24 hours. .  · You may resume your regular activity tomorrow if you are feeling back to normal.  · Do not drive or ride a bicycle for at least 24 hours (because of the medicine (anesthesia) used during the test). · Do not sign any important legal documents or use or operate any machinery for 24 hours  · Do not take sleeping medicines/nerve drugs for 24 hours unless the doctor tells you. · You can return to work/school tomorrow unless otherwise instructed. NUTRITION:  · Drink plenty of fluids to keep your pee (urine) clear or pale yellow  · Begin with a light meal and progress to your normal diet.  Heavy or fried foods are harder to digest and may make you feel sick to your stomach (nauseated). · Once you are feeling back to normal, you may resume your normal diet as instructed by your doctor. · Avoid alcoholic beverages for 24 hours or as instructed. IF YOU HAD BIOPSIES TAKEN OR POLYPS REMOVED DURING THE PROCEDURE:  · For the next 7 days, avoid all non-steroidal antiinflammatory medications such as Ibuprofen, Motrin, Advil, Alleve, Supriya-seltzer, Goody's powder, BC powder. · If you do not have an heart condition that requires you to take a daily aspirin, you should avoid taking aspirin for 7 days. · Eat a soft diet for 24 hours. · Monitor your stools for any blood or dark black, tar-like, stools as this may be a sign of bleeding and if you see any blood, notify your doctor immediately. GET HELP RIGHT AWAY AND SEEK IMMEDIATE MEDICAL CARE IF:  · You have more than a spotting of blood in your stool. · You pass clumps of tissue (blood clots) or fill the toilet with blood. · Your belly is painfully swollen or puffy (abdominal distention). · You throw up (vomit). · You have a fever. · You have redness, pain or swelling at the IV site that last greater than two days. · You have abdominal pain or discomfort that is severe or gets worse throughout the day. Post-procedure diagnosis:  Gastritis, mild diverticulosis, colon polyp ( not retrieved)    Post-procedure recommendations:  EGD Findings:  Esophagus:normal  Stomach:antral predominant stripy erosive gastritis, heme present. Mucosa biopsied  Duodenum/jejunum:normal, biopsied    Colon Findings:   Rectum: formed stool  Sigmoid: semi-formed stool  Descending Colon:semi-formed stool  Transverse Colon: semi-formed stool  Ascending Colon: semi-formed stool, one 10 mm sessile polyp, required small amount of heat to remove with snare. Could not retrieve polyp 2/2 large amount of stool  Cecum: semi-formed stool  Terminal Ileum: normal    Recommendations:   - Resume normal medications.   - Colon prep today not adequate for screening, though adequate to rule out large colonic mass. - Recommend starting omeprazole 40 mg once daily.   - Recommend oral iron and trend CBC. If patient does not improve and it is deemed he needs repeat colonoscopy, recommend double trilyte prep. [place] PGY-7

## (undated) DEVICE — TUBING HYDR IRR --

## (undated) DEVICE — SNARE ENDOSCP M L240CM W27MM SHTH DIA2.4MM CHN 2.8MM OVL

## (undated) DEVICE — REM POLYHESIVE ADULT PATIENT RETURN ELECTRODE: Brand: VALLEYLAB

## (undated) DEVICE — FORCEPS BX L240CM JAW DIA2.8MM L CAP W/ NDL MIC MESH TOOTH